# Patient Record
Sex: MALE | Race: WHITE | Employment: OTHER | URBAN - METROPOLITAN AREA
[De-identification: names, ages, dates, MRNs, and addresses within clinical notes are randomized per-mention and may not be internally consistent; named-entity substitution may affect disease eponyms.]

---

## 2024-10-08 ENCOUNTER — HOSPITAL ENCOUNTER (EMERGENCY)
Facility: HOSPITAL | Age: 79
Discharge: HOME | End: 2024-10-08
Attending: EMERGENCY MEDICINE

## 2024-10-08 VITALS
OXYGEN SATURATION: 91 % | HEIGHT: 69 IN | WEIGHT: 180 LBS | DIASTOLIC BLOOD PRESSURE: 92 MMHG | SYSTOLIC BLOOD PRESSURE: 166 MMHG | RESPIRATION RATE: 20 BRPM | BODY MASS INDEX: 26.66 KG/M2 | TEMPERATURE: 99 F | HEART RATE: 80 BPM

## 2024-10-08 DIAGNOSIS — R04.0 EPISTAXIS: Primary | ICD-10-CM

## 2024-10-08 PROCEDURE — 99282 EMERGENCY DEPT VISIT SF MDM: CPT

## 2024-10-08 NOTE — ED INITIAL ASSESSMENT (HPI)
Per patient, he has an intermittent left nose bleed since 1:30 AM. Patient is on Elaquis. Patient denies dizziness. Patient denies any trauma.

## 2024-10-09 NOTE — ED PROVIDER NOTES
Patient Seen in: [facility] Emergency Department      History     Chief Complaint   Patient presents with    Nose Bleed     Stated Complaint: nose bleed    Subjective:   HPI    Patient is a 79-year-old male who arrives with intermittent epistaxis from left nare since 1:30 in the morning.  He states it started spontaneously and resolved on its own.  He states it then occurred earlier this morning.  It has not bled since but he wanted to come in to have it checked out today.  He is on Eliquis.  He denies any other bruising or bleeding.  No dizziness.    Objective:     Past Medical History:    A-fib (HCC)    CVA (cerebral vascular accident) (HCC)    Diabetes (HCC)              History reviewed. No pertinent surgical history.             Social History     Socioeconomic History    Marital status:    Tobacco Use    Smoking status: Every Day    Smokeless tobacco: Never   Substance and Sexual Activity    Alcohol use: Yes     Alcohol/week: 6.0 - 7.0 standard drinks of alcohol     Types: 3 - 4 Standard drinks or equivalent, 3 Cans of beer per week     Social Determinants of Health     Food Insecurity: Low Risk  (3/11/2022)    Received from [facility]    Food Insecurity     Have there been times that your food ran out, and you didn't have money to get more?: No     Are there times that you worry that this might happen?: No   Transportation Needs: Low Risk  (3/11/2022)    Received from [facility]    Transportation Needs     Do you have trouble getting transportation to medical appointments?: No   Housing Stability: Low Risk  (3/11/2022)    Received from [facility]    Housing Stability     Are you concerned about having a safe and reliable place to live?: No                  Physical Exam     ED Triage Vitals [10/08/24 1853]   BP (S) (!) 191/101   Pulse 72   Resp 20   Temp 98.5 °F (36.9 °C)   Temp src    SpO2 95 %   O2 Device None (Room air)        Current Vitals:   Vital Signs  BP: (!) 166/91  Pulse: 72  Resp: 20  Temp: 98.5 °F (36.9 °C)    Oxygen Therapy  SpO2: 95 %  O2 Device: None (Room air)        Physical Exam  GENERAL: No acute distress, awake and alert  HEENT: EOMI, PERRL, dried blood medial left nare, no active bleeding  RESP: no tachypnea or retractions  Extremities: FROM of all extremities  Neuro: CN intact, normal speech, 5/5 motor strength in all extremities, no focal deficits    ED Course   Labs Reviewed - No data to display       MDM          Medical Decision Making  Patient declines any further evaluation as bleeding has now resolved.  Advised on management at home if bleeding continues and when to return.    Amount and/or Complexity of Data Reviewed  External Data Reviewed: labs.     Details: Labs 2022 reviewed        Disposition and Plan     Clinical Impression:  1. Epistaxis         Disposition:  Discharge  10/8/2024  8:10 pm    Follow-up:  Wyatt Ross DO  [address]  [address]  [facility] [id number] [phone]    Follow up      Ben Jackson MD  [address]  [facility] [address]  [phone]    Follow up      We recommend that you schedule follow up care with a primary care provider within the next three months to obtain basic health screening including reassessment of your blood pressure.      Medications Prescribed:  Current Discharge Medication List              Supplementary Documentation:

## 2024-10-09 NOTE — ED QUICK NOTES
Pt found in room 18, Pt states he does not have a ride home to the [employer] and does not have money for a taxi. Pt ambulatory with steady gait. This RN to call case management for an uber.

## 2024-10-09 NOTE — ED QUICK NOTES
Pt provided discharge paperwork and vital signs assessed prior to discharge. Pt verbalized understanding of all discharge paperwork with no further questions at this time.  Vital signs assessed prior to DC (See VS flowsheet for details). Pt wheeled via wheelchair to ED WR and into department provided Lyft.

## 2024-10-09 NOTE — DISCHARGE INSTRUCTIONS
\"You had elevated blood pressure today and you need to follow up with your doctor for a repeat blood pressure check and further discussion of lifestyle modifications that include Weight Reduction - Dietary Sodium Restriction - Increased Physical Activity and Moderation in alcohol (ETOH) Consumption. If possible check your pressure at home and keep a blood pressure log to bring to your physician.\"    Hold pressure if continues to bleed  Return for intractable bleeding

## 2024-10-10 ENCOUNTER — HOSPITAL ENCOUNTER (EMERGENCY)
Facility: HOSPITAL | Age: 79
Discharge: HOME | End: 2024-10-10
Attending: EMERGENCY MEDICINE

## 2024-10-10 VITALS
SYSTOLIC BLOOD PRESSURE: 157 MMHG | OXYGEN SATURATION: 97 % | TEMPERATURE: 99 F | RESPIRATION RATE: 18 BRPM | DIASTOLIC BLOOD PRESSURE: 63 MMHG | HEART RATE: 89 BPM

## 2024-10-10 DIAGNOSIS — J34.9: Primary | ICD-10-CM

## 2024-10-10 PROCEDURE — 99282 EMERGENCY DEPT VISIT SF MDM: CPT

## 2024-10-10 PROCEDURE — 99283 EMERGENCY DEPT VISIT LOW MDM: CPT

## 2024-10-11 NOTE — CM/SW NOTE
2011:  ELIZABETH Marrero RN called requesting this ERCM arrange lyft for patient for hospital discharge. Per ELIZABETH Marrero RN patient requests to be discharged to confirmed address on face sheet. This ERCM confirmed with ELIZABETH Marrero RN patient is able to ambulate safely independently. This ERCM also confirmed patient is ready to be discharged now. This ERCM informed ELIZABETH Marrero RN this ERCM will arrange lyft as soon as able and call him with lyft ETA and  details.    2100:  Lyft arranged - ETA 7MIN.    2112:  This ERCM called RULA Rayo in [facility] Triage and informed him of lyft ETA and  details. Per RULA Rayo patient not in [facility], however RULA Rayo states he will go to [facility] to get patient and ensure patient enters correct lyft upon it's arrival.    2115:  Per RULA Rayo he went into [facility] to get patient and patient \"is argumentative and does not want to leave.\" This ERCM informed RULA Rayo this ERCM will cancel pt's lyft.    2126:  Per ELIZABETH Rivera RN patient agreeable to be discharged now and has been escorted to [facility] to await his cab.     2128:  Lyft arranged - ETA 6MIN.    RULA Rayo informed of lyft ETA and  details and to please ensure patient enters correct lyft upon it's arrival. RULA Rayo v/u.

## 2024-10-11 NOTE — ED PROVIDER NOTES
Patient Seen in: [facility] Emergency Department    History     Chief Complaint   Patient presents with    Epistaxis       HPI    Patient presents to the ED concerned that he may get a nosebleed.  He states that he spit out a small amount of blood several days ago and is concerned that he may get another nosebleed.  He has had nosebleeds in the past.  Denies other complaints.    History reviewed.   Past Medical History:    A-fib (HCC)    CVA (cerebral vascular accident) (HCC)    Diabetes (HCC)       History reviewed. No past surgical history on file.      Medications :  Prescriptions Prior to Admission[1]     No family history on file.    Smoking Status:   Social History     Socioeconomic History    Marital status:    Tobacco Use    Smoking status: Every Day    Smokeless tobacco: Never   Substance and Sexual Activity    Alcohol use: Yes     Alcohol/week: 6.0 - 7.0 standard drinks of alcohol     Types: 3 - 4 Standard drinks or equivalent, 3 Cans of beer per week       Constitutional and vital signs reviewed.      Social History and Family History elements reviewed from today, pertinent positives to the presenting problem noted.    Physical Exam     ED Triage Vitals [10/10/24 1907]   /63   Pulse 89   Resp 18   Temp 99.2 °F (37.3 °C)   Temp src Oral   SpO2 97 %   O2 Device None (Room air)       All measures to prevent infection transmission during my interaction with the patient were taken. Handwashing was performed prior to and after the exam.  Stethoscope and any equipment used during my examination was cleaned with super sani-cloth germicidal wipes following the exam.     Physical Exam  Constitutional:       Appearance: Normal appearance.   HENT:      Head: Normocephalic and atraumatic.      Nose: Nose normal. No congestion or rhinorrhea.      Mouth/Throat:      Mouth: Mucous membranes are moist.      Pharynx: Oropharynx is clear.   Pulmonary:      Effort: Pulmonary effort is normal. No  respiratory distress.   Neurological:      Mental Status: He is alert. Mental status is at baseline.   Psychiatric:         Mood and Affect: Mood normal.         Behavior: Behavior normal.         ED Course      Labs Reviewed - No data to display    As Interpreted by me    Imaging Results Available and Reviewed while in ED: No results found.  ED Medications Administered: Medications - No data to display      MDM     Vitals:    10/10/24 1907   BP: 157/63   Pulse: 89   Resp: 18   Temp: 99.2 °F (37.3 °C)   TempSrc: Oral   SpO2: 97%     *I personally reviewed and interpreted all ED vitals.    Pulse Ox: 97%, Room air, Normal     Differential Diagnosis/ Diagnostic Considerations: Anxiety, prior    Complicating Factors: The patient already has does not have any pertinent problems on file. to contribute to the complexity of this ED evaluation.    Medical Decision Making  The patient presents to the ED due to concern that he may have another nosebleed.  No evidence for active bleeding at this time.  Patient advised on what to do if he should develop a nosebleed and stable for discharge at this time.    Problems Addressed:  Nose trouble: acute illness or injury        Condition upon leaving the department: Stable    Disposition and Plan     Clinical Impression:  1. Nose trouble        Disposition:  Discharge    Follow-up:  Wyatt Ross DO  [address]  [address] [place] [id number] [phone]    Schedule an appointment as soon as possible for a visit in 3 day(s)        Medications Prescribed:  Discharge Medication List as of 10/10/2024  8:01 PM                           [1] (Not in a hospital admission)

## 2024-10-11 NOTE — ED INITIAL ASSESSMENT (HPI)
Pt presents to the ED via EMS for an episode of \"spitting up blood.\" Pt seen in this ER on Tuesday for an episode of epistaxis.

## 2025-01-01 ENCOUNTER — APPOINTMENT (OUTPATIENT)
Dept: CV DIAGNOSTICS | Facility: HOSPITAL | Age: 80
DRG: 377 | End: 2025-01-01
Attending: INTERNAL MEDICINE
Payer: MEDICARE

## 2025-01-01 ENCOUNTER — APPOINTMENT (OUTPATIENT)
Dept: GENERAL RADIOLOGY | Facility: HOSPITAL | Age: 80
DRG: 377 | End: 2025-01-01
Attending: EMERGENCY MEDICINE
Payer: MEDICARE

## 2025-01-01 ENCOUNTER — APPOINTMENT (OUTPATIENT)
Dept: CT IMAGING | Facility: HOSPITAL | Age: 80
DRG: 377 | End: 2025-01-01
Attending: INTERNAL MEDICINE
Payer: MEDICARE

## 2025-01-01 ENCOUNTER — HOSPITAL ENCOUNTER (INPATIENT)
Facility: HOSPITAL | Age: 80
LOS: 14 days | Discharge: NURSING, REHAB OR LONG-TERM CARE | DRG: 377 | End: 2025-01-01
Attending: EMERGENCY MEDICINE | Admitting: INTERNAL MEDICINE
Payer: MEDICARE

## 2025-01-01 ENCOUNTER — APPOINTMENT (OUTPATIENT)
Dept: GENERAL RADIOLOGY | Facility: HOSPITAL | Age: 80
DRG: 377 | End: 2025-01-01
Attending: INTERNAL MEDICINE
Payer: MEDICARE

## 2025-01-01 ENCOUNTER — HOSPITAL ENCOUNTER (INPATIENT)
Facility: HOSPITAL | Age: 80
LOS: 1 days | End: 2025-01-01
Attending: EMERGENCY MEDICINE | Admitting: INTERNAL MEDICINE
Payer: MEDICARE

## 2025-01-01 ENCOUNTER — APPOINTMENT (OUTPATIENT)
Dept: GENERAL RADIOLOGY | Facility: HOSPITAL | Age: 80
End: 2025-01-01
Attending: EMERGENCY MEDICINE
Payer: MEDICARE

## 2025-01-01 ENCOUNTER — APPOINTMENT (OUTPATIENT)
Dept: GENERAL RADIOLOGY | Facility: HOSPITAL | Age: 80
DRG: 377 | End: 2025-01-01
Payer: MEDICARE

## 2025-01-01 ENCOUNTER — APPOINTMENT (OUTPATIENT)
Dept: CT IMAGING | Facility: HOSPITAL | Age: 80
DRG: 377 | End: 2025-01-01
Attending: EMERGENCY MEDICINE
Payer: MEDICARE

## 2025-01-01 VITALS
DIASTOLIC BLOOD PRESSURE: 55 MMHG | WEIGHT: 152.13 LBS | TEMPERATURE: 97 F | SYSTOLIC BLOOD PRESSURE: 76 MMHG | OXYGEN SATURATION: 95 % | BODY MASS INDEX: 22 KG/M2

## 2025-01-01 VITALS
HEIGHT: 69 IN | BODY MASS INDEX: 21.84 KG/M2 | DIASTOLIC BLOOD PRESSURE: 95 MMHG | HEART RATE: 76 BPM | OXYGEN SATURATION: 97 % | SYSTOLIC BLOOD PRESSURE: 117 MMHG | WEIGHT: 147.5 LBS | RESPIRATION RATE: 18 BRPM | TEMPERATURE: 98 F

## 2025-01-01 DIAGNOSIS — I21.3 ST ELEVATION MYOCARDIAL INFARCTION (STEMI), UNSPECIFIED ARTERY (HCC): Primary | ICD-10-CM

## 2025-01-01 DIAGNOSIS — K92.2 ACUTE GASTROINTESTINAL HEMORRHAGE: Primary | ICD-10-CM

## 2025-01-01 DIAGNOSIS — F41.9 ANXIETY: ICD-10-CM

## 2025-01-01 LAB
ALBUMIN SERPL-MCNC: 2.4 G/DL (ref 3.2–4.8)
ALBUMIN SERPL-MCNC: 2.9 G/DL (ref 3.2–4.8)
ALBUMIN SERPL-MCNC: 2.9 G/DL (ref 3.2–4.8)
ALBUMIN SERPL-MCNC: 3.2 G/DL (ref 3.2–4.8)
ALBUMIN SERPL-MCNC: 3.2 G/DL (ref 3.2–4.8)
ALBUMIN SERPL-MCNC: 3.3 G/DL (ref 3.2–4.8)
ALBUMIN SERPL-MCNC: 3.4 G/DL (ref 3.2–4.8)
ALBUMIN SERPL-MCNC: 3.5 G/DL (ref 3.2–4.8)
ALBUMIN SERPL-MCNC: 3.5 G/DL (ref 3.2–4.8)
ALBUMIN SERPL-MCNC: 3.7 G/DL (ref 3.2–4.8)
ALBUMIN/GLOB SERPL: 0.9 {RATIO} (ref 1–2)
ALBUMIN/GLOB SERPL: 0.9 {RATIO} (ref 1–2)
ALBUMIN/GLOB SERPL: 1 {RATIO} (ref 1–2)
ALBUMIN/GLOB SERPL: 1.1 {RATIO} (ref 1–2)
ALP LIVER SERPL-CCNC: 35 U/L (ref 45–117)
ALP LIVER SERPL-CCNC: 41 U/L (ref 45–117)
ALP LIVER SERPL-CCNC: 41 U/L (ref 45–117)
ALP LIVER SERPL-CCNC: 44 U/L (ref 45–117)
ALP LIVER SERPL-CCNC: 46 U/L (ref 45–117)
ALP LIVER SERPL-CCNC: 54 U/L (ref 45–117)
ALP LIVER SERPL-CCNC: 58 U/L (ref 45–117)
ALP LIVER SERPL-CCNC: 64 U/L (ref 45–117)
ALP LIVER SERPL-CCNC: 77 U/L (ref 45–117)
ALT SERPL-CCNC: 14 U/L (ref 10–49)
ALT SERPL-CCNC: 19 U/L (ref 10–49)
ALT SERPL-CCNC: 22 U/L (ref 10–49)
ALT SERPL-CCNC: 23 U/L (ref 10–49)
ALT SERPL-CCNC: 28 U/L (ref 10–49)
ALT SERPL-CCNC: 43 U/L (ref 10–49)
ALT SERPL-CCNC: 46 U/L (ref 10–49)
ALT SERPL-CCNC: 562 U/L (ref 10–49)
ALT SERPL-CCNC: 9 U/L (ref 10–49)
ANION GAP SERPL CALC-SCNC: 10 MMOL/L (ref 0–18)
ANION GAP SERPL CALC-SCNC: 11 MMOL/L (ref 0–18)
ANION GAP SERPL CALC-SCNC: 12 MMOL/L (ref 0–18)
ANION GAP SERPL CALC-SCNC: 15 MMOL/L (ref 0–18)
ANION GAP SERPL CALC-SCNC: 26 MMOL/L (ref 0–18)
ANION GAP SERPL CALC-SCNC: 7 MMOL/L (ref 0–18)
ANION GAP SERPL CALC-SCNC: 8 MMOL/L (ref 0–18)
ANION GAP SERPL CALC-SCNC: 9 MMOL/L (ref 0–18)
ANTIBODY SCREEN: NEGATIVE
APTT PPP: 40.2 SECONDS (ref 23–36)
APTT PPP: 40.9 SECONDS (ref 23–36)
APTT PPP: 44.5 SECONDS (ref 23–36)
APTT PPP: 44.9 SECONDS (ref 23–36)
APTT PPP: 46.5 SECONDS (ref 23–36)
APTT PPP: 48.8 SECONDS (ref 23–36)
APTT PPP: 51.3 SECONDS (ref 23–36)
APTT PPP: 56.6 SECONDS (ref 23–36)
APTT PPP: 56.6 SECONDS (ref 23–36)
APTT PPP: 59.3 SECONDS (ref 23–36)
APTT PPP: 74 SECONDS (ref 23–36)
AST SERPL-CCNC: 1170 U/L (ref ?–34)
AST SERPL-CCNC: 12 U/L (ref ?–34)
AST SERPL-CCNC: 15 U/L (ref ?–34)
AST SERPL-CCNC: 24 U/L (ref ?–34)
AST SERPL-CCNC: 27 U/L (ref ?–34)
AST SERPL-CCNC: 38 U/L (ref ?–34)
AST SERPL-CCNC: 39 U/L (ref ?–34)
AST SERPL-CCNC: 39 U/L (ref ?–34)
AST SERPL-CCNC: 42 U/L (ref ?–34)
ATRIAL RATE: 291 BPM
ATRIAL RATE: 84 BPM
BASE EXCESS BLD CALC-SCNC: -16 MMOL/L (ref ?–2)
BASE EXCESS BLD CALC-SCNC: -5.4 MMOL/L (ref ?–2)
BASE EXCESS BLD CALC-SCNC: 1.9 MMOL/L (ref ?–2)
BASOPHILS # BLD AUTO: 0 X10(3) UL (ref 0–0.2)
BASOPHILS # BLD AUTO: 0.01 X10(3) UL (ref 0–0.2)
BASOPHILS # BLD AUTO: 0.02 X10(3) UL (ref 0–0.2)
BASOPHILS # BLD AUTO: 0.03 X10(3) UL (ref 0–0.2)
BASOPHILS NFR BLD AUTO: 0 %
BASOPHILS NFR BLD AUTO: 0.1 %
BASOPHILS NFR BLD AUTO: 0.2 %
BASOPHILS NFR BLD AUTO: 0.6 %
BILIRUB SERPL-MCNC: 0.5 MG/DL (ref 0.2–1.1)
BILIRUB SERPL-MCNC: 0.5 MG/DL (ref 0.2–1.1)
BILIRUB SERPL-MCNC: 0.6 MG/DL (ref 0.2–1.1)
BILIRUB SERPL-MCNC: 0.7 MG/DL (ref 0.2–1.1)
BILIRUB SERPL-MCNC: 0.8 MG/DL (ref 0.2–1.1)
BILIRUB SERPL-MCNC: 0.8 MG/DL (ref 0.2–1.1)
BILIRUB SERPL-MCNC: 1.1 MG/DL (ref 0.2–1.1)
BILIRUB SERPL-MCNC: 1.2 MG/DL (ref 0.2–1.1)
BILIRUB SERPL-MCNC: 1.3 MG/DL (ref 0.2–1.1)
BILIRUB UR QL: NEGATIVE
BILIRUB UR QL: NEGATIVE
BLOOD TYPE BARCODE: 5100
BUN BLD-MCNC: 18 MG/DL (ref 9–23)
BUN BLD-MCNC: 25 MG/DL (ref 9–23)
BUN BLD-MCNC: 25 MG/DL (ref 9–23)
BUN BLD-MCNC: 26 MG/DL (ref 9–23)
BUN BLD-MCNC: 27 MG/DL (ref 9–23)
BUN BLD-MCNC: 28 MG/DL (ref 9–23)
BUN BLD-MCNC: 29 MG/DL (ref 9–23)
BUN BLD-MCNC: 31 MG/DL (ref 9–23)
BUN BLD-MCNC: 32 MG/DL (ref 9–23)
BUN BLD-MCNC: 32 MG/DL (ref 9–23)
BUN BLD-MCNC: 33 MG/DL (ref 9–23)
BUN BLD-MCNC: 34 MG/DL (ref 9–23)
BUN BLD-MCNC: 40 MG/DL (ref 9–23)
BUN BLD-MCNC: 43 MG/DL (ref 9–23)
BUN BLD-MCNC: 44 MG/DL (ref 9–23)
BUN BLD-MCNC: 45 MG/DL (ref 9–23)
BUN BLD-MCNC: 45 MG/DL (ref 9–23)
BUN BLD-MCNC: 47 MG/DL (ref 9–23)
BUN BLD-MCNC: 48 MG/DL (ref 9–23)
BUN/CREAT SERPL: 13.4 (ref 10–20)
BUN/CREAT SERPL: 13.7 (ref 10–20)
BUN/CREAT SERPL: 17.2 (ref 10–20)
BUN/CREAT SERPL: 17.2 (ref 10–20)
BUN/CREAT SERPL: 17.6 (ref 10–20)
BUN/CREAT SERPL: 17.8 (ref 10–20)
BUN/CREAT SERPL: 18.8 (ref 10–20)
BUN/CREAT SERPL: 19.4 (ref 10–20)
BUN/CREAT SERPL: 19.5 (ref 10–20)
BUN/CREAT SERPL: 20 (ref 10–20)
BUN/CREAT SERPL: 20.1 (ref 10–20)
BUN/CREAT SERPL: 20.1 (ref 10–20)
BUN/CREAT SERPL: 21.1 (ref 10–20)
BUN/CREAT SERPL: 21.4 (ref 10–20)
BUN/CREAT SERPL: 22.6 (ref 10–20)
BUN/CREAT SERPL: 23.9 (ref 10–20)
BUN/CREAT SERPL: 25 (ref 10–20)
BUN/CREAT SERPL: 25.6 (ref 10–20)
BUN/CREAT SERPL: 25.7 (ref 10–20)
BUN/CREAT SERPL: 26.5 (ref 10–20)
BUN/CREAT SERPL: 28.4 (ref 10–20)
CA-I BLD-SCNC: 1.15 MMOL/L (ref 0.95–1.32)
CALCIUM BLD-MCNC: 6.7 MG/DL (ref 8.7–10.4)
CALCIUM BLD-MCNC: 6.7 MG/DL (ref 8.7–10.4)
CALCIUM BLD-MCNC: 7.8 MG/DL (ref 8.7–10.4)
CALCIUM BLD-MCNC: 7.9 MG/DL (ref 8.7–10.4)
CALCIUM BLD-MCNC: 8 MG/DL (ref 8.7–10.4)
CALCIUM BLD-MCNC: 8.1 MG/DL (ref 8.7–10.4)
CALCIUM BLD-MCNC: 8.2 MG/DL (ref 8.7–10.4)
CALCIUM BLD-MCNC: 8.4 MG/DL (ref 8.7–10.4)
CALCIUM BLD-MCNC: 8.7 MG/DL (ref 8.7–10.4)
CALCIUM BLD-MCNC: 8.9 MG/DL (ref 8.7–10.4)
CHLORIDE SERPL-SCNC: 105 MMOL/L (ref 98–112)
CHLORIDE SERPL-SCNC: 108 MMOL/L (ref 98–112)
CHLORIDE SERPL-SCNC: 111 MMOL/L (ref 98–112)
CHLORIDE SERPL-SCNC: 113 MMOL/L (ref 98–112)
CHLORIDE SERPL-SCNC: 114 MMOL/L (ref 98–112)
CHLORIDE SERPL-SCNC: 116 MMOL/L (ref 98–112)
CHLORIDE SERPL-SCNC: 117 MMOL/L (ref 98–112)
CHLORIDE SERPL-SCNC: 117 MMOL/L (ref 98–112)
CHLORIDE SERPL-SCNC: 118 MMOL/L (ref 98–112)
CHLORIDE SERPL-SCNC: 119 MMOL/L (ref 98–112)
CHLORIDE SERPL-SCNC: 120 MMOL/L (ref 98–112)
CHOLEST SERPL-MCNC: 106 MG/DL (ref ?–200)
CO2 SERPL-SCNC: 10 MMOL/L (ref 21–32)
CO2 SERPL-SCNC: 14 MMOL/L (ref 21–32)
CO2 SERPL-SCNC: 15 MMOL/L (ref 21–32)
CO2 SERPL-SCNC: 15 MMOL/L (ref 21–32)
CO2 SERPL-SCNC: 16 MMOL/L (ref 21–32)
CO2 SERPL-SCNC: 19 MMOL/L (ref 21–32)
CO2 SERPL-SCNC: 20 MMOL/L (ref 21–32)
CO2 SERPL-SCNC: 20 MMOL/L (ref 21–32)
CO2 SERPL-SCNC: 21 MMOL/L (ref 21–32)
CO2 SERPL-SCNC: 22 MMOL/L (ref 21–32)
CO2 SERPL-SCNC: 23 MMOL/L (ref 21–32)
CO2 SERPL-SCNC: 25 MMOL/L (ref 21–32)
CO2 SERPL-SCNC: 25 MMOL/L (ref 21–32)
COHGB MFR BLD: 1.5 % (ref 0–3)
COLOR UR: YELLOW
COLOR UR: YELLOW
CREAT BLD-MCNC: 1.23 MG/DL (ref 0.7–1.3)
CREAT BLD-MCNC: 1.26 MG/DL (ref 0.7–1.3)
CREAT BLD-MCNC: 1.3 MG/DL (ref 0.7–1.3)
CREAT BLD-MCNC: 1.34 MG/DL (ref 0.7–1.3)
CREAT BLD-MCNC: 1.42 MG/DL (ref 0.7–1.3)
CREAT BLD-MCNC: 1.45 MG/DL (ref 0.7–1.3)
CREAT BLD-MCNC: 1.45 MG/DL (ref 0.7–1.3)
CREAT BLD-MCNC: 1.48 MG/DL (ref 0.7–1.3)
CREAT BLD-MCNC: 1.52 MG/DL (ref 0.7–1.3)
CREAT BLD-MCNC: 1.54 MG/DL (ref 0.7–1.3)
CREAT BLD-MCNC: 1.57 MG/DL (ref 0.7–1.3)
CREAT BLD-MCNC: 1.59 MG/DL (ref 0.7–1.3)
CREAT BLD-MCNC: 1.59 MG/DL (ref 0.7–1.3)
CREAT BLD-MCNC: 1.6 MG/DL (ref 0.7–1.3)
CREAT BLD-MCNC: 1.66 MG/DL (ref 0.7–1.3)
CREAT BLD-MCNC: 1.69 MG/DL (ref 0.7–1.3)
CREAT BLD-MCNC: 1.72 MG/DL (ref 0.7–1.3)
CREAT BLD-MCNC: 1.76 MG/DL (ref 0.7–1.3)
CREAT BLD-MCNC: 1.83 MG/DL (ref 0.7–1.3)
CREAT BLD-MCNC: 1.99 MG/DL (ref 0.7–1.3)
CREAT BLD-MCNC: 2.05 MG/DL (ref 0.7–1.3)
CREAT BLD-MCNC: 2.41 MG/DL (ref 0.7–1.3)
CREAT UR-SCNC: 87.2 MG/DL
DEPRECATED RDW RBC AUTO: 45.7 FL (ref 35.1–46.3)
DEPRECATED RDW RBC AUTO: 48.8 FL (ref 35.1–46.3)
DEPRECATED RDW RBC AUTO: 49.1 FL (ref 35.1–46.3)
DEPRECATED RDW RBC AUTO: 51.3 FL (ref 35.1–46.3)
DEPRECATED RDW RBC AUTO: 51.8 FL (ref 35.1–46.3)
DEPRECATED RDW RBC AUTO: 51.8 FL (ref 35.1–46.3)
DEPRECATED RDW RBC AUTO: 51.9 FL (ref 35.1–46.3)
DEPRECATED RDW RBC AUTO: 52 FL (ref 35.1–46.3)
DEPRECATED RDW RBC AUTO: 53.6 FL (ref 35.1–46.3)
DEPRECATED RDW RBC AUTO: 53.7 FL (ref 35.1–46.3)
DEPRECATED RDW RBC AUTO: 53.8 FL (ref 35.1–46.3)
DEPRECATED RDW RBC AUTO: 54.2 FL (ref 35.1–46.3)
DEPRECATED RDW RBC AUTO: 58.1 FL (ref 35.1–46.3)
EGFRCR SERPLBLD CKD-EPI 2021: 27 ML/MIN/1.73M2 (ref 60–?)
EGFRCR SERPLBLD CKD-EPI 2021: 33 ML/MIN/1.73M2 (ref 60–?)
EGFRCR SERPLBLD CKD-EPI 2021: 34 ML/MIN/1.73M2 (ref 60–?)
EGFRCR SERPLBLD CKD-EPI 2021: 38 ML/MIN/1.73M2 (ref 60–?)
EGFRCR SERPLBLD CKD-EPI 2021: 39 ML/MIN/1.73M2 (ref 60–?)
EGFRCR SERPLBLD CKD-EPI 2021: 40 ML/MIN/1.73M2 (ref 60–?)
EGFRCR SERPLBLD CKD-EPI 2021: 41 ML/MIN/1.73M2 (ref 60–?)
EGFRCR SERPLBLD CKD-EPI 2021: 42 ML/MIN/1.73M2 (ref 60–?)
EGFRCR SERPLBLD CKD-EPI 2021: 44 ML/MIN/1.73M2 (ref 60–?)
EGFRCR SERPLBLD CKD-EPI 2021: 45 ML/MIN/1.73M2 (ref 60–?)
EGFRCR SERPLBLD CKD-EPI 2021: 46 ML/MIN/1.73M2 (ref 60–?)
EGFRCR SERPLBLD CKD-EPI 2021: 47 ML/MIN/1.73M2 (ref 60–?)
EGFRCR SERPLBLD CKD-EPI 2021: 48 ML/MIN/1.73M2 (ref 60–?)
EGFRCR SERPLBLD CKD-EPI 2021: 50 ML/MIN/1.73M2 (ref 60–?)
EGFRCR SERPLBLD CKD-EPI 2021: 50 ML/MIN/1.73M2 (ref 60–?)
EGFRCR SERPLBLD CKD-EPI 2021: 51 ML/MIN/1.73M2 (ref 60–?)
EGFRCR SERPLBLD CKD-EPI 2021: 55 ML/MIN/1.73M2 (ref 60–?)
EGFRCR SERPLBLD CKD-EPI 2021: 57 ML/MIN/1.73M2 (ref 60–?)
EGFRCR SERPLBLD CKD-EPI 2021: 59 ML/MIN/1.73M2 (ref 60–?)
EGFRCR SERPLBLD CKD-EPI 2021: 60 ML/MIN/1.73M2 (ref 60–?)
EOSINOPHIL # BLD AUTO: 0 X10(3) UL (ref 0–0.7)
EOSINOPHIL # BLD AUTO: 0.06 X10(3) UL (ref 0–0.7)
EOSINOPHIL # BLD AUTO: 0.08 X10(3) UL (ref 0–0.7)
EOSINOPHIL NFR BLD AUTO: 0 %
EOSINOPHIL NFR BLD AUTO: 0.4 %
EOSINOPHIL NFR BLD AUTO: 1.7 %
ERYTHROCYTE [DISTWIDTH] IN BLOOD BY AUTOMATED COUNT: 16.1 % (ref 11–15)
ERYTHROCYTE [DISTWIDTH] IN BLOOD BY AUTOMATED COUNT: 16.2 % (ref 11–15)
ERYTHROCYTE [DISTWIDTH] IN BLOOD BY AUTOMATED COUNT: 16.8 % (ref 11–15)
ERYTHROCYTE [DISTWIDTH] IN BLOOD BY AUTOMATED COUNT: 16.8 % (ref 11–15)
ERYTHROCYTE [DISTWIDTH] IN BLOOD BY AUTOMATED COUNT: 16.9 % (ref 11–15)
ERYTHROCYTE [DISTWIDTH] IN BLOOD BY AUTOMATED COUNT: 17.2 % (ref 11–15)
ERYTHROCYTE [DISTWIDTH] IN BLOOD BY AUTOMATED COUNT: 17.2 % (ref 11–15)
ERYTHROCYTE [DISTWIDTH] IN BLOOD BY AUTOMATED COUNT: 17.4 % (ref 11–15)
ERYTHROCYTE [DISTWIDTH] IN BLOOD BY AUTOMATED COUNT: 17.7 % (ref 11–15)
ERYTHROCYTE [DISTWIDTH] IN BLOOD BY AUTOMATED COUNT: 17.8 % (ref 11–15)
ERYTHROCYTE [DISTWIDTH] IN BLOOD BY AUTOMATED COUNT: 18 % (ref 11–15)
EST. AVERAGE GLUCOSE BLD GHB EST-MCNC: 123 MG/DL (ref 68–126)
FLUAV + FLUBV RNA SPEC NAA+PROBE: NEGATIVE
FLUAV + FLUBV RNA SPEC NAA+PROBE: NEGATIVE
GLOBULIN PLAS-MCNC: 2.6 G/DL (ref 2–3.5)
GLOBULIN PLAS-MCNC: 2.9 G/DL (ref 2–3.5)
GLOBULIN PLAS-MCNC: 3 G/DL (ref 2–3.5)
GLOBULIN PLAS-MCNC: 3.2 G/DL (ref 2–3.5)
GLOBULIN PLAS-MCNC: 3.3 G/DL (ref 2–3.5)
GLOBULIN PLAS-MCNC: 3.6 G/DL (ref 2–3.5)
GLOBULIN PLAS-MCNC: 4 G/DL (ref 2–3.5)
GLUCOSE BLD-MCNC: 109 MG/DL (ref 70–99)
GLUCOSE BLD-MCNC: 109 MG/DL (ref 70–99)
GLUCOSE BLD-MCNC: 116 MG/DL (ref 70–99)
GLUCOSE BLD-MCNC: 124 MG/DL (ref 70–99)
GLUCOSE BLD-MCNC: 125 MG/DL (ref 70–99)
GLUCOSE BLD-MCNC: 128 MG/DL (ref 70–99)
GLUCOSE BLD-MCNC: 130 MG/DL (ref 70–99)
GLUCOSE BLD-MCNC: 132 MG/DL (ref 70–99)
GLUCOSE BLD-MCNC: 133 MG/DL (ref 70–99)
GLUCOSE BLD-MCNC: 145 MG/DL (ref 70–99)
GLUCOSE BLD-MCNC: 164 MG/DL (ref 70–99)
GLUCOSE BLD-MCNC: 168 MG/DL (ref 70–99)
GLUCOSE BLD-MCNC: 168 MG/DL (ref 70–99)
GLUCOSE BLD-MCNC: 173 MG/DL (ref 70–99)
GLUCOSE BLD-MCNC: 190 MG/DL (ref 70–99)
GLUCOSE BLD-MCNC: 199 MG/DL (ref 70–99)
GLUCOSE BLD-MCNC: 226 MG/DL (ref 70–99)
GLUCOSE BLD-MCNC: 229 MG/DL (ref 70–99)
GLUCOSE BLD-MCNC: 90 MG/DL (ref 70–99)
GLUCOSE BLDC GLUCOMTR-MCNC: 100 MG/DL (ref 70–99)
GLUCOSE BLDC GLUCOMTR-MCNC: 101 MG/DL (ref 70–99)
GLUCOSE BLDC GLUCOMTR-MCNC: 109 MG/DL (ref 70–99)
GLUCOSE BLDC GLUCOMTR-MCNC: 112 MG/DL (ref 70–99)
GLUCOSE BLDC GLUCOMTR-MCNC: 114 MG/DL (ref 70–99)
GLUCOSE BLDC GLUCOMTR-MCNC: 115 MG/DL (ref 70–99)
GLUCOSE BLDC GLUCOMTR-MCNC: 117 MG/DL (ref 70–99)
GLUCOSE BLDC GLUCOMTR-MCNC: 118 MG/DL (ref 70–99)
GLUCOSE BLDC GLUCOMTR-MCNC: 118 MG/DL (ref 70–99)
GLUCOSE BLDC GLUCOMTR-MCNC: 123 MG/DL (ref 70–99)
GLUCOSE BLDC GLUCOMTR-MCNC: 126 MG/DL (ref 70–99)
GLUCOSE BLDC GLUCOMTR-MCNC: 127 MG/DL (ref 70–99)
GLUCOSE BLDC GLUCOMTR-MCNC: 129 MG/DL (ref 70–99)
GLUCOSE BLDC GLUCOMTR-MCNC: 136 MG/DL (ref 70–99)
GLUCOSE BLDC GLUCOMTR-MCNC: 139 MG/DL (ref 70–99)
GLUCOSE BLDC GLUCOMTR-MCNC: 143 MG/DL (ref 70–99)
GLUCOSE BLDC GLUCOMTR-MCNC: 144 MG/DL (ref 70–99)
GLUCOSE BLDC GLUCOMTR-MCNC: 145 MG/DL (ref 70–99)
GLUCOSE BLDC GLUCOMTR-MCNC: 145 MG/DL (ref 70–99)
GLUCOSE BLDC GLUCOMTR-MCNC: 150 MG/DL (ref 70–99)
GLUCOSE BLDC GLUCOMTR-MCNC: 157 MG/DL (ref 70–99)
GLUCOSE BLDC GLUCOMTR-MCNC: 158 MG/DL (ref 70–99)
GLUCOSE BLDC GLUCOMTR-MCNC: 159 MG/DL (ref 70–99)
GLUCOSE BLDC GLUCOMTR-MCNC: 162 MG/DL (ref 70–99)
GLUCOSE BLDC GLUCOMTR-MCNC: 165 MG/DL (ref 70–99)
GLUCOSE BLDC GLUCOMTR-MCNC: 165 MG/DL (ref 70–99)
GLUCOSE BLDC GLUCOMTR-MCNC: 167 MG/DL (ref 70–99)
GLUCOSE BLDC GLUCOMTR-MCNC: 169 MG/DL (ref 70–99)
GLUCOSE BLDC GLUCOMTR-MCNC: 169 MG/DL (ref 70–99)
GLUCOSE BLDC GLUCOMTR-MCNC: 170 MG/DL (ref 70–99)
GLUCOSE BLDC GLUCOMTR-MCNC: 171 MG/DL (ref 70–99)
GLUCOSE BLDC GLUCOMTR-MCNC: 171 MG/DL (ref 70–99)
GLUCOSE BLDC GLUCOMTR-MCNC: 173 MG/DL (ref 70–99)
GLUCOSE BLDC GLUCOMTR-MCNC: 173 MG/DL (ref 70–99)
GLUCOSE BLDC GLUCOMTR-MCNC: 174 MG/DL (ref 70–99)
GLUCOSE BLDC GLUCOMTR-MCNC: 179 MG/DL (ref 70–99)
GLUCOSE BLDC GLUCOMTR-MCNC: 185 MG/DL (ref 70–99)
GLUCOSE BLDC GLUCOMTR-MCNC: 195 MG/DL (ref 70–99)
GLUCOSE BLDC GLUCOMTR-MCNC: 197 MG/DL (ref 70–99)
GLUCOSE BLDC GLUCOMTR-MCNC: 202 MG/DL (ref 70–99)
GLUCOSE BLDC GLUCOMTR-MCNC: 204 MG/DL (ref 70–99)
GLUCOSE BLDC GLUCOMTR-MCNC: 207 MG/DL (ref 70–99)
GLUCOSE BLDC GLUCOMTR-MCNC: 208 MG/DL (ref 70–99)
GLUCOSE BLDC GLUCOMTR-MCNC: 212 MG/DL (ref 70–99)
GLUCOSE BLDC GLUCOMTR-MCNC: 214 MG/DL (ref 70–99)
GLUCOSE BLDC GLUCOMTR-MCNC: 224 MG/DL (ref 70–99)
GLUCOSE BLDC GLUCOMTR-MCNC: 225 MG/DL (ref 70–99)
GLUCOSE BLDC GLUCOMTR-MCNC: 227 MG/DL (ref 70–99)
GLUCOSE BLDC GLUCOMTR-MCNC: 235 MG/DL (ref 70–99)
GLUCOSE BLDC GLUCOMTR-MCNC: 236 MG/DL (ref 70–99)
GLUCOSE BLDC GLUCOMTR-MCNC: 241 MG/DL (ref 70–99)
GLUCOSE BLDC GLUCOMTR-MCNC: 244 MG/DL (ref 70–99)
GLUCOSE BLDC GLUCOMTR-MCNC: 262 MG/DL (ref 70–99)
GLUCOSE BLDC GLUCOMTR-MCNC: 273 MG/DL (ref 70–99)
GLUCOSE BLDC GLUCOMTR-MCNC: 89 MG/DL (ref 70–99)
GLUCOSE BLDC GLUCOMTR-MCNC: 94 MG/DL (ref 70–99)
GLUCOSE BLDC GLUCOMTR-MCNC: 94 MG/DL (ref 70–99)
GLUCOSE BLDC GLUCOMTR-MCNC: 96 MG/DL (ref 70–99)
GLUCOSE BLDC GLUCOMTR-MCNC: 97 MG/DL (ref 70–99)
GLUCOSE BLDC GLUCOMTR-MCNC: 98 MG/DL (ref 70–99)
GLUCOSE UR-MCNC: NORMAL MG/DL
GLUCOSE UR-MCNC: NORMAL MG/DL
HBA1C MFR BLD: 5.9 % (ref ?–5.7)
HCO3 BLDA-SCNC: 12.4 MEQ/L (ref 21–27)
HCO3 BLDA-SCNC: 20.7 MEQ/L (ref 21–27)
HCO3 BLDA-SCNC: 26.3 MEQ/L (ref 21–27)
HCT VFR BLD AUTO: 22.4 % (ref 39–53)
HCT VFR BLD AUTO: 23.6 % (ref 39–53)
HCT VFR BLD AUTO: 23.6 % (ref 39–53)
HCT VFR BLD AUTO: 23.7 % (ref 39–53)
HCT VFR BLD AUTO: 23.8 % (ref 39–53)
HCT VFR BLD AUTO: 24.1 % (ref 39–53)
HCT VFR BLD AUTO: 24.5 % (ref 39–53)
HCT VFR BLD AUTO: 25.2 % (ref 39–53)
HCT VFR BLD AUTO: 25.6 % (ref 39–53)
HCT VFR BLD AUTO: 25.9 % (ref 39–53)
HCT VFR BLD AUTO: 26.4 % (ref 39–53)
HCT VFR BLD AUTO: 26.7 % (ref 39–53)
HCT VFR BLD AUTO: 26.8 % (ref 39–53)
HCT VFR BLD AUTO: 27.4 % (ref 39–53)
HCT VFR BLD AUTO: 27.8 % (ref 39–53)
HCT VFR BLD AUTO: 27.8 % (ref 39–53)
HCT VFR BLD AUTO: 27.9 % (ref 39–53)
HCT VFR BLD AUTO: 29.2 % (ref 39–53)
HCT VFR BLD AUTO: 31.7 % (ref 39–53)
HDLC SERPL-MCNC: 27 MG/DL (ref 40–59)
HGB BLD-MCNC: 10 G/DL (ref 13–17.5)
HGB BLD-MCNC: 6.4 G/DL (ref 13–17.5)
HGB BLD-MCNC: 7 G/DL (ref 13–17.5)
HGB BLD-MCNC: 7.2 G/DL (ref 13–17.5)
HGB BLD-MCNC: 7.2 G/DL (ref 13–17.5)
HGB BLD-MCNC: 7.3 G/DL (ref 13–17.5)
HGB BLD-MCNC: 7.4 G/DL (ref 13–17.5)
HGB BLD-MCNC: 7.5 G/DL (ref 13–17.5)
HGB BLD-MCNC: 7.5 G/DL (ref 13–17.5)
HGB BLD-MCNC: 7.6 G/DL (ref 13–17.5)
HGB BLD-MCNC: 7.6 G/DL (ref 13–17.5)
HGB BLD-MCNC: 7.7 G/DL (ref 13–17.5)
HGB BLD-MCNC: 8.2 G/DL (ref 13–17.5)
HGB BLD-MCNC: 8.4 G/DL (ref 13–17.5)
HGB BLD-MCNC: 8.5 G/DL (ref 13–17.5)
HGB BLD-MCNC: 8.6 G/DL (ref 13–17.5)
HGB BLD-MCNC: 8.9 G/DL (ref 13–17.5)
HGB BLD-MCNC: 9 G/DL (ref 13–17.5)
HGB BLD-MCNC: 9.1 G/DL (ref 13–17.5)
HGB BLD-MCNC: 9.4 G/DL (ref 13–17.5)
HGB BLD-MCNC: 9.5 G/DL (ref 13–17.5)
HGB BLD-MCNC: 9.8 G/DL (ref 13–17.5)
IMM GRANULOCYTES # BLD AUTO: 0.02 X10(3) UL (ref 0–1)
IMM GRANULOCYTES # BLD AUTO: 0.03 X10(3) UL (ref 0–1)
IMM GRANULOCYTES # BLD AUTO: 0.03 X10(3) UL (ref 0–1)
IMM GRANULOCYTES # BLD AUTO: 0.1 X10(3) UL (ref 0–1)
IMM GRANULOCYTES # BLD AUTO: 0.1 X10(3) UL (ref 0–1)
IMM GRANULOCYTES NFR BLD: 0.2 %
IMM GRANULOCYTES NFR BLD: 0.4 %
IMM GRANULOCYTES NFR BLD: 0.5 %
IMM GRANULOCYTES NFR BLD: 0.6 %
IMM GRANULOCYTES NFR BLD: 0.6 %
IMM GRANULOCYTES NFR BLD: 0.7 %
INR BLD: 1.47 (ref 0.8–1.2)
INR BLD: 2.17 (ref 0.8–1.2)
KETONES UR-MCNC: NEGATIVE MG/DL
KETONES UR-MCNC: NEGATIVE MG/DL
LACTATE BLD-SCNC: 1.2 MMOL/L (ref 0.5–2)
LACTATE BLD-SCNC: 7 MMOL/L (ref 0.5–2)
LACTATE SERPL-SCNC: 13.5 MMOL/L (ref 0.5–2)
LACTATE SERPL-SCNC: 18.9 MMOL/L (ref 0.5–2)
LDLC SERPL CALC-MCNC: 62 MG/DL (ref ?–100)
LEUKOCYTE ESTERASE UR QL STRIP.AUTO: 25
LEUKOCYTE ESTERASE UR QL STRIP.AUTO: 25
LYMPHOCYTES # BLD AUTO: 0.68 X10(3) UL (ref 1–4)
LYMPHOCYTES # BLD AUTO: 0.73 X10(3) UL (ref 1–4)
LYMPHOCYTES # BLD AUTO: 0.74 X10(3) UL (ref 1–4)
LYMPHOCYTES # BLD AUTO: 0.89 X10(3) UL (ref 1–4)
LYMPHOCYTES # BLD AUTO: 0.9 X10(3) UL (ref 1–4)
LYMPHOCYTES # BLD AUTO: 1.06 X10(3) UL (ref 1–4)
LYMPHOCYTES # BLD AUTO: 1.33 X10(3) UL (ref 1–4)
LYMPHOCYTES # BLD AUTO: 1.99 X10(3) UL (ref 1–4)
LYMPHOCYTES NFR BLD AUTO: 10.8 %
LYMPHOCYTES NFR BLD AUTO: 10.9 %
LYMPHOCYTES NFR BLD AUTO: 15.3 %
LYMPHOCYTES NFR BLD AUTO: 18.2 %
LYMPHOCYTES NFR BLD AUTO: 22.9 %
LYMPHOCYTES NFR BLD AUTO: 41.6 %
LYMPHOCYTES NFR BLD AUTO: 5.5 %
LYMPHOCYTES NFR BLD AUTO: 8.6 %
MAGNESIUM SERPL-MCNC: 1.8 MG/DL (ref 1.6–2.6)
MAGNESIUM SERPL-MCNC: 1.8 MG/DL (ref 1.6–2.6)
MAGNESIUM SERPL-MCNC: 1.9 MG/DL (ref 1.6–2.6)
MAGNESIUM SERPL-MCNC: 2 MG/DL (ref 1.6–2.6)
MAGNESIUM SERPL-MCNC: 2.1 MG/DL (ref 1.6–2.6)
MCH RBC QN AUTO: 24 PG (ref 26–34)
MCH RBC QN AUTO: 24.7 PG (ref 26–34)
MCH RBC QN AUTO: 24.9 PG (ref 26–34)
MCH RBC QN AUTO: 24.9 PG (ref 26–34)
MCH RBC QN AUTO: 25 PG (ref 26–34)
MCH RBC QN AUTO: 25.4 PG (ref 26–34)
MCH RBC QN AUTO: 25.7 PG (ref 26–34)
MCH RBC QN AUTO: 25.8 PG (ref 26–34)
MCH RBC QN AUTO: 26 PG (ref 26–34)
MCH RBC QN AUTO: 26 PG (ref 26–34)
MCH RBC QN AUTO: 26.1 PG (ref 26–34)
MCH RBC QN AUTO: 26.4 PG (ref 26–34)
MCH RBC QN AUTO: 26.5 PG (ref 26–34)
MCHC RBC AUTO-ENTMCNC: 28.1 G/DL (ref 31–37)
MCHC RBC AUTO-ENTMCNC: 29.3 G/DL (ref 31–37)
MCHC RBC AUTO-ENTMCNC: 30.1 G/DL (ref 31–37)
MCHC RBC AUTO-ENTMCNC: 30.3 G/DL (ref 31–37)
MCHC RBC AUTO-ENTMCNC: 30.5 G/DL (ref 31–37)
MCHC RBC AUTO-ENTMCNC: 30.6 G/DL (ref 31–37)
MCHC RBC AUTO-ENTMCNC: 30.7 G/DL (ref 31–37)
MCHC RBC AUTO-ENTMCNC: 30.9 G/DL (ref 31–37)
MCHC RBC AUTO-ENTMCNC: 31.3 G/DL (ref 31–37)
MCHC RBC AUTO-ENTMCNC: 31.6 G/DL (ref 31–37)
MCHC RBC AUTO-ENTMCNC: 32.1 G/DL (ref 31–37)
MCHC RBC AUTO-ENTMCNC: 32.5 G/DL (ref 31–37)
MCHC RBC AUTO-ENTMCNC: 32.6 G/DL (ref 31–37)
MCV RBC AUTO: 77.6 FL (ref 80–100)
MCV RBC AUTO: 79.8 FL (ref 80–100)
MCV RBC AUTO: 81.5 FL (ref 80–100)
MCV RBC AUTO: 81.8 FL (ref 80–100)
MCV RBC AUTO: 82 FL (ref 80–100)
MCV RBC AUTO: 82.2 FL (ref 80–100)
MCV RBC AUTO: 83.1 FL (ref 80–100)
MCV RBC AUTO: 83.5 FL (ref 80–100)
MCV RBC AUTO: 83.6 FL (ref 80–100)
MCV RBC AUTO: 83.7 FL (ref 80–100)
MCV RBC AUTO: 84.2 FL (ref 80–100)
MCV RBC AUTO: 84.9 FL (ref 80–100)
MCV RBC AUTO: 88.8 FL (ref 80–100)
METHGB MFR BLD: 1.2 % SAT (ref 0.4–1.5)
MODIFIED ALLEN TEST: POSITIVE
MODIFIED ALLEN TEST: POSITIVE
MONOCYTES # BLD AUTO: 0.15 X10(3) UL (ref 0.1–1)
MONOCYTES # BLD AUTO: 0.19 X10(3) UL (ref 0.1–1)
MONOCYTES # BLD AUTO: 0.35 X10(3) UL (ref 0.1–1)
MONOCYTES # BLD AUTO: 0.47 X10(3) UL (ref 0.1–1)
MONOCYTES # BLD AUTO: 0.58 X10(3) UL (ref 0.1–1)
MONOCYTES # BLD AUTO: 0.63 X10(3) UL (ref 0.1–1)
MONOCYTES # BLD AUTO: 0.66 X10(3) UL (ref 0.1–1)
MONOCYTES # BLD AUTO: 0.78 X10(3) UL (ref 0.1–1)
MONOCYTES NFR BLD AUTO: 11.6 %
MONOCYTES NFR BLD AUTO: 11.9 %
MONOCYTES NFR BLD AUTO: 3.4 %
MONOCYTES NFR BLD AUTO: 4.1 %
MONOCYTES NFR BLD AUTO: 4.1 %
MONOCYTES NFR BLD AUTO: 4.9 %
MONOCYTES NFR BLD AUTO: 5.7 %
MONOCYTES NFR BLD AUTO: 7.3 %
MRSA DNA SPEC QL NAA+PROBE: NEGATIVE
NEUTROPHILS # BLD AUTO: 11.83 X10 (3) UL (ref 1.5–7.7)
NEUTROPHILS # BLD AUTO: 11.83 X10(3) UL (ref 1.5–7.7)
NEUTROPHILS # BLD AUTO: 13.37 X10 (3) UL (ref 1.5–7.7)
NEUTROPHILS # BLD AUTO: 13.37 X10(3) UL (ref 1.5–7.7)
NEUTROPHILS # BLD AUTO: 2.31 X10 (3) UL (ref 1.5–7.7)
NEUTROPHILS # BLD AUTO: 2.31 X10(3) UL (ref 1.5–7.7)
NEUTROPHILS # BLD AUTO: 3.36 X10 (3) UL (ref 1.5–7.7)
NEUTROPHILS # BLD AUTO: 3.36 X10(3) UL (ref 1.5–7.7)
NEUTROPHILS # BLD AUTO: 3.38 X10 (3) UL (ref 1.5–7.7)
NEUTROPHILS # BLD AUTO: 3.38 X10(3) UL (ref 1.5–7.7)
NEUTROPHILS # BLD AUTO: 3.58 X10 (3) UL (ref 1.5–7.7)
NEUTROPHILS # BLD AUTO: 3.58 X10(3) UL (ref 1.5–7.7)
NEUTROPHILS # BLD AUTO: 5.2 X10 (3) UL (ref 1.5–7.7)
NEUTROPHILS # BLD AUTO: 5.2 X10(3) UL (ref 1.5–7.7)
NEUTROPHILS # BLD AUTO: 6.87 X10 (3) UL (ref 1.5–7.7)
NEUTROPHILS # BLD AUTO: 6.87 X10(3) UL (ref 1.5–7.7)
NEUTROPHILS NFR BLD AUTO: 48.4 %
NEUTROPHILS NFR BLD AUTO: 69.3 %
NEUTROPHILS NFR BLD AUTO: 72.6 %
NEUTROPHILS NFR BLD AUTO: 77.1 %
NEUTROPHILS NFR BLD AUTO: 80.8 %
NEUTROPHILS NFR BLD AUTO: 83 %
NEUTROPHILS NFR BLD AUTO: 86.2 %
NEUTROPHILS NFR BLD AUTO: 88.8 %
NITRITE UR QL STRIP.AUTO: NEGATIVE
NITRITE UR QL STRIP.AUTO: NEGATIVE
NONHDLC SERPL-MCNC: 79 MG/DL (ref ?–130)
O2 CT BLD-SCNC: 10.8 VOL% (ref 15–23)
O2 CT BLD-SCNC: 14.3 VOL% (ref 15–23)
O2 CT BLD-SCNC: 15.2 VOL% (ref 15–23)
O2/TOTAL GAS SETTING VFR VENT: 100 %
O2/TOTAL GAS SETTING VFR VENT: 60 %
OSMOLALITY SERPL CALC.SUM OF ELEC: 298 MOSM/KG (ref 275–295)
OSMOLALITY SERPL CALC.SUM OF ELEC: 301 MOSM/KG (ref 275–295)
OSMOLALITY SERPL CALC.SUM OF ELEC: 304 MOSM/KG (ref 275–295)
OSMOLALITY SERPL CALC.SUM OF ELEC: 305 MOSM/KG (ref 275–295)
OSMOLALITY SERPL CALC.SUM OF ELEC: 309 MOSM/KG (ref 275–295)
OSMOLALITY SERPL CALC.SUM OF ELEC: 309 MOSM/KG (ref 275–295)
OSMOLALITY SERPL CALC.SUM OF ELEC: 310 MOSM/KG (ref 275–295)
OSMOLALITY SERPL CALC.SUM OF ELEC: 310 MOSM/KG (ref 275–295)
OSMOLALITY SERPL CALC.SUM OF ELEC: 313 MOSM/KG (ref 275–295)
OSMOLALITY SERPL CALC.SUM OF ELEC: 314 MOSM/KG (ref 275–295)
OSMOLALITY SERPL CALC.SUM OF ELEC: 314 MOSM/KG (ref 275–295)
OSMOLALITY SERPL CALC.SUM OF ELEC: 315 MOSM/KG (ref 275–295)
OSMOLALITY SERPL CALC.SUM OF ELEC: 319 MOSM/KG (ref 275–295)
OSMOLALITY SERPL CALC.SUM OF ELEC: 322 MOSM/KG (ref 275–295)
OSMOLALITY SERPL CALC.SUM OF ELEC: 322 MOSM/KG (ref 275–295)
OSMOLALITY SERPL CALC.SUM OF ELEC: 324 MOSM/KG (ref 275–295)
OSMOLALITY SERPL CALC.SUM OF ELEC: 324 MOSM/KG (ref 275–295)
OSMOLALITY SERPL CALC.SUM OF ELEC: 325 MOSM/KG (ref 275–295)
OXYGEN LITERS/MINUTE: 3 L/MIN
P AXIS: 83 DEGREES
P AXIS: 84 DEGREES
P-R INTERVAL: 194 MS
PCO2 BLDA: 25 MM HG (ref 35–45)
PCO2 BLDA: 32 MM HG (ref 35–45)
PCO2 BLDA: 35 MM HG (ref 35–45)
PEEP SETTING VENT: 5 CM H2O
PEEP SETTING VENT: 5 CM H2O
PH BLDA: 7.14 [PH] (ref 7.35–7.45)
PH BLDA: 7.45 [PH] (ref 7.35–7.45)
PH BLDA: 7.5 [PH] (ref 7.35–7.45)
PH UR: 5 [PH] (ref 5–8)
PH UR: 5 [PH] (ref 5–8)
PHOSPHATE SERPL-MCNC: 2.8 MG/DL (ref 2.4–5.1)
PHOSPHATE SERPL-MCNC: 2.8 MG/DL (ref 2.4–5.1)
PLATELET # BLD AUTO: 257 10(3)UL (ref 150–450)
PLATELET # BLD AUTO: 270 10(3)UL (ref 150–450)
PLATELET # BLD AUTO: 280 10(3)UL (ref 150–450)
PLATELET # BLD AUTO: 286 10(3)UL (ref 150–450)
PLATELET # BLD AUTO: 287 10(3)UL (ref 150–450)
PLATELET # BLD AUTO: 300 10(3)UL (ref 150–450)
PLATELET # BLD AUTO: 344 10(3)UL (ref 150–450)
PLATELET # BLD AUTO: 393 10(3)UL (ref 150–450)
PLATELET # BLD AUTO: 463 10(3)UL (ref 150–450)
PLATELET # BLD AUTO: 516 10(3)UL (ref 150–450)
PLATELET # BLD AUTO: 516 10(3)UL (ref 150–450)
PLATELET # BLD AUTO: 520 10(3)UL (ref 150–450)
PLATELET # BLD AUTO: 539 10(3)UL (ref 150–450)
PLATELET # BLD AUTO: 650 10(3)UL (ref 150–450)
PO2 BLDA: 267 MM HG (ref 80–100)
PO2 BLDA: 400 MM HG (ref 80–100)
PO2 BLDA: 56 MM HG (ref 80–100)
POTASSIUM BLD-SCNC: 4.7 MMOL/L (ref 3.6–5.1)
POTASSIUM SERPL-SCNC: 3.1 MMOL/L (ref 3.5–5.1)
POTASSIUM SERPL-SCNC: 3.1 MMOL/L (ref 3.5–5.1)
POTASSIUM SERPL-SCNC: 3.2 MMOL/L (ref 3.5–5.1)
POTASSIUM SERPL-SCNC: 3.3 MMOL/L (ref 3.5–5.1)
POTASSIUM SERPL-SCNC: 3.4 MMOL/L (ref 3.5–5.1)
POTASSIUM SERPL-SCNC: 3.4 MMOL/L (ref 3.5–5.1)
POTASSIUM SERPL-SCNC: 3.5 MMOL/L (ref 3.5–5.1)
POTASSIUM SERPL-SCNC: 3.6 MMOL/L (ref 3.5–5.1)
POTASSIUM SERPL-SCNC: 3.7 MMOL/L (ref 3.5–5.1)
POTASSIUM SERPL-SCNC: 4.2 MMOL/L (ref 3.5–5.1)
POTASSIUM SERPL-SCNC: 4.3 MMOL/L (ref 3.5–5.1)
POTASSIUM SERPL-SCNC: 4.6 MMOL/L (ref 3.5–5.1)
POTASSIUM SERPL-SCNC: 4.7 MMOL/L (ref 3.5–5.1)
POTASSIUM SERPL-SCNC: 4.9 MMOL/L (ref 3.5–5.1)
POTASSIUM SERPL-SCNC: 5.1 MMOL/L (ref 3.5–5.1)
POTASSIUM SERPL-SCNC: 5.8 MMOL/L (ref 3.5–5.1)
PROT SERPL-MCNC: 5 G/DL (ref 5.7–8.2)
PROT SERPL-MCNC: 5.8 G/DL (ref 5.7–8.2)
PROT SERPL-MCNC: 6.2 G/DL (ref 5.7–8.2)
PROT SERPL-MCNC: 6.2 G/DL (ref 5.7–8.2)
PROT SERPL-MCNC: 6.4 G/DL (ref 5.7–8.2)
PROT SERPL-MCNC: 6.5 G/DL (ref 5.7–8.2)
PROT SERPL-MCNC: 6.8 G/DL (ref 5.7–8.2)
PROT SERPL-MCNC: 7.3 G/DL (ref 5.7–8.2)
PROT SERPL-MCNC: 7.5 G/DL (ref 5.7–8.2)
PROT UR-MCNC: 30 MG/DL
PROT UR-MCNC: 30 MG/DL
PROTHROMBIN TIME: 18.6 SECONDS (ref 11.6–14.8)
PROTHROMBIN TIME: 25.3 SECONDS (ref 11.6–14.8)
PUNCTURE CHARGE: YES
Q-T INTERVAL: 370 MS
Q-T INTERVAL: 438 MS
QRS DURATION: 94 MS
QRS DURATION: 96 MS
QTC CALCULATION (BEZET): 469 MS
QTC CALCULATION (BEZET): 517 MS
R AXIS: 23 DEGREES
R AXIS: 50 DEGREES
RBC # BLD AUTO: 2.68 X10(6)UL (ref 3.8–5.8)
RBC # BLD AUTO: 2.89 X10(6)UL (ref 3.8–5.8)
RBC # BLD AUTO: 2.91 X10(6)UL (ref 3.8–5.8)
RBC # BLD AUTO: 3.04 X10(6)UL (ref 3.8–5.8)
RBC # BLD AUTO: 3.05 X10(6)UL (ref 3.8–5.8)
RBC # BLD AUTO: 3.08 X10(6)UL (ref 3.8–5.8)
RBC # BLD AUTO: 3.19 X10(6)UL (ref 3.8–5.8)
RBC # BLD AUTO: 3.24 X10(6)UL (ref 3.8–5.8)
RBC # BLD AUTO: 3.26 X10(6)UL (ref 3.8–5.8)
RBC # BLD AUTO: 3.3 X10(6)UL (ref 3.8–5.8)
RBC # BLD AUTO: 3.34 X10(6)UL (ref 3.8–5.8)
RBC # BLD AUTO: 3.57 X10(6)UL (ref 3.8–5.8)
RBC # BLD AUTO: 3.66 X10(6)UL (ref 3.8–5.8)
RBC #/AREA URNS AUTO: >10 /HPF
RBC #/AREA URNS AUTO: >10 /HPF
RESP RATE: 14 BPM
RESP RATE: 14 BPM
RH BLOOD TYPE: POSITIVE
RH BLOOD TYPE: POSITIVE
RSV RNA SPEC NAA+PROBE: NEGATIVE
SAO2 % BLDA: 92 % (ref 94–100)
SAO2 % BLDA: 99 % (ref 94–100)
SAO2 % BLDA: >99 % (ref 94–100)
SARS-COV-2 RNA RESP QL NAA+PROBE: NOT DETECTED
SODIUM BLD-SCNC: 138 MMOL/L (ref 135–145)
SODIUM SERPL-SCNC: 140 MMOL/L (ref 136–145)
SODIUM SERPL-SCNC: 141 MMOL/L (ref 136–145)
SODIUM SERPL-SCNC: 141 MMOL/L (ref 136–145)
SODIUM SERPL-SCNC: 142 MMOL/L (ref 136–145)
SODIUM SERPL-SCNC: 142 MMOL/L (ref 136–145)
SODIUM SERPL-SCNC: 143 MMOL/L (ref 136–145)
SODIUM SERPL-SCNC: 144 MMOL/L (ref 136–145)
SODIUM SERPL-SCNC: 145 MMOL/L (ref 136–145)
SODIUM SERPL-SCNC: 145 MMOL/L (ref 136–145)
SODIUM SERPL-SCNC: 147 MMOL/L (ref 136–145)
SODIUM SERPL-SCNC: 148 MMOL/L (ref 136–145)
SODIUM SERPL-SCNC: 149 MMOL/L (ref 136–145)
SODIUM SERPL-SCNC: 149 MMOL/L (ref 136–145)
SODIUM SERPL-SCNC: 152 MMOL/L (ref 136–145)
SODIUM SERPL-SCNC: 152 MMOL/L (ref 136–145)
SODIUM SERPL-SCNC: 58 MMOL/L
SP GR UR STRIP: 1.02 (ref 1–1.03)
SP GR UR STRIP: 1.02 (ref 1–1.03)
SPECIMEN VOL 24H UR: 500 ML
SPECIMEN VOL 24H UR: 500 ML
T AXIS: 141 DEGREES
T AXIS: 69 DEGREES
T4 FREE SERPL-MCNC: 0.8 NG/DL (ref 0.8–1.7)
TRIGL SERPL-MCNC: 112 MG/DL (ref 30–149)
TRIGL SERPL-MCNC: 83 MG/DL (ref 30–149)
TSI SER-ACNC: 1.12 UIU/ML (ref 0.55–4.78)
UNIT VOLUME: 350 ML
UROBILINOGEN UR STRIP-ACNC: NORMAL
UROBILINOGEN UR STRIP-ACNC: NORMAL
VENTRICULAR RATE: 84 BPM
VENTRICULAR RATE: 97 BPM
VIT B12 SERPL-MCNC: 477 PG/ML (ref 211–911)
VLDLC SERPL CALC-MCNC: 12 MG/DL (ref 0–30)
WBC # BLD AUTO: 11.3 X10(3) UL (ref 4–11)
WBC # BLD AUTO: 11.9 X10(3) UL (ref 4–11)
WBC # BLD AUTO: 13.4 X10(3) UL (ref 4–11)
WBC # BLD AUTO: 13.6 X10(3) UL (ref 4–11)
WBC # BLD AUTO: 13.7 X10(3) UL (ref 4–11)
WBC # BLD AUTO: 13.8 X10(3) UL (ref 4–11)
WBC # BLD AUTO: 15.5 X10(3) UL (ref 4–11)
WBC # BLD AUTO: 4.4 X10(3) UL (ref 4–11)
WBC # BLD AUTO: 4.6 X10(3) UL (ref 4–11)
WBC # BLD AUTO: 4.8 X10(3) UL (ref 4–11)
WBC # BLD AUTO: 4.9 X10(3) UL (ref 4–11)
WBC # BLD AUTO: 6.8 X10(3) UL (ref 4–11)
WBC # BLD AUTO: 8.3 X10(3) UL (ref 4–11)

## 2025-01-01 PROCEDURE — 93010 ELECTROCARDIOGRAM REPORT: CPT | Performed by: INTERNAL MEDICINE

## 2025-01-01 PROCEDURE — 94003 VENT MGMT INPAT SUBQ DAY: CPT

## 2025-01-01 PROCEDURE — 99222 1ST HOSP IP/OBS MODERATE 55: CPT | Performed by: OTHER

## 2025-01-01 PROCEDURE — 87150 DNA/RNA AMPLIFIED PROBE: CPT | Performed by: EMERGENCY MEDICINE

## 2025-01-01 PROCEDURE — 83605 ASSAY OF LACTIC ACID: CPT | Performed by: EMERGENCY MEDICINE

## 2025-01-01 PROCEDURE — 96365 THER/PROPH/DIAG IV INF INIT: CPT

## 2025-01-01 PROCEDURE — 74174 CTA ABD&PLVS W/CONTRAST: CPT | Performed by: EMERGENCY MEDICINE

## 2025-01-01 PROCEDURE — 36410 VNPNXR 3YR/> PHY/QHP DX/THER: CPT | Performed by: NURSE PRACTITIONER

## 2025-01-01 PROCEDURE — 5A12012 PERFORMANCE OF CARDIAC OUTPUT, SINGLE, MANUAL: ICD-10-PCS | Performed by: INTERNAL MEDICINE

## 2025-01-01 PROCEDURE — 71045 X-RAY EXAM CHEST 1 VIEW: CPT | Performed by: INTERNAL MEDICINE

## 2025-01-01 PROCEDURE — 36556 INSERT NON-TUNNEL CV CATH: CPT

## 2025-01-01 PROCEDURE — 30233H1 TRANSFUSION OF NONAUTOLOGOUS WHOLE BLOOD INTO PERIPHERAL VEIN, PERCUTANEOUS APPROACH: ICD-10-PCS | Performed by: INTERNAL MEDICINE

## 2025-01-01 PROCEDURE — 90792 PSYCH DIAG EVAL W/MED SRVCS: CPT | Performed by: OTHER

## 2025-01-01 PROCEDURE — 0DBN8ZZ EXCISION OF SIGMOID COLON, VIA NATURAL OR ARTIFICIAL OPENING ENDOSCOPIC: ICD-10-PCS | Performed by: INTERNAL MEDICINE

## 2025-01-01 PROCEDURE — 71045 X-RAY EXAM CHEST 1 VIEW: CPT

## 2025-01-01 PROCEDURE — 87106 FUNGI IDENTIFICATION YEAST: CPT | Performed by: EMERGENCY MEDICINE

## 2025-01-01 PROCEDURE — 93010 ELECTROCARDIOGRAM REPORT: CPT

## 2025-01-01 PROCEDURE — 85025 COMPLETE CBC W/AUTO DIFF WBC: CPT | Performed by: EMERGENCY MEDICINE

## 2025-01-01 PROCEDURE — 99233 SBSQ HOSP IP/OBS HIGH 50: CPT | Performed by: INTERNAL MEDICINE

## 2025-01-01 PROCEDURE — 84484 ASSAY OF TROPONIN QUANT: CPT | Performed by: EMERGENCY MEDICINE

## 2025-01-01 PROCEDURE — 99232 SBSQ HOSP IP/OBS MODERATE 35: CPT | Performed by: NURSE PRACTITIONER

## 2025-01-01 PROCEDURE — 02HV33Z INSERTION OF INFUSION DEVICE INTO SUPERIOR VENA CAVA, PERCUTANEOUS APPROACH: ICD-10-PCS | Performed by: INTERNAL MEDICINE

## 2025-01-01 PROCEDURE — 71045 X-RAY EXAM CHEST 1 VIEW: CPT | Performed by: EMERGENCY MEDICINE

## 2025-01-01 PROCEDURE — 94002 VENT MGMT INPAT INIT DAY: CPT

## 2025-01-01 PROCEDURE — 0BH17EZ INSERTION OF ENDOTRACHEAL AIRWAY INTO TRACHEA, VIA NATURAL OR ARTIFICIAL OPENING: ICD-10-PCS | Performed by: INTERNAL MEDICINE

## 2025-01-01 PROCEDURE — 02HV33Z INSERTION OF INFUSION DEVICE INTO SUPERIOR VENA CAVA, PERCUTANEOUS APPROACH: ICD-10-PCS | Performed by: EMERGENCY MEDICINE

## 2025-01-01 PROCEDURE — 99233 SBSQ HOSP IP/OBS HIGH 50: CPT | Performed by: OTHER

## 2025-01-01 PROCEDURE — 70450 CT HEAD/BRAIN W/O DYE: CPT | Performed by: INTERNAL MEDICINE

## 2025-01-01 PROCEDURE — 80061 LIPID PANEL: CPT | Performed by: EMERGENCY MEDICINE

## 2025-01-01 PROCEDURE — 80053 COMPREHEN METABOLIC PANEL: CPT | Performed by: EMERGENCY MEDICINE

## 2025-01-01 PROCEDURE — 99291 CRITICAL CARE FIRST HOUR: CPT

## 2025-01-01 PROCEDURE — 0DJ08ZZ INSPECTION OF UPPER INTESTINAL TRACT, VIA NATURAL OR ARTIFICIAL OPENING ENDOSCOPIC: ICD-10-PCS | Performed by: INTERNAL MEDICINE

## 2025-01-01 PROCEDURE — 93005 ELECTROCARDIOGRAM TRACING: CPT

## 2025-01-01 PROCEDURE — 99291 CRITICAL CARE FIRST HOUR: CPT | Performed by: EMERGENCY MEDICINE

## 2025-01-01 PROCEDURE — 96366 THER/PROPH/DIAG IV INF ADDON: CPT

## 2025-01-01 PROCEDURE — 0241U SARS-COV-2/FLU A AND B/RSV BY PCR (GENEXPERT): CPT | Performed by: EMERGENCY MEDICINE

## 2025-01-01 PROCEDURE — 5A09357 ASSISTANCE WITH RESPIRATORY VENTILATION, LESS THAN 24 CONSECUTIVE HOURS, CONTINUOUS POSITIVE AIRWAY PRESSURE: ICD-10-PCS | Performed by: EMERGENCY MEDICINE

## 2025-01-01 PROCEDURE — 85730 THROMBOPLASTIN TIME PARTIAL: CPT | Performed by: EMERGENCY MEDICINE

## 2025-01-01 PROCEDURE — 87205 SMEAR GRAM STAIN: CPT | Performed by: EMERGENCY MEDICINE

## 2025-01-01 PROCEDURE — 99223 1ST HOSP IP/OBS HIGH 75: CPT | Performed by: REGISTERED NURSE

## 2025-01-01 PROCEDURE — 93306 TTE W/DOPPLER COMPLETE: CPT | Performed by: INTERNAL MEDICINE

## 2025-01-01 PROCEDURE — 5A1945Z RESPIRATORY VENTILATION, 24-96 CONSECUTIVE HOURS: ICD-10-PCS | Performed by: INTERNAL MEDICINE

## 2025-01-01 PROCEDURE — 31500 INSERT EMERGENCY AIRWAY: CPT | Performed by: EMERGENCY MEDICINE

## 2025-01-01 PROCEDURE — 30233N1 TRANSFUSION OF NONAUTOLOGOUS RED BLOOD CELLS INTO PERIPHERAL VEIN, PERCUTANEOUS APPROACH: ICD-10-PCS | Performed by: INTERNAL MEDICINE

## 2025-01-01 PROCEDURE — 87040 BLOOD CULTURE FOR BACTERIA: CPT | Performed by: EMERGENCY MEDICINE

## 2025-01-01 PROCEDURE — 96368 THER/DIAG CONCURRENT INF: CPT

## 2025-01-01 PROCEDURE — 99223 1ST HOSP IP/OBS HIGH 75: CPT | Performed by: INTERNAL MEDICINE

## 2025-01-01 DEVICE — REPLAY HEMOSTASIS CLIP, 11MM SPAN
Type: IMPLANTABLE DEVICE | Status: FUNCTIONAL
Brand: REPLAY

## 2025-01-01 RX ORDER — METHYLPREDNISOLONE SODIUM SUCCINATE 125 MG/2ML
120 INJECTION INTRAMUSCULAR; INTRAVENOUS ONCE
Status: COMPLETED | OUTPATIENT
Start: 2025-01-01 | End: 2025-01-01

## 2025-01-01 RX ORDER — SODIUM CHLORIDE, SODIUM LACTATE, POTASSIUM CHLORIDE, CALCIUM CHLORIDE 600; 310; 30; 20 MG/100ML; MG/100ML; MG/100ML; MG/100ML
INJECTION, SOLUTION INTRAVENOUS CONTINUOUS
Status: DISCONTINUED | OUTPATIENT
Start: 2025-01-01 | End: 2025-01-01

## 2025-01-01 RX ORDER — NALOXONE HYDROCHLORIDE 0.4 MG/ML
0.08 INJECTION, SOLUTION INTRAMUSCULAR; INTRAVENOUS; SUBCUTANEOUS ONCE AS NEEDED
Status: DISCONTINUED | OUTPATIENT
Start: 2025-01-01 | End: 2025-01-01 | Stop reason: HOSPADM

## 2025-01-01 RX ORDER — NICOTINE POLACRILEX 4 MG
30 LOZENGE BUCCAL
Status: DISCONTINUED | OUTPATIENT
Start: 2025-01-01 | End: 2025-01-01

## 2025-01-01 RX ORDER — SODIUM CHLORIDE 9 MG/ML
INJECTION, SOLUTION INTRAVENOUS ONCE
Status: DISCONTINUED | OUTPATIENT
Start: 2025-01-01 | End: 2025-01-01 | Stop reason: HOSPADM

## 2025-01-01 RX ORDER — DIPHENHYDRAMINE HYDROCHLORIDE 50 MG/ML
INJECTION, SOLUTION INTRAMUSCULAR; INTRAVENOUS
Status: COMPLETED
Start: 2025-01-01 | End: 2025-01-01

## 2025-01-01 RX ORDER — FOLIC ACID 1 MG/1
1 TABLET ORAL DAILY
Qty: 30 TABLET | Refills: 0 | Status: SHIPPED | OUTPATIENT
Start: 2025-01-01 | End: 2025-01-01

## 2025-01-01 RX ORDER — DIAZEPAM 2 MG/1
2 TABLET ORAL 2 TIMES DAILY
Status: DISCONTINUED | OUTPATIENT
Start: 2025-01-01 | End: 2025-01-01

## 2025-01-01 RX ORDER — FUROSEMIDE 10 MG/ML
40 INJECTION INTRAMUSCULAR; INTRAVENOUS ONCE
Status: COMPLETED | OUTPATIENT
Start: 2025-01-01 | End: 2025-01-01

## 2025-01-01 RX ORDER — DIAZEPAM 2 MG/1
2 TABLET ORAL DAILY
Status: DISCONTINUED | OUTPATIENT
Start: 2025-01-01 | End: 2025-01-01

## 2025-01-01 RX ORDER — METOCLOPRAMIDE HYDROCHLORIDE 5 MG/ML
5 INJECTION INTRAMUSCULAR; INTRAVENOUS EVERY 8 HOURS PRN
Status: DISCONTINUED | OUTPATIENT
Start: 2025-01-01 | End: 2025-01-01

## 2025-01-01 RX ORDER — DEXTROSE MONOHYDRATE 50 MG/ML
INJECTION, SOLUTION INTRAVENOUS CONTINUOUS
Status: DISCONTINUED | OUTPATIENT
Start: 2025-01-01 | End: 2025-01-01

## 2025-01-01 RX ORDER — DIPHENHYDRAMINE HYDROCHLORIDE 50 MG/ML
25 INJECTION, SOLUTION INTRAMUSCULAR; INTRAVENOUS EVERY 8 HOURS
Status: DISCONTINUED | OUTPATIENT
Start: 2025-01-01 | End: 2025-01-01

## 2025-01-01 RX ORDER — ERGOCALCIFEROL 1.25 MG/1
50000 CAPSULE, LIQUID FILLED ORAL WEEKLY
Status: DISCONTINUED | OUTPATIENT
Start: 2025-01-01 | End: 2025-01-01

## 2025-01-01 RX ORDER — DILTIAZEM HYDROCHLORIDE 30 MG/1
60 TABLET, FILM COATED ORAL EVERY 6 HOURS SCHEDULED
Status: DISCONTINUED | OUTPATIENT
Start: 2025-01-01 | End: 2025-01-01

## 2025-01-01 RX ORDER — SODIUM CHLORIDE 9 MG/ML
INJECTION, SOLUTION INTRAVENOUS CONTINUOUS
Status: DISCONTINUED | OUTPATIENT
Start: 2025-01-01 | End: 2025-01-01

## 2025-01-01 RX ORDER — FAMOTIDINE 10 MG/ML
20 INJECTION, SOLUTION INTRAVENOUS 2 TIMES DAILY
Status: DISCONTINUED | OUTPATIENT
Start: 2025-01-01 | End: 2025-01-01

## 2025-01-01 RX ORDER — DILTIAZEM HCL 90 MG
180 TABLET ORAL 2 TIMES DAILY
COMMUNITY

## 2025-01-01 RX ORDER — LISINOPRIL 40 MG/1
40 TABLET ORAL DAILY
COMMUNITY
End: 2025-01-01

## 2025-01-01 RX ORDER — IPRATROPIUM BROMIDE AND ALBUTEROL SULFATE 2.5; .5 MG/3ML; MG/3ML
3 SOLUTION RESPIRATORY (INHALATION) EVERY 6 HOURS PRN
Status: DISCONTINUED | OUTPATIENT
Start: 2025-01-01 | End: 2025-01-01

## 2025-01-01 RX ORDER — DILTIAZEM HYDROCHLORIDE 5 MG/ML
INJECTION INTRAVENOUS
Status: DISPENSED
Start: 2025-01-01 | End: 2025-01-01

## 2025-01-01 RX ORDER — MAGNESIUM OXIDE 400 MG/1
400 TABLET ORAL ONCE
Status: COMPLETED | OUTPATIENT
Start: 2025-01-01 | End: 2025-01-01

## 2025-01-01 RX ORDER — METOPROLOL SUCCINATE 50 MG/1
50 TABLET, EXTENDED RELEASE ORAL DAILY
COMMUNITY

## 2025-01-01 RX ORDER — CLONAZEPAM 0.5 MG/1
0.5 TABLET ORAL NIGHTLY
Qty: 30 TABLET | Refills: 0 | Status: SHIPPED | OUTPATIENT
Start: 2025-01-01 | End: 2025-01-01

## 2025-01-01 RX ORDER — MIRTAZAPINE 7.5 MG/1
7.5 TABLET, FILM COATED ORAL NIGHTLY
Status: DISCONTINUED | OUTPATIENT
Start: 2025-01-01 | End: 2025-01-01

## 2025-01-01 RX ORDER — LORAZEPAM 2 MG/ML
0.5 INJECTION INTRAMUSCULAR EVERY 6 HOURS PRN
Status: DISCONTINUED | OUTPATIENT
Start: 2025-01-01 | End: 2025-01-01

## 2025-01-01 RX ORDER — HEPARIN SODIUM 1000 [USP'U]/ML
60 INJECTION, SOLUTION INTRAVENOUS; SUBCUTANEOUS ONCE
Status: COMPLETED | OUTPATIENT
Start: 2025-01-01 | End: 2025-01-01

## 2025-01-01 RX ORDER — HEPARIN SODIUM AND DEXTROSE 10000; 5 [USP'U]/100ML; G/100ML
INJECTION INTRAVENOUS CONTINUOUS
Status: DISCONTINUED | OUTPATIENT
Start: 2025-01-01 | End: 2025-01-01

## 2025-01-01 RX ORDER — HEPARIN SODIUM AND DEXTROSE 10000; 5 [USP'U]/100ML; G/100ML
12 INJECTION INTRAVENOUS ONCE
Status: COMPLETED | OUTPATIENT
Start: 2025-01-01 | End: 2025-01-01

## 2025-01-01 RX ORDER — LORAZEPAM 2 MG/ML
0.5 INJECTION INTRAMUSCULAR ONCE
Status: COMPLETED | OUTPATIENT
Start: 2025-01-01 | End: 2025-01-01

## 2025-01-01 RX ORDER — MAGNESIUM SULFATE HEPTAHYDRATE 40 MG/ML
2 INJECTION, SOLUTION INTRAVENOUS ONCE
Status: COMPLETED | OUTPATIENT
Start: 2025-01-01 | End: 2025-01-01

## 2025-01-01 RX ORDER — DIAZEPAM 10 MG/1
5 TABLET ORAL NIGHTLY
Status: DISCONTINUED | OUTPATIENT
Start: 2025-01-01 | End: 2025-01-01

## 2025-01-01 RX ORDER — DEXTROSE MONOHYDRATE 25 G/50ML
50 INJECTION, SOLUTION INTRAVENOUS
Status: DISCONTINUED | OUTPATIENT
Start: 2025-01-01 | End: 2025-01-01

## 2025-01-01 RX ORDER — DIAZEPAM 2 MG/1
2 TABLET ORAL DAILY
COMMUNITY
Start: 2025-01-01 | End: 2025-01-01

## 2025-01-01 RX ORDER — CLONAZEPAM 0.5 MG/1
0.5 TABLET ORAL NIGHTLY
Status: DISCONTINUED | OUTPATIENT
Start: 2025-01-01 | End: 2025-01-01

## 2025-01-01 RX ORDER — DIPHENHYDRAMINE HYDROCHLORIDE 50 MG/ML
50 INJECTION, SOLUTION INTRAMUSCULAR; INTRAVENOUS ONCE
Status: COMPLETED | OUTPATIENT
Start: 2025-01-01 | End: 2025-01-01

## 2025-01-01 RX ORDER — DILTIAZEM HYDROCHLORIDE 5 MG/ML
10 INJECTION INTRAVENOUS ONCE
Status: COMPLETED | OUTPATIENT
Start: 2025-01-01 | End: 2025-01-01

## 2025-01-01 RX ORDER — PANTOPRAZOLE SODIUM 40 MG/1
40 TABLET, DELAYED RELEASE ORAL DAILY
Qty: 30 TABLET | Refills: 0 | Status: SHIPPED | OUTPATIENT
Start: 2025-01-01 | End: 2025-01-01

## 2025-01-01 RX ORDER — DEXTROSE MONOHYDRATE AND SODIUM CHLORIDE 5; .45 G/100ML; G/100ML
INJECTION, SOLUTION INTRAVENOUS CONTINUOUS
Status: DISCONTINUED | OUTPATIENT
Start: 2025-01-01 | End: 2025-01-01

## 2025-01-01 RX ORDER — ESCITALOPRAM OXALATE 5 MG/1
5 TABLET ORAL NIGHTLY
Status: DISCONTINUED | OUTPATIENT
Start: 2025-01-01 | End: 2025-01-01

## 2025-01-01 RX ORDER — DIAZEPAM 5 MG/1
5 TABLET ORAL NIGHTLY
COMMUNITY
End: 2025-01-01

## 2025-01-01 RX ORDER — LORAZEPAM 2 MG/ML
0.5 INJECTION INTRAMUSCULAR
Status: DISCONTINUED | OUTPATIENT
Start: 2025-01-01 | End: 2025-01-01

## 2025-01-01 RX ORDER — DILTIAZEM HYDROCHLORIDE 30 MG/1
30 TABLET, FILM COATED ORAL EVERY 6 HOURS SCHEDULED
Status: DISCONTINUED | OUTPATIENT
Start: 2025-01-01 | End: 2025-01-01

## 2025-01-01 RX ORDER — DIPHENHYDRAMINE HYDROCHLORIDE 50 MG/ML
25 INJECTION, SOLUTION INTRAMUSCULAR; INTRAVENOUS DAILY
Status: DISCONTINUED | OUTPATIENT
Start: 2025-01-01 | End: 2025-01-01

## 2025-01-01 RX ORDER — LISINOPRIL 30 MG/1
30 TABLET ORAL DAILY
COMMUNITY
End: 2025-01-01

## 2025-01-01 RX ORDER — LORAZEPAM 1 MG/1
1 TABLET ORAL
Status: DISCONTINUED | OUTPATIENT
Start: 2025-01-01 | End: 2025-01-01

## 2025-01-01 RX ORDER — LORAZEPAM 0.5 MG/1
0.5 TABLET ORAL 2 TIMES DAILY PRN
Status: DISCONTINUED | OUTPATIENT
Start: 2025-01-01 | End: 2025-01-01

## 2025-01-01 RX ORDER — LORAZEPAM 2 MG/ML
0.5 INJECTION INTRAMUSCULAR 3 TIMES DAILY
Status: DISCONTINUED | OUTPATIENT
Start: 2025-01-01 | End: 2025-01-01

## 2025-01-01 RX ORDER — MELATONIN
100 DAILY
Qty: 30 TABLET | Refills: 0 | Status: SHIPPED | OUTPATIENT
Start: 2025-01-01 | End: 2025-01-01

## 2025-01-01 RX ORDER — METHYLPREDNISOLONE SODIUM SUCCINATE 40 MG/ML
40 INJECTION INTRAMUSCULAR; INTRAVENOUS EVERY 8 HOURS
Status: DISCONTINUED | OUTPATIENT
Start: 2025-01-01 | End: 2025-01-01

## 2025-01-01 RX ORDER — ESCITALOPRAM OXALATE 5 MG/1
5 TABLET ORAL NIGHTLY
Qty: 30 TABLET | Refills: 0 | Status: SHIPPED | OUTPATIENT
Start: 2025-01-01 | End: 2025-01-01

## 2025-01-01 RX ORDER — ACETAMINOPHEN 325 MG/1
650 TABLET ORAL EVERY 4 HOURS PRN
Status: DISCONTINUED | OUTPATIENT
Start: 2025-01-01 | End: 2025-01-01

## 2025-01-01 RX ORDER — MIRTAZAPINE 15 MG/1
15 TABLET, ORALLY DISINTEGRATING ORAL NIGHTLY
Status: DISCONTINUED | OUTPATIENT
Start: 2025-01-01 | End: 2025-01-01

## 2025-01-01 RX ORDER — MELATONIN
100 DAILY
Status: DISCONTINUED | OUTPATIENT
Start: 2025-01-01 | End: 2025-01-01

## 2025-01-01 RX ORDER — ACETAMINOPHEN 10 MG/ML
1000 INJECTION, SOLUTION INTRAVENOUS EVERY 8 HOURS PRN
Status: COMPLETED | OUTPATIENT
Start: 2025-01-01 | End: 2025-01-01

## 2025-01-01 RX ORDER — METHYLPREDNISOLONE SODIUM SUCCINATE 125 MG/2ML
INJECTION INTRAMUSCULAR; INTRAVENOUS
Status: COMPLETED
Start: 2025-01-01 | End: 2025-01-01

## 2025-01-01 RX ORDER — LORAZEPAM 1 MG/1
2 TABLET ORAL
Status: DISCONTINUED | OUTPATIENT
Start: 2025-01-01 | End: 2025-01-01

## 2025-01-01 RX ORDER — ONDANSETRON 2 MG/ML
4 INJECTION INTRAMUSCULAR; INTRAVENOUS EVERY 6 HOURS PRN
Status: DISCONTINUED | OUTPATIENT
Start: 2025-01-01 | End: 2025-01-01

## 2025-01-01 RX ORDER — INDOMETHACIN 25 MG/1
50 CAPSULE ORAL ONCE
Status: COMPLETED | OUTPATIENT
Start: 2025-01-01 | End: 2025-01-01

## 2025-01-01 RX ORDER — ERGOCALCIFEROL 1.25 MG/1
50000 CAPSULE ORAL WEEKLY
COMMUNITY

## 2025-01-01 RX ORDER — FUROSEMIDE 10 MG/ML
20 INJECTION INTRAMUSCULAR; INTRAVENOUS ONCE
Status: COMPLETED | OUTPATIENT
Start: 2025-01-01 | End: 2025-01-01

## 2025-01-01 RX ORDER — CALCIUM GLUCONATE 10 MG/ML
1 INJECTION, SOLUTION INTRAVENOUS ONCE
Status: COMPLETED | OUTPATIENT
Start: 2025-01-01 | End: 2025-01-01

## 2025-01-01 RX ORDER — METHYLPREDNISOLONE SODIUM SUCCINATE 40 MG/ML
40 INJECTION INTRAMUSCULAR; INTRAVENOUS DAILY
Status: DISCONTINUED | OUTPATIENT
Start: 2025-01-01 | End: 2025-01-01

## 2025-01-01 RX ORDER — OLANZAPINE 2.5 MG/1
2.5 TABLET, FILM COATED ORAL NIGHTLY
Status: DISCONTINUED | OUTPATIENT
Start: 2025-01-01 | End: 2025-01-01

## 2025-01-01 RX ORDER — POTASSIUM CHLORIDE 1.5 G/1.58G
40 POWDER, FOR SOLUTION ORAL ONCE
Status: COMPLETED | OUTPATIENT
Start: 2025-01-01 | End: 2025-01-01

## 2025-01-01 RX ORDER — METOPROLOL TARTRATE 25 MG/1
25 TABLET, FILM COATED ORAL
Status: DISCONTINUED | OUTPATIENT
Start: 2025-01-01 | End: 2025-01-01

## 2025-01-01 RX ORDER — THIAMINE HYDROCHLORIDE 100 MG/ML
100 INJECTION, SOLUTION INTRAMUSCULAR; INTRAVENOUS DAILY
Status: DISCONTINUED | OUTPATIENT
Start: 2025-01-01 | End: 2025-01-01

## 2025-01-01 RX ORDER — OLANZAPINE 2.5 MG/1
2.5 TABLET, FILM COATED ORAL NIGHTLY
Qty: 30 TABLET | Refills: 0 | Status: SHIPPED | OUTPATIENT
Start: 2025-01-01 | End: 2025-01-01

## 2025-01-01 RX ORDER — NICOTINE POLACRILEX 4 MG
15 LOZENGE BUCCAL
Status: DISCONTINUED | OUTPATIENT
Start: 2025-01-01 | End: 2025-01-01

## 2025-05-22 ENCOUNTER — ANESTHESIA EVENT (OUTPATIENT)
Dept: ENDOSCOPY | Facility: HOSPITAL | Age: 80
End: 2025-05-22
Payer: MEDICARE

## 2025-05-22 ENCOUNTER — ANESTHESIA (OUTPATIENT)
Dept: ENDOSCOPY | Facility: HOSPITAL | Age: 80
End: 2025-05-22
Payer: MEDICARE

## 2025-05-22 PROBLEM — K92.2 ACUTE GASTROINTESTINAL HEMORRHAGE: Status: ACTIVE | Noted: 2025-05-22

## 2025-05-22 PROBLEM — K92.2 ACUTE GASTROINTESTINAL HEMORRHAGE: Status: ACTIVE | Noted: 2025-01-01

## 2025-05-22 PROCEDURE — 36430 TRANSFUSION BLD/BLD COMPNT: CPT | Performed by: STUDENT IN AN ORGANIZED HEALTH CARE EDUCATION/TRAINING PROGRAM

## 2025-05-22 RX ORDER — PHENYLEPHRINE HCL 10 MG/ML
VIAL (ML) INJECTION AS NEEDED
Status: DISCONTINUED | OUTPATIENT
Start: 2025-05-22 | End: 2025-05-22 | Stop reason: SURG

## 2025-05-22 RX ORDER — LIDOCAINE HYDROCHLORIDE 10 MG/ML
INJECTION, SOLUTION EPIDURAL; INFILTRATION; INTRACAUDAL; PERINEURAL AS NEEDED
Status: DISCONTINUED | OUTPATIENT
Start: 2025-05-22 | End: 2025-05-22 | Stop reason: SURG

## 2025-05-22 RX ORDER — SODIUM CHLORIDE 9 MG/ML
INJECTION, SOLUTION INTRAVENOUS CONTINUOUS PRN
Status: DISCONTINUED | OUTPATIENT
Start: 2025-05-22 | End: 2025-05-22 | Stop reason: SURG

## 2025-05-22 RX ADMIN — PHENYLEPHRINE HCL 100 MCG: 10 MG/ML VIAL (ML) INJECTION at 20:27:00

## 2025-05-22 RX ADMIN — PHENYLEPHRINE HCL 100 MCG: 10 MG/ML VIAL (ML) INJECTION at 19:39:00

## 2025-05-22 RX ADMIN — LIDOCAINE HYDROCHLORIDE 20 MG: 10 INJECTION, SOLUTION EPIDURAL; INFILTRATION; INTRACAUDAL; PERINEURAL at 19:34:00

## 2025-05-22 RX ADMIN — PHENYLEPHRINE HCL 100 MCG: 10 MG/ML VIAL (ML) INJECTION at 19:37:00

## 2025-05-22 RX ADMIN — PHENYLEPHRINE HCL 100 MCG: 10 MG/ML VIAL (ML) INJECTION at 20:22:00

## 2025-05-22 RX ADMIN — SODIUM CHLORIDE: 9 INJECTION, SOLUTION INTRAVENOUS at 19:28:00

## 2025-05-22 NOTE — ED PROVIDER NOTES
Patient Seen in: [facility] Emergency Department       The following individual(s) verbally consented to be recorded using ambient AI listening technology and understand that they can each withdraw their consent to this listening technology at any point by asking the clinician to turn off or pause the recording:    Patient name: Aldo Valdez  Additional names:        History  Chief Complaint   Patient presents with    Bleeding     Stated Complaint: GI bleed    Subjective:   HPI     Aldo Valdez is a 79 year old male who presents with rectal bleeding and diarrhea.    He has been experiencing diarrhea for the past three to four days, accompanied by blood in his stool today. He describes the blood as a 'little bit of blood.'    No abdominal pain or fever. He reports frequent diarrhea, stating 'I got diarrhea' and 'I had the runs.'    He takes prescription aspirin and Eliquis, last dose this morning.    He feels embarrassed about the situation, noting an incident of incontinence while laying down and describes feeling 'very hyper.'    He confirms the presence of blood in his stool and frequent diarrhea.        Objective:     Past Medical History:    A-fib (HCC)    CVA (cerebral vascular accident) (HCC)    Diabetes (HCC)              History reviewed. No pertinent surgical history.             Social History     Socioeconomic History    Marital status:    Tobacco Use    Smoking status: Former     Types: Cigarettes     Passive exposure: Never    Smokeless tobacco: Never   Vaping Use    Vaping status: Never Used   Substance and Sexual Activity    Alcohol use: Yes     Alcohol/week: 6.0 - 7.0 standard drinks of alcohol     Types: 3 - 4 Standard drinks or equivalent, 3 Cans of beer per week     Social Drivers of Health     Food Insecurity: Low Risk  (3/11/2022)    Received from [facility]    Food Insecurity     Have there been times that your food ran out, and you didn't have money to  get more?: No     Are there times that you worry that this might happen?: No   Transportation Needs: Low Risk  (3/11/2022)    Received from [facility]    Transportation Needs     Do you have trouble getting transportation to medical appointments?: No   Housing Stability: Low Risk  (3/11/2022)    Received from [facility]    Housing Stability     Are you concerned about having a safe and reliable place to live?: No                                Physical Exam    ED Triage Vitals [05/22/25 1426]   /61   Pulse 80   Resp 20   Temp 98 °F (36.7 °C)   Temp src Temporal   SpO2 97 %   O2 Device None (Room air)       Current Vitals:   Vital Signs  BP: 118/57  Pulse: 79  Resp: 19  Temp: 97 °F (36.1 °C)  Temp src: Temporal  MAP (mmHg): 76    Oxygen Therapy  SpO2: 97 %  O2 Device: None (Room air)        Pertinent physical exam:      ABDOMEN: Abdomen non-tender.  RECTAL: Active rectal bleeding with copious dark colored blood.       Physical Exam  Vitals and nursing note reviewed.   Constitutional:       Appearance: Normal appearance. He is well-developed. He is not ill-appearing or diaphoretic.   HENT:      Head: Normocephalic and atraumatic.      Right Ear: External ear normal.      Left Ear: External ear normal.      Nose: Nose normal. No nasal deformity.      Mouth/Throat:      Lips: Pink.   Eyes:      General: Lids are normal.      Extraocular Movements: Extraocular movements intact.   Cardiovascular:      Rate and Rhythm: Tachycardia present.      Pulses:           Radial pulses are 2+ on the right side and 2+ on the left side.   Pulmonary:      Effort: Pulmonary effort is normal. No respiratory distress.   Abdominal:      General: Bowel sounds are normal.      Palpations: Abdomen is soft.      Tenderness: There is no abdominal tenderness. There is no guarding or rebound.   Musculoskeletal:         General: No deformity. Normal range of motion.   Skin:     General: Skin is  dry.      Coloration: Skin is not cyanotic or pale.   Neurological:      General: No focal deficit present.      Mental Status: He is alert and oriented to person, place, and time.      Gait: Gait is intact.   Psychiatric:         Attention and Perception: Attention normal.         Mood and Affect: Mood normal.         Speech: Speech normal.                 ED Course  Labs Reviewed   CBC WITH DIFFERENTIAL WITH PLATELET - Abnormal; Notable for the following components:       Result Value    RBC 3.04 (*)     HGB 7.3 (*)     HCT 23.6 (*)     MCV 77.6 (*)     MCH 24.0 (*)     MCHC 30.9 (*)     RDW 16.1 (*)     All other components within normal limits   COMP METABOLIC PANEL (14) - Abnormal; Notable for the following components:    Glucose 124 (*)     CO2 14.0 (*)     BUN 40 (*)     Creatinine 2.05 (*)     Calculated Osmolality 301 (*)     eGFR-Cr 33 (*)     Globulin  3.6 (*)     All other components within normal limits   HEMOGLOBIN - Abnormal; Notable for the following components:    HGB 6.4 (*)     All other components within normal limits   PROTHROMBIN TIME (PT) - Abnormal; Notable for the following components:    PT 25.3 (*)     INR 2.17 (*)     All other components within normal limits   PTT, ACTIVATED - Abnormal; Notable for the following components:    PTT 48.8 (*)     All other components within normal limits   HEMOGLOBIN + HEMATOCRIT - Abnormal; Notable for the following components:    HGB 7.4 (*)     HCT 23.6 (*)     All other components within normal limits   TYPE AND SCREEN    Narrative:     The following orders were created for panel order Type and screen.  Procedure                               Abnormality         Status                     ---------                               -----------         ------                     ABORH (Blood Type)[[id number]]                               Final result               Antibody Screen[[id number]]                                  Final result                 Please  view results for these tests on the individual orders.   ABORH (BLOOD TYPE)   ANTIBODY SCREEN   ABORH CONFIRMATION   PREPARE RBC   PREPARE RBC   PREPARE RBC   RAINBOW DRAW LAVENDER   RAINBOW DRAW LIGHT GREEN   RAINBOW DRAW BLUE     EKG    Rate, intervals and axes as noted on EKG Report.  Rate: 84  Rhythm: Atrial Fibrillation  Reading: no stemi, interpreted by myself.            ED Course as of 05/22/25 1906  ------------------------------------------------------------  Time: 05/22 1904  Value: CTA ABD/PEL (CPT=74174)  Comment: Per my independent interpretation, patient's CT Abdomen and Pelvis demonstrates sigmoid diverticular bleed.                         MDM     Central Line Placement Procedure Note  Indications: Vascular access    Consent: Informed Consent: emergent procedure    Prior to procedure documentation was reviewed, informed consent was obtained if possible with risks, benefits and alternative discussed, appropriate equipment was present, a time out was performed to identify the correct patient, procedure and site.    Anesthesia:   Anesthesia:  Local infiltration  Local anesthetic:  lidocaine 1% without epinephrine  Anesthetic total (mL):  3    Procedure Details:  Preparation: Skin prepped with 2% chlorhexidine   Skin prep agent completely dried prior to procedure.  Sterile Barriers:  All five maximal sterile barriers used - gloves, gown, cap, mask and large sterile sheet  Hand hygiene:  Hand hygiene performed prior to central venous catheter insertion.  Location:  Right femoral  Patient Position:  Flat  Catheter Type:  Single lumen 8.5 Fr    Pre-Procedure:  Landmarks identified  Ultrasound Guidance:  yes  Number of Attempts:  1  Successful placement:  yes    Post Procedure  Post-Procedure:  Line sutured and Dressing applied  Assessment:  Blood return through all ports, Free fluid flow, Complications:  None  Patient tolerance:  Patient tolerated the procedure well with no immediate  complications.        Admission disposition: 5/22/2025  4:56 PM           Medical Decision Making  Differential diagnosis includes but is not limited to upper gastrointestinal bleed, lower intestinal bleed, infectious diarrhea, etc    Rectal bleeding:  He presents with active rectal bleeding and significant blood loss, exacerbated by Eliquis use. Hemoglobin is critically low at 7.3 and dropping to 6.4 g/dL, necessitating immediate intervention and rapid transfusion of 2 units of pRBCs.  A Cordis central line was placed for rapid blood transfusion. Admit to ICU for close monitoring and management.  CTA obtained and reveals an acute diverticular bleed. GI specialist for further evaluation and plans to take to OR for colonoscopy for clipping of diverticular bleed and would like an additional unit of pRBCs over 4 hours and a tap water enema.      Use of anticoagulant therapy:  Eliquis is contributing to the rectal bleeding, posing a risk of continued bleeding against the need for anticoagulation. Discuss anticoagulation management with his caregiver and medical team. Administer Kcentra to reverse anticoagulation.     Rhythm Strip: Rate 125 sinus rhythm as interpreted by myself. The cardiac monitor was ordered secondary to the patient's gastrointestinal hemorrhage.     Complicating factors: The patient  has a past medical history of A-fib (HCC), CVA (cerebral vascular accident) (HCC), and Diabetes (HCC). and  has no past surgical history on file. that contribute to the medical complexity of this ED evaluation.     Medical Record Review: I personally reviewed available prior medical records for any recent pertinent discharge summaries, testing, and procedures, and reviewed those reports.      Problems Addressed:  Acute gastrointestinal hemorrhage: acute illness or injury with systemic symptoms that poses a threat to life or bodily functions    Amount and/or Complexity of Data Reviewed  Independent Historian: EMS      Details: EMS reports GI bleed. Discussed with guardian who states patient receive Eliquis this morning.   External Data Reviewed: labs.     Details: Hgb on 4/6/22 was 13  Labs: ordered. Decision-making details documented in ED Course.  Radiology: ordered and independent interpretation performed. Decision-making details documented in ED Course.  ECG/medicine tests: ordered and independent interpretation performed. Decision-making details documented in ED Course.  Discussion of management or test interpretation with external provider(s): Discussed with ED Pharmacist Amilcar who recs [facility].  Dr. Liz accepts admission to ICU and Dr. Byrne accepts GI consult and will take to OR.    Risk  Decision regarding hospitalization.  Minor surgery with identified risk factors.  Risk Details: Central line placement while on Eliquis    Critical Care  Total time providing critical care: minutes (49 minutes including time spent examining and re-evaluating the patient, ordering and reviewing laboratory tests, documenting, reviewing previous records, obtaining information from the family, and speaking with consultants, admitting doctors, nurses and medics and excludes any time spent on procedures.  )        Disposition and Plan     Clinical Impression:  1. Acute gastrointestinal hemorrhage         Disposition:  Admit  5/22/2025  4:56 pm    Follow-up:  No follow-up provider specified.  We recommend that you schedule follow up care with a primary care provider within the next three months to obtain basic health screening including reassessment of your blood pressure.      Medications Prescribed:  Current Discharge Medication List                Supplementary Documentation:         Hospital Problems       Present on Admission  Date Reviewed: 11/27/2020          ICD-10-CM Noted POA    * (Principal) Acute gastrointestinal hemorrhage K92.2 5/22/2025 Unknown

## 2025-05-22 NOTE — ANESTHESIA PREPROCEDURE EVALUATION
Anesthesia PreOp Note    HPI:     Aldo Valdez is a 79 year old male who presents for preoperative consultation requested by: Cesar Byrne MD    Date of Surgery: 5/22/2025    Procedure(s):  ESOPHAGOGASTRODUODENOSCOPY (EGD)  Indication: acute gi bleed    Relevant Problems   No relevant active problems       NPO:                         History Review:  Patient Active Problem List    Diagnosis Date Noted    Acute gastrointestinal hemorrhage 05/22/2025    Anxiety 10/26/2016    Cerebral infarction involving posterior cerebral artery, right (HCC) 10/11/2016    Pure hypercholesterolemia 05/02/2012    Essential hypertension 05/19/2010    Back pain 05/19/2010       Past Medical History[1]    Past Surgical History[2]    Prescriptions Prior to Admission[3]  Current Medications and Prescriptions Ordered in Epic[4]    Allergies[5]    Family History[6]  Social Hx on file[7]    Available pre-op labs reviewed.  Lab Results   Component Value Date    WBC 4.6 05/22/2025    RBC 3.04 (L) 05/22/2025    HGB 6.4 (LL) 05/22/2025    HCT 23.6 (L) 05/22/2025    MCV 77.6 (L) 05/22/2025    MCH 24.0 (L) 05/22/2025    MCHC 30.9 (L) 05/22/2025    RDW 16.1 (H) 05/22/2025    .0 05/22/2025     Lab Results   Component Value Date     05/22/2025    K 5.1 05/22/2025     05/22/2025    CO2 14.0 (L) 05/22/2025    BUN 40 (H) 05/22/2025    CREATSERUM 2.05 (H) 05/22/2025     (H) 05/22/2025    CA 8.9 05/22/2025     Lab Results   Component Value Date    INR 2.17 (H) 05/22/2025       Vital Signs:  Body mass index is 26.58 kg/m².   height is 1.753 m (5' 9\") and weight is 81.6 kg (180 lb). His temporal temperature is 97 °F (36.1 °C). His blood pressure is 118/57 and his pulse is 79. His respiration is 19 and oxygen saturation is 97%.   Vitals:    05/22/25 1745 05/22/25 1800 05/22/25 1815 05/22/25 1830   BP: 130/61 121/61 116/69 118/57   Pulse: 81 77 88 79   Resp: 21 26 21 19   Temp:       TempSrc:       SpO2: 97% 97% 97% 97%    Weight:       Height:            Anesthesia Evaluation     Patient summary reviewed and Nursing notes reviewed    No history of anesthetic complications   Airway   Mallampati: unable to assess  Dental      Pulmonary    Cardiovascular   (+) hypertension, dysrhythmias (afib on eliquis)    Neuro/Psych    (+)  CVA, anxiety/panic attacks,        GI/Hepatic/Renal      Endo/Other    (+) diabetes mellitus  Abdominal                  Anesthesia Plan:   ASA:  3  Emergent    Plan:   MAC  Plan Comments: S/p 2u pRBC in ED, 2 more units on hold. Kcentra given 530pm.  Informed Consent Plan and Risks Discussed With:  Patient and healthcare power of  (POA Juanito)      I have informed Aldo Valdez and/or legal guardian or family member of the nature of the anesthetic plan, benefits, risks including possible dental damage if relevant, major complications, and any alternative forms of anesthetic management.   All of the patient's questions were answered to the best of my ability. The patient desires the anesthetic management as planned.  Anthony Jimenez MD  5/22/2025 6:58 PM  Present on Admission:  **None**           [1]   Past Medical History:   A-fib (HCC)    CVA (cerebral vascular accident) (HCC)    Diabetes (HCC)   [2] History reviewed. No pertinent surgical history.  [3] (Not in a hospital admission)  [4]   Current Facility-Administered Medications Ordered in Epic   Medication Dose Route Frequency Provider Last Rate Last Admin    calcium gluconate 1g in 100mL iso-NaCl IVPB premix  1 g Intravenous Once Tc Bingham MD         Current Outpatient Medications Ordered in Epic   Medication Sig Dispense Refill    diazePAM 2 MG Oral Tab Take 1 tablet (2 mg total) by mouth daily.      diazePAM 5 MG Oral Tab Take 1 tablet (5 mg total) by mouth at bedtime.      dilTIAZem 90 MG Oral Tab Take 2 tablets (180 mg total) by mouth 2 (two) times daily. HOLD if blood pressure <100 and heart rate <60      apixaban 5 MG Oral Tab  Take 1 tablet (5 mg total) by mouth 2 (two) times daily.      lisinopril 30 MG Oral Tab Take 1 tablet (30 mg total) by mouth before bedtime.      lisinopril 40 MG Oral Tab Take 1 tablet (40 mg total) by mouth in the morning.      metFORMIN 500 MG Oral Tab Take 1 tablet (500 mg total) by mouth 2 (two) times daily with meals.      metoprolol succinate ER 50 MG Oral Tablet 24 Hr Take 1 tablet (50 mg total) by mouth in the morning.      ergocalciferol 1.25 MG (73041 UT) Oral Cap Take 1 capsule (50,000 Units total) by mouth once a week. Taking dose on Fridays     [5] No Known Allergies  [6] No family history on file.  [7]   Social History  Socioeconomic History    Marital status:    Tobacco Use    Smoking status: Former     Types: Cigarettes     Passive exposure: Never    Smokeless tobacco: Never   Vaping Use    Vaping status: Never Used   Substance and Sexual Activity    Alcohol use: Yes     Alcohol/week: 6.0 - 7.0 standard drinks of alcohol     Types: 3 - 4 Standard drinks or equivalent, 3 Cans of beer per week

## 2025-05-22 NOTE — H&P
[facility] Hospitalist H&P       CC: rectal bleeding     PCP: Wyatt Ross DO    History of Present Illness:   This is a 79-year-old male with history of dementia, stroke, atrial fibrillation on Eliquis, hypertension, type 2 diabetes, who presents to the hospital for evaluation of rectal bleeding.  The patient states he was having diarrhea the last few days.  He states today however the stool was watery with blood.  He states he was not having rectal bleeding yesterday.  Patient arrived here via EMS.  Patient noted to have a hemoglobin of 6.4.  He is started on rapid blood transfusion and reversal of his anticoagulation.     Review of Systems  Comprehensive ROS reviewed and negative except for what's stated above.     PMH  Past Medical History[1]     PSH  Past Surgical History[2]     ALL:  Allergies[3]     Home Medications:  Need to clarify medication list patient was note aware     Soc Hx  Social History     Tobacco Use    Smoking status: Former     Types: Cigarettes     Passive exposure: Never    Smokeless tobacco: Never   Substance Use Topics    Alcohol use: Yes     Alcohol/week: 6.0 - 7.0 standard drinks of alcohol     Types: 3 - 4 Standard drinks or equivalent, 3 Cans of beer per week        Fam Hx  Family History[4]      OBJECTIVE:  /66   Pulse 114   Temp 98 °F (36.7 °C) (Temporal)   Resp 25   Ht 5' 9\" (1.753 m)   Wt 180 lb (81.6 kg)   SpO2 96%   BMI 26.58 kg/m²   General: awake, pale appearing  Heart: Tachycardic  Lungs: CTABL  Abdomen: soft  Extremities: cool to touch     Diagnostic Data:    CBC/Chem  Recent Labs   Lab 05/22/25  1424   WBC 4.6   HGB 7.3*   MCV 77.6*   .0       Recent Labs   Lab 05/22/25  1424      K 5.1      CO2 14.0*   BUN 40*   CREATSERUM 2.05*   *   CA 8.9       Recent Labs   Lab 05/22/25  1424   ALT 14   AST 15   ALB 3.7     ASSESSMENT / PLAN:   This is a 79-year-old male with history of dementia, stroke, atrial fibrillation on Eliquis,  hypertension, type 2 diabetes, who presents to the hospital for evaluation of rectal bleeding.     Rectal bleeding   Hypotension, likely some component of hemorrhagic shock  Acute blood loss anemia  High suspicion for diverticular hemorrhage vs other etiology of lower GI bleed  -2 units of PRBC stat  -getting Kcentra infusion  -NPO  -CTA abdomen and pelvis STAT  -GI consulted   -admit to PCU for close monitoring   -central line being placed in ER  -serial Hemoglobin monitoring  -PPI IV   -may need IR involvement pending GI consult and CTA    Atrial fibrillation  -hold eliquis  -need to review medications to see if on other rate control agents     Dementia  -resides at the [facility]    Fluids: continue IV saline  Diet: NPO  DVT prophylaxis: hold chemical prophy    [facility] Hospitalist              [1]   Past Medical History:   A-fib (HCC)    CVA (cerebral vascular accident) (HCC)    Diabetes (HCC)   [2] History reviewed. No pertinent surgical history.  [3] No Known Allergies  [4] No family history on file.

## 2025-05-22 NOTE — ED QUICK NOTES
Geovany from from [facility] aware that patient is actively having severe rectal bleeding and that patient will get admitted to ICU and will have a colonoscopy and will get medication to reverse Eliquis.  Per Geovany pt did took his Eliquis this AM. Geovany will fax over his medication list.

## 2025-05-22 NOTE — ED INITIAL ASSESSMENT (HPI)
Patient arrives via EMS for rectal bleeding. Patient called 911 when he saw bloody stool. EMS report seeing blood and clots on the floor of his home. Patient is a/ox3.

## 2025-05-22 NOTE — ED QUICK NOTES
MD aware that pt is having worsening rectal bleeding. Dr. Bingham at bedside. Verbal order to give blood transfusion with the rapid blood infusion

## 2025-05-22 NOTE — CM/SW NOTE
Received message from [facility] at [place] [phone] stating that patient is with the [facility] Public Guardian's office and in order for him to have any procedures or testing done, please notify Juanito,  with [facility] Public Guardian's office, who is the Guardian for patient at his cell number [phone].    If you have any further questions, please call [facility] at [place] at [phone].

## 2025-05-23 NOTE — PROGRESS NOTES
[facility] Hospitalist Progress Note     CC: Hospital Follow up    PCP: Wyatt Ross DO       Assessment/Plan:   This is a 79-year-old male with history of dementia, stroke, atrial fibrillation on Eliquis, hypertension, type 2 diabetes, who presents to the hospital for evaluation of rectal bleeding.      Rectal bleeding   Hypotension, likely had component of hemorrhagic shock  Acute blood loss anemia  High suspicion for diverticular hemorrhage  -2 units of PRBC given stat in ER  -Kcentra infusion in ER  -CTA abdomen and pelvis with active extravasation in sigmoid   -GI consulted   -underwent colonoscopy urgently on admission to attempt to clip/stop bleed  -serial Hemoglobin monitoring  -PPI IV   -if further bleeding may need IR involvement      Atrial fibrillation  -hold eliquis  -takes diltiazem 180mg BID, and toprol 50mg daily   -for today will start lopressor 25mg BID      Dementia  -resides at the [facility] [place]     Fluids: stop IV fluids, starting clear liquids  Diet: clear liquids  DVT prophylaxis: hold chemical prophy     Noe Liz DO  [facility] Hospitalist        Subjective:     Frustrated about eating, and wants a drink or food immediately   Vitals ok    OBJECTIVE:    Blood pressure 143/78, pulse 90, temperature 97 °F (36.1 °C), temperature source Temporal, resp. rate 15, height 5' 9\" (1.753 m), weight 157 lb 6.5 oz (71.4 kg), SpO2 100%.    Temp:  [96.8 °F (36 °C)-98.2 °F (36.8 °C)] 97 °F (36.1 °C)  Pulse:  [] 90  Resp:  [12-28] 15  BP: ()/(49-78) 143/78  SpO2:  [96 %-100 %] 100 %      Intake/Output:    Intake/Output Summary (Last 24 hours) at 5/23/2025 0950  Last data filed at 5/23/2025 0600  Gross per 24 hour   Intake 1880 ml   Output 350 ml   Net 1530 ml       Last 3 Weights   05/22/25 2238 157 lb 6.5 oz (71.4 kg)   05/22/25 1426 180 lb (81.6 kg)   10/08/24 1853 180 lb (81.6 kg)   12/18/19 0857 188 lb (85.3 kg)       /78 (BP Location: Left arm)   Pulse  90   Temp 97 °F (36.1 °C) (Temporal)   Resp 15   Ht 5' 9\" (1.753 m)   Wt 157 lb 6.5 oz (71.4 kg)   SpO2 100%   BMI 23.25 kg/m²   General: Alert, no acute distress  Lungs: clear to ausculation bilaterally  Heart: Regular rate and rhythm  Abdomen: soft, non tender  Extremities: No edema      Data Review:       Labs:     Recent Labs   Lab 05/22/25  1424 05/22/25  1643 05/22/25  1849 05/22/25  2343 05/23/25 0527   RBC 3.04*  --   --   --  3.26*   HGB 7.3*   < > 7.4* 8.4* 8.6*   HCT 23.6*  --  23.6*  --  26.8*   MCV 77.6*  --   --   --  82.2   MCH 24.0*  --   --   --  26.4   MCHC 30.9*  --   --   --  32.1   RDW 16.1*  --   --   --  16.2*   NEPRELIM 3.36  --   --   --  2.31   WBC 4.6  --   --   --  4.8   .0  --   --   --  257.0    < > = values in this interval not displayed.         Recent Labs   Lab 05/22/25 1424 05/23/25 0527   * 90   BUN 40* 43*   CREATSERUM 2.05* 1.72*   EGFRCR 33* 40*   CA 8.9 8.0*    142   K 5.1 4.3    119*   CO2 14.0* 16.0*       Recent Labs   Lab 05/22/25 1424 05/23/25 0527   ALT 14 9*   AST 15 12   ALB 3.7 2.9*         Imaging:  No results found.      Meds:     Scheduled Medications[1]  Medication Infusions[2]  PRN Medications[3]                 [1]    pantoprazole  40 mg Intravenous Q12H   [2]    sodium chloride 100 mL/hr at 05/22/25 2275    lactated ringers     [3]   ondansetron    metoclopramide    glucose **OR** glucose **OR** glucose-vitamin C **OR** dextrose **OR** glucose **OR** glucose **OR** glucose-vitamin C

## 2025-05-23 NOTE — CONSULTS
[facility]  part of [facility]    Report of Consultation    Aldo Valdez Patient Status:  Emergency    1945 MRN [id number]   Location [facility] ENDOSCOPY LAB SUITES Attending No att. providers found   Hosp Day # 0 PCP Wyatt Ross DO     Date of Admission:  2025  Date of Consult:  2025  Reason for Consultation:   Acute Gi bleeding    History of Present Illness:     This is a 78 y/o male with pmh sig for DM II, HTN, Afib on anticoagulation, CVA, h/o dementia who was admitted to [facility] secondary to Gi bleeding.   Patient is a poor historian.  He states that he has been having some loose stools the past 3-4 days.  Bleeding started today.  Mostly maroon colored stools.  He denies any abdominal pain.  He has had multiple episodes of rectal bleeding since admission.  He was hypotensive while in the ER.  Hemoglobin was noted to be at 7.3.   Last hgb prior to that was in  and was 13.  He was sent for CT angiogram.  Active GI bleeding in the sigmoid colon.        Past Medical History  Past Medical History[1]    Past Surgical History  Past Surgical History[2]    Family History  Family History[3]    Social History  Pediatric History   Patient Parents    Not on file     Other Topics Concern    Not on file   Social History Narrative    Not on file           Current Medications:  Current Hospital Medications[4]  Prescriptions Prior to Admission[5]    Allergies  Allergies[6]    Review of Systems:   GENERAL HEALTH: feels well otherwise, denies fever or weight loss  SKIN: denies any unusual skin lesions or rashes  EYES: no visual complaints or deficits  HEENT: denies mouth sores  RESPIRATORY: no shortness of breath   CARDIOVASCULAR:no chest pain   GI: Refer to HPI,     Physical Exam:   Blood pressure 118/57, pulse 79, temperature 97 °F (36.1 °C), temperature source Temporal, resp. rate 19, height 5' 9\" (1.753 m), weight 180 lb (81.6 kg), SpO2 97%.  GENERAL: well  developed, in no apparent distress  SKIN: no rashes,no suspicious lesions  HEENT: atraumatic, normocephalic, oropharynx clear  NECK: supple,no adenopathy, no masses  LUNGS: clear to auscultation  CARDIO:irregular  GI: normal active BS, no tenderness on palpation, no masses or ascites, normal liver span/no organomegaly  EXTREMITIES: no calf tenderness  MUSCULOSKELETAL: no calf tenderness  PSYCH: normal affect        Results:     Laboratory Data:  Recent Labs   Lab 05/22/25  1424 05/22/25  1643 05/22/25  1849   WBC 4.6  --   --    HGB 7.3* 6.4* 7.4*   HCT 23.6*  --  23.6*   .0  --   --    CREATSERUM 2.05*  --   --    BUN 40*  --   --      --   --    K 5.1  --   --      --   --    CO2 14.0*  --   --    *  --   --    CA 8.9  --   --    ALB 3.7  --   --    ALKPHO 54  --   --    TP 7.3  --   --    AST 15  --   --    ALT 14  --   --    PTT 48.8*  --   --    INR 2.17*  --   --    PTP 25.3*  --   --        No results for input(s): \"YOHANA\", \"LIP\", \"GGT\", \"PSA\", \"DDIMER\", \"ESRML\", \"ESRPF\", \"CRP\", \"BNP\", \"TROP\", \"CK\", \"CKMB\", \"JAKOB\", \"RPR\", \"B12\", \"ETOH\", \"POCGLU\" in the last 168 hours.    Invalid input(s): \"RF\"     Imaging:  No results found.     Impression:    Acute Gi bleeding   Acute blood loss anemia   Rectal bleeding  -suggestive of lower GI bleeding and likely from sigmoid colon /rectosigmoid region as suggested on STAT CT angiogram.    Suspect diverticular bleeding.   -no colitis noted on CT scan.   -given 2 units of prbc's and Kecentra  -clinically slowly improved but still with evidence of bleeding.  -brisk ugi bleed less likely, but not ruled out          Recommendations:   Monitor hgb levels and transfuse to keep above 8.    Urgent Flex sig/colonoscopy today and possible EGD   May need IR evaluation if not able to be stopped by endoscopic evaluation.        Time spent in direct patient contact and decision making as well as counseling/coordination of care:  70 minutes  Thank you for allowing  me to participate in the care of your patient.    Cesar Byrne MD      5/22/2025         [1]   Past Medical History:   A-fib (HCC)    CVA (cerebral vascular accident) (HCC)    Diabetes (HCC)   [2] History reviewed. No pertinent surgical history.  [3] No family history on file.  [4]   Current Facility-Administered Medications   Medication Dose Route Frequency    calcium gluconate 1g in 100mL iso-NaCl IVPB premix  1 g Intravenous Once   [5]   Medications Prior to Admission   Medication Sig    diazePAM 2 MG Oral Tab Take 1 tablet (2 mg total) by mouth daily.    diazePAM 5 MG Oral Tab Take 1 tablet (5 mg total) by mouth at bedtime.    dilTIAZem 90 MG Oral Tab Take 2 tablets (180 mg total) by mouth 2 (two) times daily. HOLD if blood pressure <100 and heart rate <60    apixaban 5 MG Oral Tab Take 1 tablet (5 mg total) by mouth 2 (two) times daily.    lisinopril 30 MG Oral Tab Take 1 tablet (30 mg total) by mouth before bedtime.    lisinopril 40 MG Oral Tab Take 1 tablet (40 mg total) by mouth in the morning.    metFORMIN 500 MG Oral Tab Take 1 tablet (500 mg total) by mouth 2 (two) times daily with meals.    metoprolol succinate ER 50 MG Oral Tablet 24 Hr Take 1 tablet (50 mg total) by mouth in the morning.    ergocalciferol 1.25 MG (38063 UT) Oral Cap Take 1 capsule (50,000 Units total) by mouth once a week. Taking dose on Fridays   [6] No Known Allergies

## 2025-05-23 NOTE — ANESTHESIA POSTPROCEDURE EVALUATION
Patient: Aldo Valdez    Procedure Summary       Date: 05/22/25 Room / Location: [facility] ENDOSCOPY 01 / [facility] ENDOSCOPY    Anesthesia Start: 1927 Anesthesia Stop: 2104    Procedures:       COLONOSCOPY      ESOPHAGOGASTRODUODENOSCOPY (EGD) Diagnosis: (diverticulosis; colon polyp; internal/external hemorrhoids; hiatal hernia)    Surgeons: Cesar Byrne MD Anesthesiologist: Anthony Jimenez MD    Anesthesia Type: MAC ASA Status: 3 - Emergent            Anesthesia Type: MAC    Vitals Value Taken Time   /54 05/22/25 21:00   Temp  05/22/25 21:04   Pulse 80 05/22/25 21:04   Resp 18 05/22/25 21:04   SpO2 97 % 05/22/25 21:04   Vitals shown include unfiled device data.    [facility] AN Post Evaluation:   Patient Evaluated in PACU  Patient Participation: waiting for patient participation  Level of Consciousness: sleepy but conscious  Pain Score: 0  Pain Management: adequate  Airway Patency:patent  Dental exam unchanged from preop  Yes    Nausea/Vomiting: none  Cardiovascular Status: acceptable and hemodynamically stable  Respiratory Status: acceptable  Postoperative Hydration acceptable      Anthony Jimenez MD  5/22/2025 9:04 PM

## 2025-05-23 NOTE — PLAN OF CARE
No active bleeding noted, BL soft wrist restraints started after pt intermittently getting confused, repeatedly attempting to exit bed and discontinue equipment despite verbal redirection. BP, HR stable overnight.     Problem: Diabetes/Glucose Control  Goal: Glucose maintained within prescribed range  Description: INTERVENTIONS:  - Monitor Blood Glucose as ordered  - Assess for signs and symptoms of hyperglycemia and hypoglycemia  - Administer ordered medications to maintain glucose within target range  - Assess barriers to adequate nutritional intake and initiate nutrition consult as needed  - Instruct patient on self management of diabetes  Outcome: Progressing     Problem: CARDIOVASCULAR - ADULT  Goal: Maintains optimal cardiac output and hemodynamic stability  Description: INTERVENTIONS:  - Monitor vital signs, rhythm, and trends  - Monitor for bleeding, hypotension and signs of decreased cardiac output  - Evaluate effectiveness of vasoactive medications to optimize hemodynamic stability  - Monitor arterial and/or venous puncture sites for bleeding and/or hematoma  - Assess quality of pulses, skin color and temperature  - Assess for signs of decreased coronary artery perfusion - ex. Angina  - Evaluate fluid balance, assess for edema, trend weights  Outcome: Progressing  Goal: Absence of cardiac arrhythmias or at baseline  Description: INTERVENTIONS:  - Continuous cardiac monitoring, monitor vital signs, obtain 12 lead EKG if indicated  - Evaluate effectiveness of antiarrhythmic and heart rate control medications as ordered  - Initiate emergency measures for life threatening arrhythmias  - Monitor electrolytes and administer replacement therapy as ordered  Outcome: Progressing     Problem: GASTROINTESTINAL - ADULT  Goal: Minimal or absence of nausea and vomiting  Description: INTERVENTIONS:  - Maintain adequate hydration with IV or PO as ordered and tolerated  - Nasogastric tube to low intermittent suction as  ordered  - Evaluate effectiveness of ordered antiemetic medications  - Provide nonpharmacologic comfort measures as appropriate  - Advance diet as tolerated, if ordered  - Obtain nutritional consult as needed  - Evaluate fluid balance  Outcome: Progressing  Goal: Maintains or returns to baseline bowel function  Description: INTERVENTIONS:  - Assess bowel function  - Maintain adequate hydration with IV or PO as ordered and tolerated  - Evaluate effectiveness of GI medications  - Encourage mobilization and activity  - Obtain nutritional consult as needed  - Establish a toileting routine/schedule  - Consider collaborating with pharmacy to review patient's medication profile  Outcome: Progressing     Problem: HEMATOLOGIC - ADULT  Goal: Maintains hematologic stability  Description: INTERVENTIONS  - Assess for signs and symptoms of bleeding or hemorrhage  - Monitor labs and vital signs for trends  - Administer supportive blood products/factors, fluids and medications as ordered and appropriate  - Administer supportive blood products/factors as ordered and appropriate  Outcome: Progressing  Goal: Free from bleeding injury  Description: (Example usage: patient with low platelets)  INTERVENTIONS:  - Avoid intramuscular injections, enemas and rectal medication administration  - Ensure safe mobilization of patient  - Hold pressure on venipuncture sites to achieve adequate hemostasis  - Assess for signs and symptoms of internal bleeding  - Monitor lab trends  - Patient is to report abnormal signs of bleeding to staff  - Avoid use of toothpicks and dental floss  - Use electric shaver for shaving  - Use soft bristle tooth brush  - Limit straining and forceful nose blowing  Outcome: Progressing

## 2025-05-23 NOTE — PHYSICAL THERAPY NOTE
PHYSICAL THERAPY EVALUATION - INPATIENT     Room Number: 222/222-A  Evaluation Date: 5/23/2025  Type of Evaluation: Initial   Physician Order: PT Eval and Treat    Presenting Problem: acute gastrointestinal hemorrhage, rectal bleeding, hypotension, s/p EGD and colonoscopy with hot cautery snare and clips  Co-Morbidities : dementia, stroke, atrial fibrillation on Eliquis, hypertension, type 2 diabetes  Reason for Therapy: Mobility Dysfunction and Discharge Planning    PHYSICAL THERAPY ASSESSMENT   Patient is a 79 year old male admitted 5/22/2025 for acute gastrointestinal hemorrhage, rectal bleeding, hypotension, s/p EGD and colonoscopy with hot cautery snare and clips. Prior to admission, patient's reported baseline is ambulatory without assistive device. patient reports that he \"walks everywhere\" when asked the typical distance he has to walk on any given day. Patient is currently functioning near baseline with bed mobility, transfers, and maintaining seated position.  Patient is requiring contact guard assist as a result of the following impairments: decreased functional strength, decreased endurance/aerobic capacity, decreased muscular endurance, cognitive deficits (slightly agitated, unable to redirect at times), and medical status.  Physical Therapy will continue to follow for duration of hospitalization.    Patient will benefit from continued skilled PT Services at discharge to promote prior level of function and safety with additional support and return home with home health PT.    PLAN DURING HOSPITALIZATION  Nursing Mobility Recommendation : 1 Assist  PT Device Recommendation: Gait belt  PT Treatment Plan: Bed mobility, Body mechanics, Endurance, Energy conservation, Patient education, Gait training, Transfer training, Balance training  Rehab Potential : Fair  Frequency (Obs): 3-5x/week     PHYSICAL THERAPY MEDICAL/SOCIAL HISTORY     Problem List  Principal Problem:    Acute gastrointestinal  hemorrhage    HOME SITUATION  Type of Home:  ([facility])  Home Layout: One level, Elevator  Patient Regularly Uses: Glasses     SUBJECTIVE  \"Where is the person with my food?\"    PHYSICAL THERAPY EXAMINATION   OBJECTIVE  Precautions: Bed/chair alarm, Restraints  Fall Risk: High fall risk    PAIN ASSESSMENT  Ratin    COGNITION  Overall Cognitive Status:  Impaired  Attention Span:  difficulty attending to directions  Following Commands:  follows commands, however, very self-directed  Safety Judgement:  decreased awareness of need for assistance    RANGE OF MOTION AND STRENGTH ASSESSMENT  Upper extremity ROM and strength are within functional limits   Lower extremity ROM is within functional limits   Lower extremity strength is within functional limits     BALANCE  Static Sitting: Fair  Dynamic Sitting: Fair -  Static Standing: Fair -  Dynamic Standing: Not tested    ACTIVITY TOLERANCE  Pulse: 105 (with activity)  Heart Rate Source: Monitor    O2 WALK  Oxygen Therapy  SPO2% on Room Air at Rest: 96    AM-PAC '6-Clicks' INPATIENT SHORT FORM - BASIC MOBILITY  How much difficulty does the patient currently have...  Patient Difficulty: Turning over in bed (including adjusting bedclothes, sheets and blankets)?: A Little   Patient Difficulty: Sitting down on and standing up from a chair with arms (e.g., wheelchair, bedside commode, etc.): A Little   Patient Difficulty: Moving from lying on back to sitting on the side of the bed?: A Little   How much help from another person does the patient currently need...   Help from Another: Moving to and from a bed to a chair (including a wheelchair)?: A Little   Help from Another: Need to walk in hospital room?: A Little   Help from Another: Climbing 3-5 steps with a railing?: A Little     AM-PAC Score:  Raw Score: 18   Approx Degree of Impairment: 46.58%   Standardized Score (AM-PAC Scale): 43.63   CMS Modifier (G-Code): CK    FUNCTIONAL ABILITY STATUS  Functional Mobility/Gait  Assessment  Gait Assistance: Not tested  Rolling: contact guard assist  Supine to Sit: contact guard assist  Sit to Supine: contact guard assist  Sit to Stand: contact guard assist    Exercise/Education Provided:  Bed mobility  Body mechanics  Functional activity tolerated  Posture  Transfer training    Skilled Therapy Provided: Patient in bed with bilateral wrist restraints in place upon arrival. RN approved activity. Educated patient on POC and benefits of mobilization. Agreeable to participate. Patient reporting no pain. Patient perseverating on when he will receive his food tray throughout session, unable to redirect. Patient uncooperative and argumentative throughout session, frequently insulting therapists. Patient declining ambulation trial this session, returning self back to bed. Anticipate that patient is likely near baseline levels and would be able to ambulate with assistance once more cooperative.     The patient's Approx Degree of Impairment: 46.58% has been calculated based on documentation in the [facility] '6 clicks' Inpatient Basic Mobility Short Form.  Research supports that patients with this level of impairment may benefit from HHPT.  Final disposition will be made by interdisciplinary medical team.    Patient End of Session: In bed, Needs met, Call light within reach, RN aware of session/findings, All patient questions and concerns addressed, Hospital anti-slip socks, Alarm set, Restraints    CURRENT GOALS  Goals to be met by: 5/30/25  Patient Goal Patient's self-stated goal is: none   Goal #1 Patient is able to demonstrate supine - sit EOB @ level: supervision     Goal #1   Current Status    Goal #2 Patient is able to demonstrate transfers Sit to/from Stand at assistance level: supervision with none     Goal #2  Current Status    Goal #3 Patient is able to ambulate 200 feet with assist device: none at assistance level: supervision   Goal #3   Current Status    Goal #4 Patient to demonstrate  independence with home activity/exercise instructions provided to patient in preparation for discharge.   Goal #4   Current Status      Patient Evaluation Complexity Level:  History Moderate - 1 or 2 personal factors and/or co-morbidities   Examination of body systems Low -  addressing 1-2 elements   Clinical Presentation Low- Stable   Clinical Decision Making  Low Complexity     Therapeutic Activity:  12 minutes   Patient

## 2025-05-23 NOTE — OCCUPATIONAL THERAPY NOTE
OCCUPATIONAL THERAPY EVALUATION - INPATIENT     Room Number: 222/222-A  Evaluation Date: 2025  Type of Evaluation: Initial  Presenting Problem: acute GI hemorrhage    Physician Order: IP Consult to Occupational Therapy  Reason for Therapy: ADL/IADL Dysfunction and Discharge Planning    OCCUPATIONAL THERAPY ASSESSMENT   Patient is a 79 year old male admitted 2025 for rectal bleed.  Prior to admission, patient's baseline is unclear- pt reports he is I with all mobility and tasks without AD.  Patient is currently functioning near baseline with BADLs and functional mobility.  Patient is requiring contact guard assist and minimal assist as a result of the following impairments: decreased endurance, cognitive deficits ( ), and medical status. Occupational Therapy will continue to follow for duration of hospitalization.    Patient will benefit from continued skilled OT Services  and anticipate a return home with additional initial support.     PLAN DURING HOSPITALIZATION     OT Treatment Plan: Balance activities, ADL training, Functional transfer training, Endurance training, Cognitive reorientation     OCCUPATIONAL THERAPY MEDICAL/SOCIAL HISTORY   Problem List  Principal Problem:    Acute gastrointestinal hemorrhage    HOME SITUATION  Type of Home: -- ([facility])  Home Layout: One level; Elevator  Patient Regularly Uses: Glasses    Stairs in Home: 0  Use of Assistive Device(s): per pt report- none    Prior Level of [place]: Pt resides at the [facility]. Per pt report he is I without AD, and \"walks everywhere\".     SUBJECTIVE  \"    OCCUPATIONAL THERAPY EXAMINATION      OBJECTIVE  Fall Risk: High fall risk      PAIN ASSESSMENT  Ratin      COGNITION  Pt remains awake  Demo the ability to follow commands though limited cooperation  Pt demo limited insight into situation    Communication: able to express his needs    Behavioral/Emotional/Social: disagreeable, unpleasant, insulting therapists with limited  cooperation during session    RANGE OF MOTION   Upper extremity ROM is within functional limits     STRENGTH ASSESSMENT  Upper extremity strength is within functional limits     ACTIVITIES OF DAILY LIVING ASSESSMENT  AM-PAC ‘6-Clicks’ Inpatient Daily Activity Short Form  How much help from another person does the patient currently need…  -   Putting on and taking off regular lower body clothing?: A Lot  -   Bathing (including washing, rinsing, drying)?: A Lot  -   Toileting, which includes using toilet, bedpan or urinal? : A Lot  -   Putting on and taking off regular upper body clothing?: A Lot  -   Taking care of personal grooming such as brushing teeth?: A Little  -   Eating meals?: A Little    AM-PAC Score:  Score: 14  Approx Degree of Impairment: 59.67%  Standardized Score (AM-PAC Scale): 33.39  CMS Modifier (G-Code): CK    FUNCTIONAL TRANSFER ASSESSMENT  Sit to Stand: Edge of Bed  Edge of Bed: Contact Guard Assist    BED MOBILITY  Supine to Sit : Contact Guard Assist  Sit to Supine (OT): Contact Guard Assist    FUNCTIONAL ADL ASSESSMENT  Feeding:I  Grooming: set up from bed level for basic  OFH  UE self care: set up from sitting  LE self care: Anticipate set up with CGA for stand portions    Skilled Therapy Provided: Pt received in bed with SABINE soft wrist restraints in place. Pt remains unpleasant with limited cooperation during session. Pt perseverates on the location of his food tray, difficult to redirect.   Pt observed to move all extremities well manages bed mobility and sit to stand from EOB with CGA.   Pt returned to supine in bed with SABINE wrist restraints in place at completion of session. Nurse aware.     EDUCATION PROVIDED  Patient Education : Role of Occupational Therapy  Patient's Response to Education: Does Not Demonstrate Skills Needed for Learning; Demonstrates Disinterest    The patient's Approx Degree of Impairment: 59.67% has been calculated based on documentation in the [facility] '6 clicks'  Inpatient Daily Activity Short Form.  Research supports that patients with this level of impairment may benefit from NANCY however anticipate pt may be near his baseline and anticipate improved performance once pt is able to improved cooperation.   Final disposition will be made by interdisciplinary medical team.     Patient End of Session: In bed, Needs met, Call light within reach, RN aware of session/findings, Alarm set, Restraints    OT Goals  Patients self stated goal is: did not state     Patient will complete functional transfer with supervision  Comment:     Patient will complete toileting with supervision   Comment:     Patient will tolerate standing for 5-7 minutes in prep for adls with supervision   Comment:    Patient will complete item retrieval with supervision  Comment:          Goals  on: 6/3/25  Frequency: 3-5x/week    Patient Evaluation Complexity Level:   Occupational Profile/Medical History LOW - Brief history including review of medical or therapy records    Specific performance deficits impacting engagement in ADL/IADL LOW  1 - 3 performance deficits    Client Assessment/Performance Deficits LOW - No comorbidities nor modifications of tasks    Clinical Decision Making LOW - Analysis of occupational profile, problem-focused assessments, limited treatment options    Overall Complexity LOW     Self-Care Home Management: 10 minutes

## 2025-05-23 NOTE — OPERATIVE REPORT
EGD/Colonoscopy Operative Report    Aldo Valdez Patient Status:  Emergency    1945 MRN [id number]   Location [facility] ENDOSCOPY LAB SUITES Attending No att. providers found   Hosp Day #   0 PCP Wyatt Ross DO     Pre-Operative Diagnosis: acute gi bleed     Post-Operative Diagnosis:   Scattered pandiverticulosis  Old blood noted throughout the entire colon.  Multiple washings performed.  No active bleeding was identified throughout the entire procedure despite prolonged procedure and multiple washings.  Large pedunculated polyp 3 cm in the rectosigmoid junction at about 20cms from the anal verge.  This was erythematous.  It was not actively bleeding at the time of the scope however I cannot rule out this being the source of recent bleeding in the setting of anticoagulation especially with the CT angiogram showing positive bleed in this same region.  Because no other active bleeding was noted throughout the entire procedure the decision was made to remove this large polyp with hot cautery snare at the base of the polyp.  The post polypectomy site was closed with 3 clips.  There was evidence of old blood in the terminal ileum as well  Grade II internal and external hemorrhoids noted    EGD;  Normal esophagus and Z-line.  2 cm sliding hiatal hernia.  No esophageal varices.  Normal gastric examination.  No blood or bleeding source identified.  Retroflexed view was unremarkable.  Normal duodenum to the third portion    Procedure Performed:   EGD   COLONOSCOPY with hot cautery snare and clips.   Informed Consent: Informed consent for both the procedure and sedation were obtained from the patient. The potentially life-threatening complications of sedation, bleeding,  perforation, transfusion or repeat endoscopy were reviewed along with the possible need for  hospitalization, surgical management, transfusion or repeat endoscopy should one of these complications arise. The patient understands and is agreeable to proceed.  Sedation Type: MAC-Patient received sedation with monitored anesthesia provided by an anesthesiologist    Moderate Sedation Time: None.  Deep sedation provided by anesthesia.    Cecum Withdrawal Time:  30 min    Date of previous colonoscopy: unknown    Procedure Description: The patient was placed in the left lateral decubitus position. A bite block was placed in the patient’s mouth. The endoscope was inserted through the mouth and advanced under direct visualization to the 3rd portion of the duodenum and was then withdrawn to examine the duodenal bulb and gastric antrum.  The endoscope was then retroflexed to examine the angulus, GE junction, cardia, body and fundus,  then withdrawn to examine the esophagus. The endoscope was then removed from the patient. The patient tolerated the procedure well with no immediate complications. The patient was then repositioned for colonoscopy.  After careful digital rectal examination, the Adult colonoscope was inserted into the rectum and advanced to the level of the cecum under direct visualization. The cecum was identified by landmarks, including the appendiceal orifice and ileoceccal valve. Careful examination of the entire colon was performed during withdrawal of the endoscope. The scope was withdrawn to the rectum and retroflexion was performed.  The patient tolerated the procedure well with no immediate complications. The patient was transferred to the recovery area in stable condition.   Quality of Preparation: Adequate  Aronchick Bowel Prep Scale:  Fair - 3    Recommendations:   Monitor H&H and transfuse as necessary  Hold anticoagulation  If evidence of further bleeding, then suspicion would be diverticular bleed and would consider IR evaluation with angiogram.    NPO for now  Discharge:  The patient was  given an after visit summary detailing the procedure, findings, recommendations and follow up plans.  Cesar Byrne MD  5/22/2025  8:31 PM

## 2025-05-23 NOTE — PROGRESS NOTES
[facility]  Gastroenterology Progress Note    Aldo Valdez Patient Status:  Inpatient    1945 MRN [id number]   Location [facility] 2W/SW Attending Noe Liz DO   Hosp Day # 1 PCP Wyatt Ross DO     Subjective:  Aldo Valdez is a(n) 79 year old male.    Current complaints:   No further signs of bleeding.  No abdominal pain.  Hemodynamically stable.    Objective:  Blood pressure 144/73, pulse 80, temperature 97.1 °F (36.2 °C), temperature source Temporal, resp. rate 17, height 5' 9\" (1.753 m), weight 157 lb 6.5 oz (71.4 kg), SpO2 100%.  Respiratory: no labored breathing  CV: RRR  Abdomen: nondistended, soft, nontender  Extremities: no calf tenderness  Neurologic: O X 2, confused     Labs:   Recent Labs   Lab 25  1424 25  1643 25  1849 25  2343 25  0527 25  1159   WBC 4.6  --   --   --  4.8  --    HGB 7.3*   < > 7.4* 8.4* 8.6* 9.0*   HCT 23.6*  --  23.6*  --  26.8*  --    .0  --   --   --  257.0  --    CREATSERUM 2.05*  --   --   --  1.72*  --    BUN 40*  --   --   --  43*  --      --   --   --  142  --    K 5.1  --   --   --  4.3  --      --   --   --  119*  --    CO2 14.0*  --   --   --  16.0*  --    *  --   --   --  90  --    CA 8.9  --   --   --  8.0*  --    ALB 3.7  --   --   --  2.9*  --    ALKPHO 54  --   --   --  41*  --    BILT 0.7  --   --   --  0.5  --    TP 7.3  --   --   --  5.8  --    AST 15  --   --   --  12  --    ALT 14  --   --   --  9*  --    PTT 48.8*  --   --   --   --   --    INR 2.17*  --   --   --  1.47*  --    PTP 25.3*  --   --   --  18.6*  --     < > = values in this interval not displayed.       No results for input(s): \"YOHANA\", \"LIP\", \"GGT\", \"PSA\", \"DDIMER\", \"ESRML\", \"ESRPF\", \"CRP\", \"BNP\", \"TROP\", \"CK\", \"CKMB\", \"JAKOB\", \"RPR\", \"B12\", \"ETOH\", \"POCGLU\" in the last 168 hours.    Invalid input(s): \"RF\"     Imaging:  No results found.     Problem list:  Problem List[1]    Assessment/Plan:    Acute GI  bleeding  Acute blood loss anemia.   Rectal bleeding  - Status post EGD colonoscopy yesterday.  EGD negative.   Colonoscopy with diverticulosis and large pedunculated polyp in the rectosigmoid region that was fully removed with hot cautery snare.  The recent bleeding was felt to be more likely diverticular without culprit lesion identified.  Cannot completely rule out the large polyp contributing in the setting of anticoagulation however this was felt to be less likely.  - No further bleeding has been noted since endoscopy.      Recs:  Monitor H&H and transfuse as necessary  Follow-up pathology results  Repeat colonoscopy within the next year given the colon polyps.    Clear liquid diet today  Advance diet tomorrow to soft if no signs of bleeding.    Would hold anticoagulation for 5 days if safe to do so    Cesar Byrne MD    5/23/2025  4:24 PM         [1]   Patient Active Problem List  Diagnosis    Essential hypertension    Back pain    Pure hypercholesterolemia    Cerebral infarction involving posterior cerebral artery, right (HCC)    Anxiety    Acute gastrointestinal hemorrhage

## 2025-05-24 NOTE — PROGRESS NOTES
[facility]  Gastroenterology Progress Note    Aldo Valdez Patient Status:  Inpatient    1945 MRN [id number]   Location [facility] 2W/SW Attending Noe Liz DO   Hosp Day # 2 PCP Wyatt Ross DO     Subjective:  Aldo Valdez is a(n) 79 year old male.    Current complaints:   Patient had a CODE BLUE earlier this morning.     No signs of bleeding.        Objective:  Blood pressure (!) 145/96, pulse 94, temperature 98.2 °F (36.8 °C), temperature source Temporal, resp. rate 23, height 5' 9\" (1.753 m), weight 157 lb 6.5 oz (71.4 kg), SpO2 100%.  Respiratory: Decreased breath sounds  CV: Irregular  Abdomen: nondistended, soft, nontender  Extremities: no calf tenderness  Intubated and sedated    Labs:   Recent Labs   Lab 25  1424 25  1643 25  1849 25  2343 25  0527 25  1159 25  1852 25  0020 25  0236   WBC 4.6  --   --   --  4.8  --   --   --   --    HGB 7.3*   < > 7.4*   < > 8.6*   < > 8.4* 9.8* 8.4*   HCT 23.6*  --  23.6*  --  26.8*  --   --   --   --    .0  --   --   --  257.0  --   --   --   --    CREATSERUM 2.05*  --   --   --  1.72*  --   --   --  1.59*  1.59*   BUN 40*  --   --   --  43*  --   --   --  32*  32*     --   --   --  142  --   --   --  143  143   K 5.1  --   --   --  4.3  --   --   --  3.5  3.5     --   --   --  119*  --   --   --  117*  117*   CO2 14.0*  --   --   --  16.0*  --   --   --  15.0*  15.0*   *  --   --   --  90  --   --   --  226*  226*   CA 8.9  --   --   --  8.0*  --   --   --  6.7*  6.7*   ALB 3.7  --   --   --  2.9*  --   --   --  2.4*   ALKPHO 54  --   --   --  41*  --   --   --  35*   BILT 0.7  --   --   --  0.5  --   --   --  0.5   TP 7.3  --   --   --  5.8  --   --   --  5.0*   AST 15  --   --   --  12  --   --   --  39*   ALT 14  --   --   --  9*  --   --   --  23   PTT 48.8*  --   --   --   --   --   --   --   --    INR 2.17*  --   --   --  1.47*  --   --   --   --     PTP 25.3*  --   --   --  18.6*  --   --   --   --     < > = values in this interval not displayed.       No results for input(s): \"YOHANA\", \"LIP\", \"GGT\", \"PSA\", \"DDIMER\", \"ESRML\", \"ESRPF\", \"CRP\", \"BNP\", \"TROP\", \"CK\", \"CKMB\", \"JAKOB\", \"RPR\", \"B12\", \"ETOH\", \"POCGLU\" in the last 168 hours.    Invalid input(s): \"RF\"     Imaging:  XR CHEST AP PORTABLE  (CPT=71045)  Result Date: 5/24/2025  CONCLUSION:  1. Endotracheal and enteric tubes are in customary positioning. 2. Development of mild retrocardiac opacity, which could relate to left basilar consolidation or atelectasis. 3. Stable cardiomegaly.   A preliminary report was issued by the [facility] teleradiology service. There are no major discrepancies.  [facility]  Dictated by (CST): Avtar Yo MD on 5/24/2025 at 8:10 AM     Finalized by (CST): Avtar Yo MD on 5/24/2025 at 8:14 AM             Problem list:  Problem List[1]    Assessment/Plan:    Acute GI bleeding  Acute blood loss anemia.   Rectal bleeding  - Status post EGD colonoscopy Thursday evening.  EGD negative.   -Colonoscopy with diverticulosis and large pedunculated polyp in the rectosigmoid region that was fully removed with hot cautery snare.  The recent bleeding was felt to be more likely diverticular without culprit lesion identified.  Cannot completely rule out the large polyp contributing in the setting of anticoagulation however this was felt to be less likely.  - No further bleeding has been noted since endoscopy.    4.   Cardiac arrest overnight  - Etiology unclear.  No signs of further bleeding.        Recs:  Monitor H&H and transfuse as necessary  Continue supportive care  3.   No further GI intervention at this time  4.   Follow-up pulmonary and critical care recommendations    Cesar Byrne MD           [1]   Patient Active Problem List  Diagnosis    Essential hypertension    Back pain    Pure hypercholesterolemia    Cerebral infarction involving posterior cerebral artery,  right (HCC)    Anxiety    Acute gastrointestinal hemorrhage

## 2025-05-24 NOTE — RESPIRATORY THERAPY NOTE
Patient intubated during Code Blue with 7.5 ETT secured at 27 cm at the lip. Current vent settings-     05/24/25 0507   Vent Information   Vent Mode VC/AC   Settings   FiO2 (%) 60 %   Resp Rate (Set) 14   Vt (Set, mL) 500 mL   Waveform Decelerating ramp   PEEP/CPAP (cm H2O) 5 cm H20     Suction provided as needed. Post-intubation ABG below. RT will continue to monitor.      Latest Reference Range & Units 05/24/25 01:23   ABG PH 7.35 - 7.45  7.14 (LL)   ABG PCO2 35 - 45 mm Hg 35   ABG PO2 80 - 100 mm Hg 400 (H)   ABG HCO3 21.0 - 27.0 mEq/L 12.4 (L)   ABG O2 SATURATION 94.0 - 100.0 % 99.0   Blood Gas Base Excess -2.0 - 2.0 mmol/L -16.0 (L)   Lactic Acid (Blood Gas) 0.5 - 2.0 mmol/L 7.0 (HH)

## 2025-05-24 NOTE — PROGRESS NOTES
Pt's POA, Juanito, called with no answer. In Voicemail this nurse mentioned the situation and that he's currently stable.   0314: call back from Juanito and updated. Consent given by Juanito to notify Pt's brother, Gil.   Gil called, no answer, voicemail left. Awaiting call back.

## 2025-05-24 NOTE — PROGRESS NOTES
On call Brandturnist 05/24/25  Code blue called patient reportedly had increased secretions and tongue began swelling then became bradycardic and coded. ACLS/CPR initiated.  See code sheet.  Airway very edematous with angioedema glidescope used to intubate.  Patient regained pulse narrow complext rapid afib noted.   -treat for allergic reaction benadryl and solumedrol ordered  -stat cxr  -stat labs    -if remains tachycardic consider starting cardizem for rapid afib      Rapid sequence intubation:   Indication for the procedure was cardiac arrest.  The patient was preoxygenated with 100% oxygen by face mask .  The patient was orally endotracheally intubated under direct visualization with a 7.5 ETT.  Very edematous airway.   There was good misting on the tube; breath sounds were auscultated equally bilaterally; good color change with [place] CO2 detector.  Chest X-ray shows ETT in good position.  The procedure was performed by myself.    I spent a total of 35 minutes of critical care time in obtaining history, performing a physical exam, bedside monitoring of interventions, collecting and interpreting tests and discussion with consultants but not including time spent performing procedures.

## 2025-05-24 NOTE — CONSULTS
[facility]  part of [facility]    Consult Note    Date:  5/24/2025  Date of Admission:  5/22/2025    Chief Complaint:   Aldo Valdez is a(n) 79 year old male with cardiopulmonary arrest.    HPI:   The patient has histories of atrial fibrillation on Eliquis, hypertension, diabetes, and organic brain syndrome.  He has a guardian as well as a brother.  He was evaluated in the emergency room and was found to have a hemoglobin of 6.4 associated with rectal bleeding.  He received packed red blood cells and correction of anticoagulation.  He was taken to the endoscopy unit and underwent EGD and lower extremity scope and was found to have diverticular disease as well as a colon polyp.  Yesterday evening in the middle of the night he had an asystolic arrest associated with marked swelling of his throat.  The patient received emergent intubation.  He became bradycardia arrhythmic and asystolic and had 8 minutes of CPR.  He is wakeful on ventilator at this juncture although not following commands on ventilator.  His eyes are open and he is tracking.  He also received Benadryl and steroids.  He is currently on lisinopril.    History   Past Medical History[1]  Past Surgical History[2]  Family History[3]  Social History:  Short Social Hx on File[4]  Allergies/Medications:   Allergies: Allergies[5]  Prescriptions Prior to Admission[6]    Review of Systems:   Cannot obtain at this moment    Physical Exam:   Vital Signs:  Blood pressure (!) 149/93, pulse 97, temperature 98.2 °F (36.8 °C), temperature source Temporal, resp. rate 22, height 5' 9\" (1.753 m), weight 157 lb 6.5 oz (71.4 kg), SpO2 100%.     Alert male on ventilator with eyes open but not clearly following command  HEENT examination is unremarkable with pupils equal round and reactive to light and accommodation.   Neck without adenopathy, thyromegaly, JVD nor bruit.   Lungs clear to auscultation and percussion.  Cardiac regular rate and rhythm no  murmur.   Abdomen nontender, without hepatosplenomegaly and no mass appreciable.   Extremities without clubbing cyanosis nor edema.   Neurologic eyes open but not following commands although he does have some spontaneous movement of the extremities.  Skin without gross abnormality    Results:     Lab Results   Component Value Date    HGB 8.4 05/24/2025    CREATSERUM 1.59 05/24/2025    CREATSERUM 1.59 05/24/2025    BUN 32 05/24/2025    BUN 32 05/24/2025     05/24/2025     05/24/2025    K 3.5 05/24/2025    K 3.5 05/24/2025     05/24/2025     05/24/2025    CO2 15.0 05/24/2025    CO2 15.0 05/24/2025     05/24/2025     05/24/2025    CA 6.7 05/24/2025    CA 6.7 05/24/2025    ALB 2.4 05/24/2025    ALKPHO 35 05/24/2025    BILT 0.5 05/24/2025    TP 5.0 05/24/2025    AST 39 05/24/2025    ALT 23 05/24/2025   Chest x-ray-mild basilar atelectasis with tubes in good position    EKG-atrial flutter with variable AV block.  Compared to EKG 2 days ago, the a flutter has replaced the A-fib.  There are nonspecific EKG changes otherwise    Assessment/Plan:   1.  Cardiopulmonary arrest-the exact etiology is uncertain; however, I have concerns that the patient developed angioedema resulting in respiratory impairment contributing to subsequent bradycardia arrhythmia and asystolic arrest.  The patient was successfully resuscitated and intubated and now is stabilizing on ventilator.  The patient had been on lisinopril.    Recommendations:  1.  Solu-Medrol  2.  Benadryl  3.  Pepcid  4.  Full ventilator support today  5.  Will follow clinically.  6.  Will hold lisinopril    2.  GI bleeding-no evidence that it is active at present.  Possibly diverticular.  Colon polyp removed.    Recommendations:  1.  Pepcid  2.  Serial hemoglobin  3.  As per GI  4.  Await pathology    3.  DVT prophylaxis-SCDs    4.  CODE STATUS-full    5.  Atrial fibrillation\flutter-was on Eliquis now held.  Rate acceptable.    6.  OBS-we  will continue with neurological examinations in the short-term    I am delighted to assist with this patient's care.    Ishmael Fonseca MD  Medical Director, Critical Care, [facility]  Medical Director, [facility]  Pager: [phone]               [1]   Past Medical History:   A-fib (HCC)    CVA (cerebral vascular accident) (HCC)    Diabetes (HCC)   [2]   Past Surgical History:  Procedure Laterality Date    Colonoscopy N/A 5/22/2025    Procedure: COLONOSCOPY;  Surgeon: Cesar Byrne MD;  Location: [facility] ENDOSCOPY   [3] No family history on file.  [4]   Social History  Socioeconomic History    Marital status:    Tobacco Use    Smoking status: Former     Types: Cigarettes     Passive exposure: Never    Smokeless tobacco: Never   Vaping Use    Vaping status: Never Used   Substance and Sexual Activity    Alcohol use: Yes     Alcohol/week: 6.0 - 7.0 standard drinks of alcohol     Types: 3 - 4 Standard drinks or equivalent, 3 Cans of beer per week     Social Drivers of Health     Food Insecurity: Low Risk  (3/11/2022)    Received from [facility]    Food Insecurity     Have there been times that your food ran out, and you didn't have money to get more?: No     Are there times that you worry that this might happen?: No   Transportation Needs: Low Risk  (3/11/2022)    Received from [facility]    Transportation Needs     Do you have trouble getting transportation to medical appointments?: No   Housing Stability: Low Risk  (3/11/2022)    Received from [facility]    Housing Stability     Are you concerned about having a safe and reliable place to live?: No   [5] No Known Allergies  [6]   Medications Prior to Admission   Medication Sig    apixaban 5 MG Oral Tab Take 1 tablet (5 mg total) by mouth 2 (two) times daily.    diazePAM 2 MG Oral Tab Take 1 tablet (2 mg total) by mouth daily.    diazePAM 5 MG Oral Tab Take 1 tablet (5 mg total) by  mouth at bedtime.    dilTIAZem 90 MG Oral Tab Take 2 tablets (180 mg total) by mouth 2 (two) times daily. HOLD if blood pressure <100 and heart rate <60    lisinopril 30 MG Oral Tab Take 1 tablet (30 mg total) by mouth before bedtime.    lisinopril 40 MG Oral Tab Take 1 tablet (40 mg total) by mouth in the morning.    metFORMIN 500 MG Oral Tab Take 1 tablet (500 mg total) by mouth 2 (two) times daily with meals.    metoprolol succinate ER 50 MG Oral Tablet 24 Hr Take 1 tablet (50 mg total) by mouth in the morning.    ergocalciferol 1.25 MG (08075 UT) Oral Cap Take 1 capsule (50,000 Units total) by mouth once a week. Taking dose on Fridays

## 2025-05-24 NOTE — RESPIRATORY THERAPY NOTE
Patient's Problems:   Acute gastrointestinal Hemorrhage.      Respiratory Assessment :   Intubated patient is on ventilator/ full support.   Breath Sounds: were clear/ diminish  bilateral.   Secretions: Small  white and thin secretions.   Patient was suctioned as needed.       Ventilator Settings: AC/VC  RR 14 ,  , PEEP + 5, FIO2 40%      Airway: ETT 7.5  at 27 Lip    Day of intubation: 5/24/25       Plan of Care:  Received patient on ventilator. Patient was intubated very early this morning after Code Blue called. Patient was became bradycardic and coded. ACLS/CPR initiated then patient regained pulse. Not SBT today, patient does not met weaning criteria at this time.  Patient FIO2 was decreased from 60% to 40%. FIO2 was decreased to 40%  after ABG results were checked, PO2 was 267 mmHg on 60%.  Patient needs to continue on ventilator. Patient's respiratory status is stable. Patient is tolerating ventilator setting  well. Patient was suction as need it, RT to continue to monitor this patient.

## 2025-05-24 NOTE — PLAN OF CARE
Pt on clear liquid diet now. IVF discontinued.   Problem: Diabetes/Glucose Control  Goal: Glucose maintained within prescribed range  Description: INTERVENTIONS:  - Monitor Blood Glucose as ordered  - Assess for signs and symptoms of hyperglycemia and hypoglycemia  - Administer ordered medications to maintain glucose within target range  - Assess barriers to adequate nutritional intake and initiate nutrition consult as needed  - Instruct patient on self management of diabetes  Outcome: Progressing     Problem: Safety Risk - Non-Violent Restraints  Goal: Patient will remain free from self-harm  Description: INTERVENTIONS:  - Apply the least restrictive restraint to prevent harm  - Notify patient and family of reasons restraints applied  - Assess for any contributing factors to confusion (electrolyte disturbances, delirium, medications)  - Discontinue any unnecessary medical devices as soon as possible  - Assess the patient's physical comfort, circulation, skin condition, hydration, nutrition and elimination needs   - Reorient and redirection as needed  - Assess for the need to continue restraints  Outcome: Progressing     Problem: CARDIOVASCULAR - ADULT  Goal: Maintains optimal cardiac output and hemodynamic stability  Description: INTERVENTIONS:  - Monitor vital signs, rhythm, and trends  - Monitor for bleeding, hypotension and signs of decreased cardiac output  - Evaluate effectiveness of vasoactive medications to optimize hemodynamic stability  - Monitor arterial and/or venous puncture sites for bleeding and/or hematoma  - Assess quality of pulses, skin color and temperature  - Assess for signs of decreased coronary artery perfusion - ex. Angina  - Evaluate fluid balance, assess for edema, trend weights  Outcome: Progressing  Goal: Absence of cardiac arrhythmias or at baseline  Description: INTERVENTIONS:  - Continuous cardiac monitoring, monitor vital signs, obtain 12 lead EKG if indicated  - Evaluate  effectiveness of antiarrhythmic and heart rate control medications as ordered  - Initiate emergency measures for life threatening arrhythmias  - Monitor electrolytes and administer replacement therapy as ordered  Outcome: Progressing     Problem: GASTROINTESTINAL - ADULT  Goal: Minimal or absence of nausea and vomiting  Description: INTERVENTIONS:  - Maintain adequate hydration with IV or PO as ordered and tolerated  - Nasogastric tube to low intermittent suction as ordered  - Evaluate effectiveness of ordered antiemetic medications  - Provide nonpharmacologic comfort measures as appropriate  - Advance diet as tolerated, if ordered  - Obtain nutritional consult as needed  - Evaluate fluid balance  Outcome: Progressing  Goal: Maintains or returns to baseline bowel function  Description: INTERVENTIONS:  - Assess bowel function  - Maintain adequate hydration with IV or PO as ordered and tolerated  - Evaluate effectiveness of GI medications  - Encourage mobilization and activity  - Obtain nutritional consult as needed  - Establish a toileting routine/schedule  - Consider collaborating with pharmacy to review patient's medication profile  Outcome: Progressing     Problem: HEMATOLOGIC - ADULT  Goal: Maintains hematologic stability  Description: INTERVENTIONS  - Assess for signs and symptoms of bleeding or hemorrhage  - Monitor labs and vital signs for trends  - Administer supportive blood products/factors, fluids and medications as ordered and appropriate  - Administer supportive blood products/factors as ordered and appropriate  Outcome: Progressing  Goal: Free from bleeding injury  Description: (Example usage: patient with low platelets)  INTERVENTIONS:  - Avoid intramuscular injections, enemas and rectal medication administration  - Ensure safe mobilization of patient  - Hold pressure on venipuncture sites to achieve adequate hemostasis  - Assess for signs and symptoms of internal bleeding  - Monitor lab trends  -  Patient is to report abnormal signs of bleeding to staff  - Avoid use of toothpicks and dental floss  - Use electric shaver for shaving  - Use soft bristle tooth brush  - Limit straining and forceful nose blowing  Outcome: Progressing     Problem: Patient Centered Care  Goal: Patient preferences are identified and integrated in the patient's plan of care  Description: Interventions:  - What would you like us to know as we care for you?   - Provide timely, complete, and accurate information to patient/family  - Incorporate patient and family knowledge, values, beliefs, and cultural backgrounds into the planning and delivery of care  - Encourage patient/family to participate in care and decision-making at the level they choose  - Honor patient and family perspectives and choices  Outcome: Progressing     Problem: Patient/Family Goals  Goal: Patient/Family Long Term Goal  Description: Patient's Long Term Goal:     Interventions:  -   - See additional Care Plan goals for specific interventions  Outcome: Progressing  Goal: Patient/Family Short Term Goal  Description: Patient's Short Term Goal:     Interventions:   -   - See additional Care Plan goals for specific interventions  Outcome: Progressing

## 2025-05-24 NOTE — PLAN OF CARE
SAT done: patient able to follow commands. No SBT today. Safety measures maintained. Restraints assessed Q2.      Problem: Patient Centered Care  Goal: Patient preferences are identified and integrated in the patient's plan of care  Description: Interventions:  - What would you like us to know as we care for you? From [facility].    - Provide timely, complete, and accurate information to patient/family  - Incorporate patient and family knowledge, values, beliefs, and cultural backgrounds into the planning and delivery of care  - Encourage patient/family to participate in care and decision-making at the level they choose  - Honor patient and family perspectives and choices  Outcome: Progressing     Problem: HEMATOLOGIC - ADULT  Goal: Maintains hematologic stability  Description: INTERVENTIONS  - Assess for signs and symptoms of bleeding or hemorrhage  - Monitor labs and vital signs for trends  - Administer supportive blood products/factors, fluids and medications as ordered and appropriate  - Administer supportive blood products/factors as ordered and appropriate  Outcome: Progressing  Goal: Free from bleeding injury  Description: (Example usage: patient with low platelets)  INTERVENTIONS:  - Avoid intramuscular injections, enemas and rectal medication administration  - Ensure safe mobilization of patient  - Hold pressure on venipuncture sites to achieve adequate hemostasis  - Assess for signs and symptoms of internal bleeding  - Monitor lab trends  - Patient is to report abnormal signs of bleeding to staff  - Avoid use of toothpicks and dental floss  - Use electric shaver for shaving  - Use soft bristle tooth brush  - Limit straining and forceful nose blowing  Outcome: Progressing     Problem: GASTROINTESTINAL - ADULT  Goal: Minimal or absence of nausea and vomiting  Description: INTERVENTIONS:  - Maintain adequate hydration with IV or PO as ordered and tolerated  - Nasogastric tube to low intermittent  suction as ordered  - Evaluate effectiveness of ordered antiemetic medications  - Provide nonpharmacologic comfort measures as appropriate  - Advance diet as tolerated, if ordered  - Obtain nutritional consult as needed  - Evaluate fluid balance  Outcome: Progressing  Goal: Maintains or returns to baseline bowel function  Description: INTERVENTIONS:  - Assess bowel function  - Maintain adequate hydration with IV or PO as ordered and tolerated  - Evaluate effectiveness of GI medications  - Encourage mobilization and activity  - Obtain nutritional consult as needed  - Establish a toileting routine/schedule  - Consider collaborating with pharmacy to review patient's medication profile  Outcome: Progressing     Problem: CARDIOVASCULAR - ADULT  Goal: Maintains optimal cardiac output and hemodynamic stability  Description: INTERVENTIONS:  - Monitor vital signs, rhythm, and trends  - Monitor for bleeding, hypotension and signs of decreased cardiac output  - Evaluate effectiveness of vasoactive medications to optimize hemodynamic stability  - Monitor arterial and/or venous puncture sites for bleeding and/or hematoma  - Assess quality of pulses, skin color and temperature  - Assess for signs of decreased coronary artery perfusion - ex. Angina  - Evaluate fluid balance, assess for edema, trend weights  Outcome: Progressing  Goal: Absence of cardiac arrhythmias or at baseline  Description: INTERVENTIONS:  - Continuous cardiac monitoring, monitor vital signs, obtain 12 lead EKG if indicated  - Evaluate effectiveness of antiarrhythmic and heart rate control medications as ordered  - Initiate emergency measures for life threatening arrhythmias  - Monitor electrolytes and administer replacement therapy as ordered  Outcome: Progressing     Problem: Diabetes/Glucose Control  Goal: Glucose maintained within prescribed range  Description: INTERVENTIONS:  - Monitor Blood Glucose as ordered  - Assess for signs and symptoms of  hyperglycemia and hypoglycemia  - Administer ordered medications to maintain glucose within target range  - Assess barriers to adequate nutritional intake and initiate nutrition consult as needed  - Instruct patient on self management of diabetes  Outcome: Progressing

## 2025-05-24 NOTE — PROGRESS NOTES
[facility] Hospitalist Progress Note     CC: Hospital Follow up    PCP: Wyatt Ross DO       Assessment/Plan:   This is a 79-year-old male with history of dementia, stroke, atrial fibrillation on Eliquis, hypertension, type 2 diabetes, who presents to the hospital for evaluation of rectal bleeding.  S/p EGD/C-scope 5/23/25.      5/24/25 CODE BLUE was called for increased secretions with tongue swelling and bradycardia which led to asystolic arrest.  CPR was initiated and patient was intubated.     Angioedema  - Tongue swelling with increased secretions  - Requiring intubation 5/24/2025  - IV steroids and Benadryl started  - Full vent support  - Critical care on consult  - ABG reviewed  - Hold lisinopril going forward    Rectal bleeding   Hypotension, likely had component of hemorrhagic shock on admission   Acute blood loss anemia  High suspicion for diverticular hemorrhage  - 2 units of PRBC given stat in ER  - Kcentra infusion in ER  - CTA abdomen and pelvis with active extravasation in sigmoid   - GI consulted   - underwent colonoscopy urgently on admission to attempt to clip/stop bleed  - S/p EGD/C-scope 5/23/25 Colonoscopy with diverticulosis and large pedunculated polyp in the rectosigmoid region that was fully removed with hot cautery snare, bleeding was felt to be more likely diverticular without culprit lesion identified   - serial Hemoglobin monitoring  - PPI IV   - if further bleeding may need IR involvement      Atrial fibrillation  - hold eliquis  - takes diltiazem 180mg BID, and toprol 50mg daily   - dilt drip   - cards consulted   - Echo      Dementia  - resides at the [facility] [place]     Fluids: hold for now  Diet: NPO  DVT prophylaxis: hold chemical prophy     Edward Pa MD   [facility] Hospitalist        Subjective:     Intubated and sedated    OBJECTIVE:    Blood pressure (!) 154/95, pulse 92, temperature 98.2 °F (36.8 °C), temperature source Temporal, resp. rate  23, height 5' 9\" (1.753 m), weight 157 lb 6.5 oz (71.4 kg), SpO2 100%.    Temp:  [97.1 °F (36.2 °C)-98.2 °F (36.8 °C)] 98.2 °F (36.8 °C)  Pulse:  [] 92  Resp:  [13-25] 23  BP: ()/() 154/95  SpO2:  [91 %-100 %] 100 %  FiO2 (%):  [60 %-100 %] 60 %      Intake/Output:    Intake/Output Summary (Last 24 hours) at 5/24/2025 1044  Last data filed at 5/24/2025 0600  Gross per 24 hour   Intake 1209.75 ml   Output 201 ml   Net 1008.75 ml       Last 3 Weights   05/22/25 2238 157 lb 6.5 oz (71.4 kg)   05/22/25 1426 180 lb (81.6 kg)   10/08/24 1853 180 lb (81.6 kg)   12/18/19 0857 188 lb (85.3 kg)       BP (!) 154/95 (BP Location: Right arm)   Pulse 92   Temp 98.2 °F (36.8 °C) (Temporal)   Resp 23   Ht 5' 9\" (1.753 m)   Wt 157 lb 6.5 oz (71.4 kg)   SpO2 100%   BMI 23.25 kg/m²   General: Intubated and sedated  Lungs: Mechanical breath sounds  Heart: Irregularly irregular  Abdomen: soft, non tender  Extremities: No edema    Data Review:       Labs:     Recent Labs   Lab 05/22/25  1424 05/22/25  1643 05/22/25  1849 05/22/25  2343 05/23/25  0527 05/23/25  1159 05/24/25  0020 05/24/25  0236 05/24/25  0950   RBC 3.04*  --   --   --  3.26*  --   --   --  3.66*   HGB 7.3*   < > 7.4*   < > 8.6*   < > 9.8* 8.4* 9.5*   HCT 23.6*  --  23.6*  --  26.8*  --   --   --  29.2*   MCV 77.6*  --   --   --  82.2  --   --   --  79.8*   MCH 24.0*  --   --   --  26.4  --   --   --  26.0   MCHC 30.9*  --   --   --  32.1  --   --   --  32.5   RDW 16.1*  --   --   --  16.2*  --   --   --  16.8*   NEPRELIM 3.36  --   --   --  2.31  --   --   --  3.58   WBC 4.6  --   --   --  4.8  --   --   --  4.4   .0  --   --   --  257.0  --   --   --  287.0    < > = values in this interval not displayed.         Recent Labs   Lab 05/22/25  1424 05/23/25  0527 05/24/25  0236   * 90 226*  226*   BUN 40* 43* 32*  32*   CREATSERUM 2.05* 1.72* 1.59*  1.59*   EGFRCR 33* 40* 44*  44*   CA 8.9 8.0* 6.7*  6.7*    142 143  143    K 5.1 4.3 3.5  3.5    119* 117*  117*   CO2 14.0* 16.0* 15.0*  15.0*       Recent Labs   Lab 05/22/25  1424 05/23/25  0527 05/24/25  0236   ALT 14 9* 23   AST 15 12 39*   ALB 3.7 2.9* 2.4*         Imaging:  XR CHEST AP PORTABLE  (CPT=71045)  Result Date: 5/24/2025  CONCLUSION:  1. Endotracheal and enteric tubes are in customary positioning. 2. Development of mild retrocardiac opacity, which could relate to left basilar consolidation or atelectasis. 3. Stable cardiomegaly.   A preliminary report was issued by the [facility] teleradiology service. There are no major discrepancies.  [facility]  Dictated by (CST): Avtar Yo MD on 5/24/2025 at 8:10 AM     Finalized by (CST): Avtar Yo MD on 5/24/2025 at 8:14 AM              Meds:     Scheduled Medications[1]  Medication Infusions[2]  PRN Medications[3]                 [1]    atropine        dilTIAZem  30 mg Oral 4 times per day    methylPREDNISolone  40 mg Intravenous Q8H    diphenhydrAMINE  25 mg Intravenous Q8H    pantoprazole  40 mg Intravenous Daily    metoprolol tartrate  25 mg Oral 2x Daily(Beta Blocker)   [2]    propofol 10 mcg/kg/min (05/24/25 1015)    dilTIAZem 10 mg/hr (05/24/25 0655)    norepinephrine     [3]   atropine    dilTIAZem    acetaminophen    ondansetron    metoclopramide    glucose **OR** glucose **OR** glucose-vitamin C **OR** dextrose **OR** glucose **OR** glucose **OR** glucose-vitamin C

## 2025-05-24 NOTE — PLAN OF CARE
Pt. Responding to painful stimuli with gag and cough. HR maintaining below 120. Cardizem gtt going. SBP 120s to 110s. POA and Brother notified, will notify oncoming nurse. MD made aware of lab results. Bed in lowest position and bed in lowest position.   Problem: Diabetes/Glucose Control  Goal: Glucose maintained within prescribed range  Description: INTERVENTIONS:  - Monitor Blood Glucose as ordered  - Assess for signs and symptoms of hyperglycemia and hypoglycemia  - Administer ordered medications to maintain glucose within target range  - Assess barriers to adequate nutritional intake and initiate nutrition consult as needed  - Instruct patient on self management of diabetes  Outcome: Progressing     Problem: Safety Risk - Non-Violent Restraints  Goal: Patient will remain free from self-harm  Description: INTERVENTIONS:  - Apply the least restrictive restraint to prevent harm  - Notify patient and family of reasons restraints applied  - Assess for any contributing factors to confusion (electrolyte disturbances, delirium, medications)  - Discontinue any unnecessary medical devices as soon as possible  - Assess the patient's physical comfort, circulation, skin condition, hydration, nutrition and elimination needs   - Reorient and redirection as needed  - Assess for the need to continue restraints  Outcome: Progressing     Problem: CARDIOVASCULAR - ADULT  Goal: Maintains optimal cardiac output and hemodynamic stability  Description: INTERVENTIONS:  - Monitor vital signs, rhythm, and trends  - Monitor for bleeding, hypotension and signs of decreased cardiac output  - Evaluate effectiveness of vasoactive medications to optimize hemodynamic stability  - Monitor arterial and/or venous puncture sites for bleeding and/or hematoma  - Assess quality of pulses, skin color and temperature  - Assess for signs of decreased coronary artery perfusion - ex. Angina  - Evaluate fluid balance, assess for edema, trend  weights  Outcome: Progressing  Goal: Absence of cardiac arrhythmias or at baseline  Description: INTERVENTIONS:  - Continuous cardiac monitoring, monitor vital signs, obtain 12 lead EKG if indicated  - Evaluate effectiveness of antiarrhythmic and heart rate control medications as ordered  - Initiate emergency measures for life threatening arrhythmias  - Monitor electrolytes and administer replacement therapy as ordered  Outcome: Progressing     Problem: GASTROINTESTINAL - ADULT  Goal: Minimal or absence of nausea and vomiting  Description: INTERVENTIONS:  - Maintain adequate hydration with IV or PO as ordered and tolerated  - Nasogastric tube to low intermittent suction as ordered  - Evaluate effectiveness of ordered antiemetic medications  - Provide nonpharmacologic comfort measures as appropriate  - Advance diet as tolerated, if ordered  - Obtain nutritional consult as needed  - Evaluate fluid balance  Outcome: Progressing  Goal: Maintains or returns to baseline bowel function  Description: INTERVENTIONS:  - Assess bowel function  - Maintain adequate hydration with IV or PO as ordered and tolerated  - Evaluate effectiveness of GI medications  - Encourage mobilization and activity  - Obtain nutritional consult as needed  - Establish a toileting routine/schedule  - Consider collaborating with pharmacy to review patient's medication profile  Outcome: Progressing     Problem: HEMATOLOGIC - ADULT  Goal: Maintains hematologic stability  Description: INTERVENTIONS  - Assess for signs and symptoms of bleeding or hemorrhage  - Monitor labs and vital signs for trends  - Administer supportive blood products/factors, fluids and medications as ordered and appropriate  - Administer supportive blood products/factors as ordered and appropriate  Outcome: Progressing  Goal: Free from bleeding injury  Description: (Example usage: patient with low platelets)  INTERVENTIONS:  - Avoid intramuscular injections, enemas and rectal  medication administration  - Ensure safe mobilization of patient  - Hold pressure on venipuncture sites to achieve adequate hemostasis  - Assess for signs and symptoms of internal bleeding  - Monitor lab trends  - Patient is to report abnormal signs of bleeding to staff  - Avoid use of toothpicks and dental floss  - Use electric shaver for shaving  - Use soft bristle tooth brush  - Limit straining and forceful nose blowing  Outcome: Progressing     Problem: Patient Centered Care  Goal: Patient preferences are identified and integrated in the patient's plan of care  Description: Interventions:  - Provide timely, complete, and accurate information to patient/family  - Incorporate patient and family knowledge, values, beliefs, and cultural backgrounds into the planning and delivery of care  - Encourage patient/family to participate in care and decision-making at the level they choose  - Honor patient and family perspectives and choices  Outcome: Progressing     Problem: Patient/Family Goals  Goal: Patient/Family Long Term Goal      Interventions:    - See additional Care Plan goals for specific interventions  Outcome: Progressing  Goal: Patient/Family Short Term Goal      Interventions:   - See additional Care Plan goals for specific interventions  Outcome: Progressing

## 2025-05-24 NOTE — SIGNIFICANT EVENT
Code Blue    *See Code Blue Documentation Record*    Reason the code blue was called: Asystole  Assessment of patient leading up to code: Increased secretions, bradycardic, hypoxic  Interventions/Testing: Suction, CPR, medications  Patient Outcome/Disposition: intubated  Family Notified: no  Name of family notified:

## 2025-05-24 NOTE — CONSULTS
[facility] Cardiology  Consultation Note      Aldo Valdez Patient Status:  Inpatient    1945 MRN [id number]   Location [facility] 2W/SW Attending Edward Pa MD   Hosp Day # 2 PCP Wyatt Ross,      Impression:  1. witnessed bradycardic CV arrest (25), +throat swelling--> steroids/benadryl, holding lisinopril    2. pAF on apixaban--> GIB, Hg 6.4 s/p prbc transfusion--> AC held, EGD/C-scope 25: diverticulosis (likely cause), polyp s/p cautery/snare.    Recommendations:  - CV arrest: cause not entirely clear. angioedema/anaphlaxis, respiratory distress leading to bradycardic arrest is likely scenario.  Outpatient lisinopril (didn't get here), will discontinue indefinitely. ECG without ischemic changes.  Currently AF rate mostly controlled on oral diltiazem, monitor telemetry.    - TTE ordered.  - AC: holding anticoagulation for now; resume apixaban 5mg po bid prior to dc.  - vent management: per pulmonary/CC.  - steroids/benadryl    CC time 30 minutes.    History of Present Illness:  Aldo Valdez is a 79 year old male who presented to [facility] on 2025.    Seen in cardiology consultation for bradycardiac arrest. Hx of pAF on apixaban, admitted with transfusion dependent hematochezia; EGD/C-scope 25 demonstrated colonic diverticulosis (likely culprit) and polyps s/p snare cautery, Hg stable. For unclear reasons, he went into witnessed bradycardic arrest in setting of angioedema; resuscitated 8 minutes to resume ROSC--> AF RVR. Intubated, steroids/benadryl administered.  Stable since event, normotensive off pressors.    Medications:  Current Hospital Medications[1]    Past Medical History[2]    Past Surgical History[3]    Family History  family history is not on file.    Social History   reports that he has quit smoking. His smoking use included cigarettes. He has never been exposed to tobacco smoke. He has never used smokeless tobacco. He reports current  alcohol use of about 6.0 - 7.0 standard drinks of alcohol per week.     Allergies  Allergies[4]      Review of Systems:  Constitutional: negative for fevers  Eyes: negative for visual disturbance  Ears, nose, mouth, throat, and face: negative for epistaxis  Respiratory: negative for dyspnea on exertion  Cardiovascular: negative for chest pain  Gastrointestinal: negative for melena  Genitourinary:negative for hematuria  Hematologic/lymphatic: negative for bleeding  Musculoskeletal:negative for myalgias  Neurological: negative for dizziness and headaches  Endocrine: negative for temperature intolerance      Physical Exam:  Blood pressure (!) 149/93, pulse 97, temperature 98.2 °F (36.8 °C), temperature source Temporal, resp. rate 22, height 5' 9\" (1.753 m), weight 157 lb 6.5 oz (71.4 kg), SpO2 100%.  Temp (24hrs), Av.8 °F (36.6 °C), Min:97.1 °F (36.2 °C), Max:98.2 °F (36.8 °C)    Wt Readings from Last 3 Encounters:   25 157 lb 6.5 oz (71.4 kg)   10/08/24 180 lb (81.6 kg)   19 188 lb (85.3 kg)       General: intubated/sedated.  HEENT: Extraocular movements are intact; sclerae are anicteric; scalp is atrauamatic; no thyromegaly  Neck: Supple; no JVD; no carotid bruits  Cardiac: irregularly irregular; no murmurs/rubs/gallops are appreciated; PMI is non-displaced; there is no evidence of a sternal heave  Lungs: mechanical breath sounds; no accessory muscle use is noted  Abdomen: Soft, non-tender; bowel sounds are normoactive; no hepatosplenomegaly  Extremities: No clubbing or cyanosis; moves all 4 extremities normally  Psychiatric: Normal mood and affect; answers questions appropriately  Dermatologic: No rashes; normal skin turgor    Diagnostic testing:    EKG:AF    Labs:   Lab Results   Component Value Date    INR 1.47 (H) 2025    INR 2.17 (H) 2025        Lab Results   Component Value Date    WBC 4.4 2025    HGB 9.5 2025    HCT 29.2 2025    .0 2025    CREATSERUM  1.59 05/24/2025    CREATSERUM 1.59 05/24/2025    BUN 32 05/24/2025    BUN 32 05/24/2025     05/24/2025     05/24/2025    K 3.5 05/24/2025    K 3.5 05/24/2025     05/24/2025     05/24/2025    CO2 15.0 05/24/2025    CO2 15.0 05/24/2025     05/24/2025     05/24/2025    CA 6.7 05/24/2025    CA 6.7 05/24/2025    ALB 2.4 05/24/2025    ALKPHO 35 05/24/2025    BILT 0.5 05/24/2025    TP 5.0 05/24/2025    AST 39 05/24/2025    ALT 23 05/24/2025    PGLU 262 05/24/2025         Thank you for allowing our practice to participate in the care of your patient. Please do not hesitate to contact me if you have any questions.    Clem Díaz MD  5/24/2025  10:13 AM         [1]   Current Facility-Administered Medications   Medication Dose Route Frequency    atropine 0.1 MG/ML injection        propofol (Diprivan) 10 mg/mL infusion premix  5-50 mcg/kg/min (Dosing Weight) Intravenous Continuous    dilTIAZem 10 mg BOLUS FROM BAG infusion  10 mg Intravenous Q1H PRN    dilTIAZem (cardIZEM) 100 mg in sodium chloride 0.9% 100 mL IVPB-ADDV  2.5-20 mg/hr Intravenous Continuous    dilTIAZem (cardIZEM) tab 30 mg  30 mg Oral 4 times per day    norepinephrine (Levophed) 4 mg/250mL infusion premix  0.5-50 mcg/min Intravenous Continuous    methylPREDNISolone sodium succinate (Solu-MEDROL) injection 40 mg  40 mg Intravenous Q8H    diphenhydrAMINE (Benadryl) 50 mg/mL  injection 25 mg  25 mg Intravenous Q8H    pantoprazole (Protonix) 40 mg in sodium chloride 0.9% PF 10 mL IV push  40 mg Intravenous Daily    metoprolol tartrate (Lopressor) tab 25 mg  25 mg Oral 2x Daily(Beta Blocker)    acetaminophen (Tylenol) tab 650 mg  650 mg Oral Q4H PRN    ondansetron (Zofran) 4 MG/2ML injection 4 mg  4 mg Intravenous Q6H PRN    metoclopramide (Reglan) 5 mg/mL injection 5 mg  5 mg Intravenous Q8H PRN    glucose (Dex4) 15 GM/59ML oral liquid 15 g  15 g Oral Q15 Min PRN    Or    glucose (Glutose) 40% oral gel 15 g  15 g Oral Q15 Min  PRN    Or    glucose-vitamin C (Dex-4) chewable tab 4 tablet  4 tablet Oral Q15 Min PRN    Or    dextrose 50% injection 50 mL  50 mL Intravenous Q15 Min PRN    Or    glucose (Dex4) 15 GM/59ML oral liquid 30 g  30 g Oral Q15 Min PRN    Or    glucose (Glutose) 40% oral gel 30 g  30 g Oral Q15 Min PRN    Or    glucose-vitamin C (Dex-4) chewable tab 8 tablet  8 tablet Oral Q15 Min PRN   [2]   Past Medical History:   A-fib (HCC)    CVA (cerebral vascular accident) (HCC)    Diabetes (HCC)   [3]   Past Surgical History:  Procedure Laterality Date    Colonoscopy N/A 5/22/2025    Procedure: COLONOSCOPY;  Surgeon: Cesar Byrne MD;  Location: [facility] ENDOSCOPY   [4] No Known Allergies

## 2025-05-25 PROBLEM — F01.50 MIXED ALZHEIMER AND VASCULAR DEMENTIA (HCC): Status: ACTIVE | Noted: 2025-05-25

## 2025-05-25 PROBLEM — G30.9 MIXED ALZHEIMER AND VASCULAR DEMENTIA (HCC): Status: ACTIVE | Noted: 2025-01-01

## 2025-05-25 PROBLEM — R25.1 TREMOR: Status: ACTIVE | Noted: 2025-01-01

## 2025-05-25 PROBLEM — F02.80 MIXED ALZHEIMER AND VASCULAR DEMENTIA (HCC): Status: ACTIVE | Noted: 2025-05-25

## 2025-05-25 PROBLEM — F01.50 MIXED ALZHEIMER AND VASCULAR DEMENTIA (HCC): Status: ACTIVE | Noted: 2025-01-01

## 2025-05-25 PROBLEM — R25.1 TREMOR: Status: ACTIVE | Noted: 2025-05-25

## 2025-05-25 PROBLEM — G30.9 MIXED ALZHEIMER AND VASCULAR DEMENTIA (HCC): Status: ACTIVE | Noted: 2025-05-25

## 2025-05-25 PROBLEM — F02.80 MIXED ALZHEIMER AND VASCULAR DEMENTIA (HCC): Status: ACTIVE | Noted: 2025-01-01

## 2025-05-25 NOTE — RESPIRATORY THERAPY NOTE
RESPIRATORY THERAPY MECHANICAL VENTILATION PROGRESS NOTE    Ventilator Weaning:  Patient meets criteria for weaning? yes Weaning was attempted yes using pressure support 5 cmH2O + PEEP 5 cmH2O. The patient tolerated well for 30 minutes. Extubated to 6 liter nasal cannula.       Current Ventilator Data:       05/25/25 0915   Spontaneous Breathing Trial   Spontaneous Breathing Trial Complete Y   Is the FiO2 <= 0.5? (titrated for sats 92-94%) Y   Is the PEEP <= 5? Y   Is the RSBI <=104 Y   Is the patient off pressor and narcotic / sedation drips? Y   Is the patient free of ventricular arrhythmias in the past 24 hours? Y   Is the patient's cough adequate? Y   Is the patient alert (neuro), able to follow commands? Y   Daily Screen Meets All Criteria Yes   Spontaneous Parameters   Airway Cuff Leak Test Volume Observed 300   Spontaneous RR Rate 14   Spontaneous Minute Volume 9   Average Spontaneous Tidal Volume 500   $ Spontaneous Vital Capacity 690   Total RSBI 28   Weaning Trials   Patient self-extubated? No   Compassionate wean? No   Spontaneous Breathing Trial Time Initiated 0915   Spontaneous Breathing Trial Duration 30   Spontaneous Breathing Trial Method PSV   Spontaneous Breathing Trial Settings 5/5   Pre Trial HR 79   Pre Trial RR 14   Pre Trial SpO2 97 %   Pre Trial /83   Pre Trial Vt 500   Post Trial HR 79   Post Trial RR 18   Post Trial SpO2 97 %   Post Trial /83   Post Trial Vt 450

## 2025-05-25 NOTE — PROGRESS NOTES
[facility]  Gastroenterology Progress Note    Aldo Valdez Patient Status:  Inpatient    1945 MRN [id number]   Location [facility] 2W/SW Attending Noe Liz DO   Hosp Day # 3 PCP Wyatt Ross DO     Subjective:  Aldo Valdez is a(n) 79 year old male.    Current complaints:   Clinically slowly improving.  Attempting weaning trials to come off of ventilator        Objective:  Blood pressure 153/76, pulse 92, temperature 98.1 °F (36.7 °C), temperature source Temporal, resp. rate 23, height 5' 9\" (1.753 m), weight 151 lb 14.4 oz (68.9 kg), SpO2 100%.  Respiratory: Decreased breath sounds  CV: Irregular  Abdomen: nondistended, soft, nontender  Extremities: no calf tenderness  Intubated awake, responds to commands    Labs:   Recent Labs   Lab 25  1424 25  1643 25  0527 25  1159 25  0236 25  0950 25  1157 25  1754 25  0033 25  0544   WBC 4.6  --  4.8  --   --  4.4  --   --   --  8.3   HGB 7.3*   < > 8.6*   < > 8.4* 9.5*   < > 9.1* 8.5* 8.6*   HCT 23.6*   < > 26.8*  --   --  29.2*  --   --   --  26.4*   .0  --  257.0  --   --  287.0  --   --   --  280.0   CREATSERUM 2.05*  --  1.72*  --  1.59*  1.59*  --   --   --   --  1.99*   BUN 40*  --  43*  --  32*  32*  --   --   --   --  45*     --  142  --  143  143  --   --   --   --  143   K 5.1  --  4.3  --  3.5  3.5  --   --   --   --  4.9     --  119*  --  117*  117*  --   --   --   --  114*   CO2 14.0*  --  16.0*  --  15.0*  15.0*  --   --   --   --  20.0*   *  --  90  --  226*  226*  --   --   --   --  229*   CA 8.9  --  8.0*  --  6.7*  6.7*  --   --   --   --  8.2*   ALB 3.7  --  2.9*  --  2.4*  --   --   --   --  3.2   ALKPHO 54  --  41*  --  35*  --   --   --   --  46   BILT 0.7  --  0.5  --  0.5  --   --   --   --  0.6   TP 7.3  --  5.8  --  5.0*  --   --   --   --  6.2   AST 15  --  12  --  39*  --   --   --   --  38*   ALT 14  --  9*  --  23  --    --   --   --  22   PTT 48.8*  --   --   --   --   --   --   --   --   --    INR 2.17*  --  1.47*  --   --   --   --   --   --   --    PTP 25.3*  --  18.6*  --   --   --   --   --   --   --     < > = values in this interval not displayed.       No results for input(s): \"YOHANA\", \"LIP\", \"GGT\", \"PSA\", \"DDIMER\", \"ESRML\", \"ESRPF\", \"CRP\", \"BNP\", \"TROP\", \"CK\", \"CKMB\", \"JAKOB\", \"RPR\", \"B12\", \"ETOH\", \"POCGLU\" in the last 168 hours.    Invalid input(s): \"RF\"     Imaging:  XR CHEST AP PORTABLE  (CPT=71045)  Result Date: 5/25/2025  PROCEDURE: XR CHEST AP PORTABLE  (CPT=71045) TIME: 5:24 a.m..   COMPARISON: [facility], XR CHEST AP PORTABLE (CPT=71045), 5/24/2025, 1:19 AM.  INDICATIONS: Follow up shortness of breath.  TECHNIQUE:   Single view.   FINDINGS/IMPRESSION:    1. There is an endotracheal tube with tip approximately 4 cm above the gaston.  There is enteric feeding tube with tip overlying the projected position of the gastric fundus.  2. The heart mediastinal structures are enlarged.  Pulmonary vascularity is slightly increased.  The findings are suggestive of slight fluid overload.  3. Lung volumes are diminished.  There is no dense consolidation or sizable effusion.    Dictated by (CST): Aj Carias MD on 5/25/2025 at 9:23 AM     Finalized by (CST): Aj Carias MD on 5/25/2025 at 9:24 AM          XR CHEST AP PORTABLE  (CPT=71045)  Result Date: 5/24/2025  CONCLUSION:  1. Endotracheal and enteric tubes are in customary positioning. 2. Development of mild retrocardiac opacity, which could relate to left basilar consolidation or atelectasis. 3. Stable cardiomegaly.   A preliminary report was issued by the [facility]iology teleradiology service. There are no major discrepancies.  [facility]  Dictated by (CST): Avtar Yo MD on 5/24/2025 at 8:10 AM     Finalized by (CST): Avtar Yo MD on 5/24/2025 at 8:14 AM             Problem list:  Problem List[1]    Assessment/Plan:    Acute GI  bleeding  Acute blood loss anemia.   Rectal bleeding  - Status post EGD colonoscopy Thursday evening.  EGD negative.   -Colonoscopy with diverticulosis and large pedunculated polyp in the rectosigmoid region that was fully removed with hot cautery snare.  The recent bleeding was felt to be more likely diverticular without culprit lesion identified.  Cannot completely rule out the large polyp contributing in the setting of anticoagulation however this was felt to be less likely.  - No further bleeding has been noted since endoscopy.  -hgb stable    4.   Cardiac arrest overnight  - Etiology unclear.  No signs of further bleeding.        Recs:  Monitor H&H and transfuse as necessary  Continue supportive care  3.   No further GI intervention at this time  4.   Follow-up pulmonary and critical care recommendations  5.   Stop serial hgb.  Daily cbc is fine    Cesar Byrne MD           [1]   Patient Active Problem List  Diagnosis    Essential hypertension    Back pain    Pure hypercholesterolemia    Cerebral infarction involving posterior cerebral artery, right (HCC)    Anxiety    Acute gastrointestinal hemorrhage

## 2025-05-25 NOTE — SLP NOTE
ADULT SWALLOWING EVALUATION    ASSESSMENT    ASSESSMENT/OVERALL IMPRESSION:    Orders received, chart reviewed, clinical bedside swallow evaluation initiated. Patient admitted from NH/rehab with GI bleed, CV arrest occurred 5/24/25 ?d/t angioedema with unclear source; CXR and CTH 5/24/25 reviewed as below. No hx SLP service per this EMR review. Patient extubated this AM however RN requests visit as patient intubated <24 hours. Given brief intubation period and no patient hx of dysphagia, appears reasonable to proceed with evaluation.     Patient positioned upright in bed, afebrile, O2 via NC, in NAD, grossly oriented x2, verbal/motor impulsive, speech intelligibility reduced to 60% in conversation with some persistent lingual swelling and xerostomia however also with baseline wet vocal quality. Patient accepted tsp presentations of thin liquid, mildly thick liquid, puree applesauce. Patient accepted bite of softened cracker and pinched straw sips thin liquid water. Oral phase efficiency reduced for solid with moderate diffuse oral stasis which required puree/liquid washes for clearance. Pharyngeal phase timing/control appears impaired with persistent wet vocal quality before, during, after PO trials and inconsistently able to use cued throat clearing. Patient with poor insight into deficits and suboptimal response to instructions/cues to improve safe PO intake.     Patient presents with evidence oropharyngeal dysphagia at bedside in context of acute hx intubation, chronic altered mentation and ?persistent motor speech deficits r/t angioedema. At this time, a safe/functional PO diet cannot be recommended. Acute SLP service will continue to follow closely while in house. Plan and recommendations reviewed with patient and RN at bedside. Written recommendations posted on whiteboard. FCM score: 1/7.       RECOMMENDATIONS   Diet Recommendations - Solids: NPO  Diet Recommendations - Liquids: NPO  Medication Administration  Recommendations: Non-oral  Treatment Plan/Recommendations: SLP to reassess    HISTORY   MEDICAL HISTORY  Reason for Referral: RN dysphagia screen (Extubated)    Problem List  Principal Problem:    Acute gastrointestinal hemorrhage    Past Medical History  Past Medical History[1]     Diet Prior to Admission: Unknown  Precautions: Aspiration    Patient/Family Goals: Drink water    SWALLOWING HISTORY  Current Diet Consistency: NPO  Dysphagia History: As above    Imaging Results:   CXR 5/25/25:  FINDINGS/IMPRESSION:   1. There is an endotracheal tube with tip approximately 4 cm above the gaston.  There is enteric feeding tube with tip overlying the projected position of the gastric fundus.   2. The heart mediastinal structures are enlarged.  Pulmonary vascularity is slightly increased.  The findings are suggestive of slight fluid overload.   3. Lung volumes are diminished.  There is no dense consolidation or sizable effusion.     OBJECTIVE     Dentition: Natural, Functional  Symmetry: Within Functional Limits  Strength: Overall reduced     Range of Motion: Overall reduced  Rate of Motion: Reduced    Voice Quality: Hoarse (Wet)  Respiratory Status: Supplemental O2, Nasal cannula, Recently extubated  Consistencies Trialed: Thin liquids, Nectar thick liquids, Puree, Soft solid  Method of Presentation: Staff/Clinician assistance, Spoon, Straw  Patient Positioned: Upright, Midline    Oral Phase of Swallow: Impaired  Bolus Retrieval: Intact  Bilabial Seal: Intact  Bolus Formation: Impaired  Bolus Propulsion: Impaired  Mastication: Impaired  Retention: Impaired    Pharyngeal Phase of Swallow: Appears Impaired  Laryngeal Elevation Timing: Appears impaired  Laryngeal Elevation Strength: Appears impaired  Laryngeal Elevation Coordination: Appears impaired  (Please note: Silent aspiration cannot be evaluated clinically. Videofluoroscopic Swallow Study is required to rule-out silent aspiration.)    Esophageal Phase of Swallow: No  complaints consistent with possible esophageal involvement      GOALS  Goal #1 Patient will participate in ongoing clinical bedside swallow evaluation as appropriate.    In Progress     FOLLOW UP  Treatment Plan/Recommendations: SLP to reassess  Duration: 1 week  Follow Up Needed (Documentation Required): Yes  SLP Follow-up Date: 05/26/25    Thank you for your referral.   If you have any questions, please contact BIGG Sidhu M.S. CCC-SLP  Speech-Language Pathologist  [phone]         [1]   Past Medical History:   A-fib (HCC)    CVA (cerebral vascular accident) (HCC)    Diabetes (HCC)

## 2025-05-25 NOTE — CONSULTS
[facility]  [address] [place] [address], [address]  [facility] [id number]  [phone]          INPATIENT NEUROLOGY   INITIAL CONSULT NOTE       [facility]  part of [facility]    Report of Consultation    Aldo Valdez Patient Status:  Inpatient     1945 MRN [id number]    Location [facility] 2W/SW Attending Edward Pa MD    Hosp Day # 3 PCP Wyatt Ross DO      Date of Admission:  2025  Date of Consult:  2025    HPI:     Aldo Valdez is a 79 year old man with past medical history of dementia, stroke, atrial fibrillation on Apixaban, type 2 diabetes who presented with rectal bleeding and anemia, with reversal of anticoagulation, who suffered a bradycardiac arrest 2025.  Neurology is consulted for tremors.    He cannot provide details of history due to dementia.  He is paranoid and irritable, speaking rudely and insultingly to nursing staff, accusing them of stealing his ID badge (which is visible and accessible to patient on his chest), not liking the nursing's staff's voice, etc.      CURRENT MEDICATIONS  Current Medications[1]    OUTPATIENT MEDICATIONS  Medications Ordered Prior to Encounter[2]     MEDICAL HISTORY  Past Medical History[3]    ?SURGICAL HISTORY  Past Surgical History[4]    SOCIAL HISTORY  Social Hx on file[5]    FAMILY HISTORY  Family History[6]    ALLERGIES  Allergies[7]    ?REVIEW OF SYSTEMS:   Cannot provide     ?PHYSICAL EXAM:   /80 (BP Location: Right arm)   Pulse 100   Temp 97.9 °F (36.6 °C) (Temporal)   Resp 22   Ht 69\"   Wt 151 lb 14.4 oz (68.9 kg)   SpO2 100%   BMI 22.43 kg/m²   General appearance: Well appearing, and in no acute distress  Skin: skin color, texture normal.  No rashes or lesions.    Head: Normocephalic, atraumatic.    Neurological exam:  The patient is awake and alert, speech is significantly dysarthric to the point of unintelligible speech, he can follow some commands (such as to lift arms  antigravity), he is irritable, he perseverates about being a  and wanting his ID badge, he tracks and attends, his upper extremities move against resistance with weakness of left > right upper extremities; and lower extremities below gravity.  He demonstrates diffuse high frequency and low amplitude postural and action tremors of his upper > lower extremities.        LABS/DATA:    Lab Results   Component Value Date    WBC 8.3 05/25/2025    HGB 8.6 05/25/2025    HCT 26.4 05/25/2025    .0 05/25/2025    CREATSERUM 1.99 05/25/2025    BUN 45 05/25/2025     05/25/2025    K 4.9 05/25/2025     05/25/2025    CO2 20.0 05/25/2025     05/25/2025    CA 8.2 05/25/2025    ALB 3.2 05/25/2025    ALKPHO 46 05/25/2025    BILT 0.6 05/25/2025    TP 6.2 05/25/2025    AST 38 05/25/2025    ALT 22 05/25/2025       HGBA1C:    Lab Results   Component Value Date    A1C 5.9 (H) 05/25/2025    A1C 7.8 (A) 12/18/2019     05/25/2025       Lab Results   Component Value Date    TRIG 112 05/25/2025           IMAGING:  CT Brain   CONCLUSION:   1. No acute intracranial finding.   2. 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   3. Moderate changes of chronic small vessel disease in cerebral white matter.   4. Chronic left cerebellar lacunar infarct related to small vessel disease.   5. Large vessel intracranial atherosclerosis.   I PERSONALLY REVIEWED THESE IMAGES.     ASSESSMENT:  The patient is a 79 year old man with past medical history of dementia, stroke, atrial fibrillation on Apixaban, type 2 diabetes who presented with rectal bleeding and anemia, with reversal of anticoagulation, who suffered a bradycardiac arrest 5/24/2025.  Neurology is consulted for tremors.    His CT brain shows an old right hemispheric infarct, moderate white matter changes and chronic left cerebellar infarct   His exam is significant for reduced fund of knowledge, perseveration, paranoia,  irritability, severe dysarthria, left sided weakness, all localizing to prior stroke bed and dementia.  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  They do not appear epileptic.  I do not think he needs treatment for these at this time as any treatments (such as Primidone) portend a high risk of further cardiac events.    No further neurological evaluation needed at this time.      This note was prepared using Dragon Medical voice recognition dictation software and as a result, errors may occur. When identified, these errors have been corrected. While every attempt is made to correct errors during dictation, discrepancies may still exist    EMANUEL Tobin DO   Staff Neurologist   5/25/2025  12:30 PM                     [1]   No current outpatient medications on file.   [2]   No current facility-administered medications on file prior to encounter.     Current Outpatient Medications on File Prior to Encounter   Medication Sig Dispense Refill    apixaban 5 MG Oral Tab Take 1 tablet (5 mg total) by mouth 2 (two) times daily.      diazePAM 2 MG Oral Tab Take 1 tablet (2 mg total) by mouth daily.      diazePAM 5 MG Oral Tab Take 1 tablet (5 mg total) by mouth at bedtime.      dilTIAZem 90 MG Oral Tab Take 2 tablets (180 mg total) by mouth 2 (two) times daily. HOLD if blood pressure <100 and heart rate <60      lisinopril 30 MG Oral Tab Take 1 tablet (30 mg total) by mouth before bedtime.      lisinopril 40 MG Oral Tab Take 1 tablet (40 mg total) by mouth in the morning.      metFORMIN 500 MG Oral Tab Take 1 tablet (500 mg total) by mouth 2 (two) times daily with meals.      metoprolol succinate ER 50 MG Oral Tablet 24 Hr Take 1 tablet (50 mg total) by mouth in the morning.      ergocalciferol 1.25 MG (53455 UT) Oral Cap Take 1 capsule (50,000 Units total) by mouth  once a week. Taking dose on Fridays     [3]   Past Medical History:   A-fib (HCC)    CVA (cerebral vascular accident) (HCC)    Diabetes (HCC)   [4]   Past Surgical History:  Procedure Laterality Date    Colonoscopy N/A 5/22/2025    Procedure: COLONOSCOPY;  Surgeon: Cesar Byrne MD;  Location: [facility] ENDOSCOPY   [5]   Social History  Socioeconomic History    Marital status:    Tobacco Use    Smoking status: Former     Types: Cigarettes     Passive exposure: Never    Smokeless tobacco: Never   Vaping Use    Vaping status: Never Used   Substance and Sexual Activity    Alcohol use: Yes     Alcohol/week: 6.0 - 7.0 standard drinks of alcohol     Types: 3 - 4 Standard drinks or equivalent, 3 Cans of beer per week   [6] No family history on file.  [7] No Known Allergies

## 2025-05-25 NOTE — PROGRESS NOTES
[facility] Pharmacy Dosing Service:  Dosage Form Evaluation  Aldo Valdez is a 79 year old patient who has an NPO diet, but can receive crushed/liquid medications.    Pharmacy reviewed the patient’s medication list and found that no medications need to be switched to a different route and/or dosage form at this time.  Thank you for allowing us to participate in the care of this patient.    [place] PharmD  5/25/2025  6:13 AM

## 2025-05-25 NOTE — PLAN OF CARE
Patient extubated at 0950. Worsening tremors noted in hands/legs. Neuro consulted, CT of the head done. SLP failed, patient NPO. Call light within reach, safety measures maintained.      Problem: Patient Centered Care  Goal: Patient preferences are identified and integrated in the patient's plan of care  Description: Interventions:  - What would you like us to know as we care for you? I used to be a .   - Provide timely, complete, and accurate information to patient/family  - Incorporate patient and family knowledge, values, beliefs, and cultural backgrounds into the planning and delivery of care  - Encourage patient/family to participate in care and decision-making at the level they choose  - Honor patient and family perspectives and choices  Outcome: Progressing     Problem: HEMATOLOGIC - ADULT  Goal: Maintains hematologic stability  Description: INTERVENTIONS  - Assess for signs and symptoms of bleeding or hemorrhage  - Monitor labs and vital signs for trends  - Administer supportive blood products/factors, fluids and medications as ordered and appropriate  - Administer supportive blood products/factors as ordered and appropriate  Outcome: Progressing  Goal: Free from bleeding injury  Description: (Example usage: patient with low platelets)  INTERVENTIONS:  - Avoid intramuscular injections, enemas and rectal medication administration  - Ensure safe mobilization of patient  - Hold pressure on venipuncture sites to achieve adequate hemostasis  - Assess for signs and symptoms of internal bleeding  - Monitor lab trends  - Patient is to report abnormal signs of bleeding to staff  - Avoid use of toothpicks and dental floss  - Use electric shaver for shaving  - Use soft bristle tooth brush  - Limit straining and forceful nose blowing  Outcome: Progressing     Problem: GASTROINTESTINAL - ADULT  Goal: Minimal or absence of nausea and vomiting  Description: INTERVENTIONS:  - Maintain adequate hydration with  IV or PO as ordered and tolerated  - Nasogastric tube to low intermittent suction as ordered  - Evaluate effectiveness of ordered antiemetic medications  - Provide nonpharmacologic comfort measures as appropriate  - Advance diet as tolerated, if ordered  - Obtain nutritional consult as needed  - Evaluate fluid balance  Outcome: Progressing  Goal: Maintains or returns to baseline bowel function  Description: INTERVENTIONS:  - Assess bowel function  - Maintain adequate hydration with IV or PO as ordered and tolerated  - Evaluate effectiveness of GI medications  - Encourage mobilization and activity  - Obtain nutritional consult as needed  - Establish a toileting routine/schedule  - Consider collaborating with pharmacy to review patient's medication profile  Outcome: Progressing     Problem: CARDIOVASCULAR - ADULT  Goal: Maintains optimal cardiac output and hemodynamic stability  Description: INTERVENTIONS:  - Monitor vital signs, rhythm, and trends  - Monitor for bleeding, hypotension and signs of decreased cardiac output  - Evaluate effectiveness of vasoactive medications to optimize hemodynamic stability  - Monitor arterial and/or venous puncture sites for bleeding and/or hematoma  - Assess quality of pulses, skin color and temperature  - Assess for signs of decreased coronary artery perfusion - ex. Angina  - Evaluate fluid balance, assess for edema, trend weights  Outcome: Progressing  Goal: Absence of cardiac arrhythmias or at baseline  Description: INTERVENTIONS:  - Continuous cardiac monitoring, monitor vital signs, obtain 12 lead EKG if indicated  - Evaluate effectiveness of antiarrhythmic and heart rate control medications as ordered  - Initiate emergency measures for life threatening arrhythmias  - Monitor electrolytes and administer replacement therapy as ordered  Outcome: Progressing     Problem: Diabetes/Glucose Control  Goal: Glucose maintained within prescribed range  Description: INTERVENTIONS:  -  Monitor Blood Glucose as ordered  - Assess for signs and symptoms of hyperglycemia and hypoglycemia  - Administer ordered medications to maintain glucose within target range  - Assess barriers to adequate nutritional intake and initiate nutrition consult as needed  - Instruct patient on self management of diabetes  Outcome: Progressing

## 2025-05-25 NOTE — PROGRESS NOTES
[facility]  part of [facility]    Progress Note      Assessment and Plan:   1.  Cardiopulmonary arrest-the exact etiology is uncertain; however, I have concerns that the patient developed angioedema resulting in respiratory impairment contributing to subsequent bradycardia arrhythmia and asystolic arrest.  The patient was successfully resuscitated and intubated and now is stabilizing on ventilator.  The patient had been on lisinopril but none had been given on this admission.  The patient had an EGD prior to this event.  Regardless, he is doing better at this juncture.  Chest x-ray suggest mild vascular prominence but is otherwise unremarkable.  The patient passed his breathing trial.    Recommendations:  1.  Solu-Medrol-decrease dosing to once daily  2.  Benadryl  3.  Pepcid  4.  Extubation  5.  Will follow clinically.  6.  Will hold lisinopril  7.  Morning x-ray.    2.  GI bleeding-no evidence that it is active at present.  Possibly diverticular.  Colon polyp removed.    Recommendations:  1.  Pepcid  2.  Serial hemoglobin  3.  As per GI  4.  Await pathology    3.  DVT prophylaxis-SCDs    4.  CODE STATUS-full    5.  Atrial fibrillation\flutter-was on Eliquis now held.  Rate acceptable.    6.  OBS-we will continue with neurological examinations in the short-term    7.  ZOE-creatinine up to 1.99.  Will gently hydrate.        Subjective:   Aldo Valdez is a(n) 79 year old male who is breathing comfortably    Objective:   Blood pressure 152/89, pulse 95, temperature 97.9 °F (36.6 °C), temperature source Temporal, resp. rate 24, height 175.3 cm (5' 9\"), weight 151 lb 14.4 oz (68.9 kg), SpO2 100%.    Physical Exam alert white male  HEENT examination is unremarkable with pupils equal round and reactive to light and accommodation.   Neck without adenopathy, thyromegaly, JVD nor bruit.   Lungs slightly diminished to auscultation and percussion.  Cardiac regular rate and rhythm no murmur.   Abdomen  nontender, without hepatosplenomegaly and no mass appreciable.   Extremities without clubbing cyanosis nor edema.   Neurologic verbal and following commands and much more appropriate now that he is extubated.  Skin without gross abnormality     Results:     Lab Results   Component Value Date    WBC 8.3 05/25/2025    HGB 8.6 05/25/2025    HCT 26.4 05/25/2025    .0 05/25/2025    CREATSERUM 1.99 05/25/2025    BUN 45 05/25/2025     05/25/2025    K 4.9 05/25/2025     05/25/2025    CO2 20.0 05/25/2025     05/25/2025    CA 8.2 05/25/2025    ALB 3.2 05/25/2025    ALKPHO 46 05/25/2025    BILT 0.6 05/25/2025    TP 6.2 05/25/2025    AST 38 05/25/2025    ALT 22 05/25/2025       Ishmael Fonseca MD  Medical Director, Critical Care, [facility]  Medical Director, [facility]  Pager: [phone]

## 2025-05-25 NOTE — PROGRESS NOTES
Received PT intubated with a (7.5) ETT secured at (27) on vent with the following settings; BS heard bilaterally, SXN provided as needed. Mode was changed to VC+ and O2 was titrated to 30%. Pt tolerated well. RT to continue to monitor.        05/25/25 0540   Vent Information   Vent Mode VC+   Settings   FiO2 (%) (S)  30 %   Resp Rate (Set) 14   Vt (Set, mL) 500 mL   Waveform Decelerating ramp   PEEP/CPAP (cm H2O) 5 cm H20   Trigger Sensitivity Flow (L/min) 3 L/min   Humidification Heat and moisture exchanger   Readings   Total RR 24   Minute Ventilation (L/min) 13.6 L/min   Expiratory Tidal Volume 540 mL   PIP Observed (cm H2O) 17 cm H2O   MAP (cm H2O) 11   I/E Ratio 1:2.4   Plateau Pressure (cm H2O) 15 cm H2O

## 2025-05-25 NOTE — PLAN OF CARE
Problem: Diabetes/Glucose Control  Goal: Glucose maintained within prescribed range  Description: INTERVENTIONS:  - Monitor Blood Glucose as ordered  - Assess for signs and symptoms of hyperglycemia and hypoglycemia  - Administer ordered medications to maintain glucose within target range  - Assess barriers to adequate nutritional intake and initiate nutrition consult as needed  - Instruct patient on self management of diabetes  Outcome: Progressing     Problem: CARDIOVASCULAR - ADULT  Goal: Maintains optimal cardiac output and hemodynamic stability  Description: INTERVENTIONS:  - Monitor vital signs, rhythm, and trends  - Monitor for bleeding, hypotension and signs of decreased cardiac output  - Evaluate effectiveness of vasoactive medications to optimize hemodynamic stability  - Monitor arterial and/or venous puncture sites for bleeding and/or hematoma  - Assess quality of pulses, skin color and temperature  - Assess for signs of decreased coronary artery perfusion - ex. Angina  - Evaluate fluid balance, assess for edema, trend weights  Outcome: Progressing  Goal: Absence of cardiac arrhythmias or at baseline  Description: INTERVENTIONS:  - Continuous cardiac monitoring, monitor vital signs, obtain 12 lead EKG if indicated  - Evaluate effectiveness of antiarrhythmic and heart rate control medications as ordered  - Initiate emergency measures for life threatening arrhythmias  - Monitor electrolytes and administer replacement therapy as ordered  Outcome: Progressing     Problem: GASTROINTESTINAL - ADULT  Goal: Minimal or absence of nausea and vomiting  Description: INTERVENTIONS:  - Maintain adequate hydration with IV or PO as ordered and tolerated  - Nasogastric tube to low intermittent suction as ordered  - Evaluate effectiveness of ordered antiemetic medications  - Provide nonpharmacologic comfort measures as appropriate  - Advance diet as tolerated, if ordered  - Obtain nutritional consult as needed  -  Evaluate fluid balance  Outcome: Progressing  Goal: Maintains or returns to baseline bowel function  Description: INTERVENTIONS:  - Assess bowel function  - Maintain adequate hydration with IV or PO as ordered and tolerated  - Evaluate effectiveness of GI medications  - Encourage mobilization and activity  - Obtain nutritional consult as needed  - Establish a toileting routine/schedule  - Consider collaborating with pharmacy to review patient's medication profile  Outcome: Progressing     Problem: HEMATOLOGIC - ADULT  Goal: Maintains hematologic stability  Description: INTERVENTIONS  - Assess for signs and symptoms of bleeding or hemorrhage  - Monitor labs and vital signs for trends  - Administer supportive blood products/factors, fluids and medications as ordered and appropriate  - Administer supportive blood products/factors as ordered and appropriate  Outcome: Progressing  Goal: Free from bleeding injury  Description: (Example usage: patient with low platelets)  INTERVENTIONS:  - Avoid intramuscular injections, enemas and rectal medication administration  - Ensure safe mobilization of patient  - Hold pressure on venipuncture sites to achieve adequate hemostasis  - Assess for signs and symptoms of internal bleeding  - Monitor lab trends  - Patient is to report abnormal signs of bleeding to staff  - Avoid use of toothpicks and dental floss  - Use electric shaver for shaving  - Use soft bristle tooth brush  - Limit straining and forceful nose blowing  Outcome: Progressing     Problem: Patient Centered Care  Goal: Patient preferences are identified and integrated in the patient's plan of care  Description: Interventions:  - What would you like us to know as we care for you?   - Provide timely, complete, and accurate information to patient/family  - Incorporate patient and family knowledge, values, beliefs, and cultural backgrounds into the planning and delivery of care  - Encourage patient/family to participate in  care and decision-making at the level they choose  - Honor patient and family perspectives and choices  Outcome: Progressing     Problem: Patient/Family Goals  Goal: Patient/Family Long Term Goal  Description: Patient's Long Term Goal:     Interventions:  - See additional Care Plan goals for specific interventions  Outcome: Progressing  Goal: Patient/Family Short Term Goal  Description: Patient's Short Term Goal:     Interventions:   - See additional Care Plan goals for specific interventions  Outcome: Progressing     Problem: Safety Risk - Non-Violent Restraints  Goal: Patient will remain free from self-harm  Description: INTERVENTIONS:  - Apply the least restrictive restraint to prevent harm  - Notify patient and family of reasons restraints applied  - Assess for any contributing factors to confusion (electrolyte disturbances, delirium, medications)  - Discontinue any unnecessary medical devices as soon as possible  - Assess the patient's physical comfort, circulation, skin condition, hydration, nutrition and elimination needs   - Reorient and redirection as needed  - Assess for the need to continue restraints  Outcome: Not Progressing

## 2025-05-25 NOTE — PROGRESS NOTES
[facility] Cardiology  Progress Note    Aldo Valdez Patient Status:  Inpatient    1945 MRN [id number]   Location [facility] 2W/SW Attending Edward Pa MD   Hosp Day # 3 PCP Wyatt Ross DO       Impression:  1. witnessed bradycardic CV arrest (25), +throat swelling- anaphylactic reaction? (unknown culprit medication)--> steroids/benadryl, holding lisinopril     2. pAF on apixaban--> GIB, Hg 6.4 s/p prbc transfusion--> AC held, EGD/C-scope 25: diverticulosis (likely cause), polyp s/p cautery/snare.     Recommendations:  - CV arrest: cause not entirely clear. angioedema/anaphlaxis, respiratory distress leading to bradycardic arrest is likely scenario.  Outpatient lisinopril (didn't get here), will discontinue indefinitely. ECG without ischemic changes.  Currently AF rate mostly controlled on oral diltiazem, monitor telemetry.    - f/u TTE.  - AC: holding anticoagulation for now; resume apixaban 5mg po bid when acceptable from GI perspective.  - vent management, weaning trial today.  - steroids/benadryl     CC time 30 minutes.      Subjective:  No issues overnight.  tele- no sig arrhythmias, remains in rate controlled AF.    Objective:  /89   Pulse 90   Temp 98.1 °F (36.7 °C) (Temporal)   Resp 23   Ht 5' 9\" (1.753 m)   Wt 151 lb 14.4 oz (68.9 kg)   SpO2 100%   BMI 22.43 kg/m²   Temp (24hrs), Av.7 °F (36.5 °C), Min:97.3 °F (36.3 °C), Max:98.3 °F (36.8 °C)      Intake/Output Summary (Last 24 hours) at 2025 0964  Last data filed at 2025 0500  Gross per 24 hour   Intake 281.2 ml   Output 330 ml   Net -48.8 ml     Wt Readings from Last 3 Encounters:   25 151 lb 14.4 oz (68.9 kg)   10/08/24 180 lb (81.6 kg)   19 188 lb (85.3 kg)       General: intubated/sedated.  Cardiac: irregularly irregular; no murmurs/rubs/gallops are appreciated  Lungs: Clear to auscultation bilaterally; no accessory muscle use  Abdomen: Soft, non-tender; bowel sounds are  normoactive  Extremities: No clubbing/cyanosis; moves all 4 extremities normally    Current Hospital Medications[1]    Laboratory Data:  Lab Results   Component Value Date    WBC 8.3 05/25/2025    HGB 8.6 05/25/2025    HCT 26.4 05/25/2025    .0 05/25/2025     Lab Results   Component Value Date    INR 1.47 (H) 05/23/2025    INR 2.17 (H) 05/22/2025     Lab Results   Component Value Date     05/25/2025    K 4.9 05/25/2025     05/25/2025    CO2 20.0 05/25/2025    BUN 45 05/25/2025    CREATSERUM 1.99 05/25/2025     05/25/2025    CA 8.2 05/25/2025       Telemetry: No malignant tachyarrhythmias or bradyarrhythmias      Thank you for allowing our practice to participate in the care of your patient. Please do not hesitate to contact me if you have any questions.           [1]   Current Facility-Administered Medications   Medication Dose Route Frequency    propofol (Diprivan) 10 mg/mL infusion premix  5-50 mcg/kg/min (Dosing Weight) Intravenous Continuous    dilTIAZem 10 mg BOLUS FROM BAG infusion  10 mg Intravenous Q1H PRN    dilTIAZem (cardIZEM) 100 mg in sodium chloride 0.9% 100 mL IVPB-ADDV  2.5-20 mg/hr Intravenous Continuous    [Held by provider] dilTIAZem (cardIZEM) tab 30 mg  30 mg Oral 4 times per day    methylPREDNISolone sodium succinate (Solu-MEDROL) injection 40 mg  40 mg Intravenous Q8H    diphenhydrAMINE (Benadryl) 50 mg/mL  injection 25 mg  25 mg Intravenous Q8H    pantoprazole (Protonix) 40 mg in sodium chloride 0.9% PF 10 mL IV push  40 mg Intravenous Daily    metoprolol tartrate (Lopressor) tab 25 mg  25 mg Oral 2x Daily(Beta Blocker)    acetaminophen (Tylenol) tab 650 mg  650 mg Oral Q4H PRN    ondansetron (Zofran) 4 MG/2ML injection 4 mg  4 mg Intravenous Q6H PRN    metoclopramide (Reglan) 5 mg/mL injection 5 mg  5 mg Intravenous Q8H PRN    glucose (Dex4) 15 GM/59ML oral liquid 15 g  15 g Oral Q15 Min PRN    Or    glucose (Glutose) 40% oral gel 15 g  15 g Oral Q15 Min PRN    Or     glucose-vitamin C (Dex-4) chewable tab 4 tablet  4 tablet Oral Q15 Min PRN    Or    dextrose 50% injection 50 mL  50 mL Intravenous Q15 Min PRN    Or    glucose (Dex4) 15 GM/59ML oral liquid 30 g  30 g Oral Q15 Min PRN    Or    glucose (Glutose) 40% oral gel 30 g  30 g Oral Q15 Min PRN    Or    glucose-vitamin C (Dex-4) chewable tab 8 tablet  8 tablet Oral Q15 Min PRN

## 2025-05-25 NOTE — PROGRESS NOTES
[facility] Hospitalist Progress Note     CC: Hospital Follow up    PCP: Wyatt Ross,        Assessment/Plan:   This is a 79-year-old male with history of dementia, stroke, atrial fibrillation on Eliquis, hypertension, type 2 diabetes, CKD, who presents to the hospital for evaluation of rectal bleeding.  S/p EGD/C-scope 5/23/25.      5/24/25 CODE BLUE was called for increased secretions with tongue swelling and bradycardia which led to asystolic arrest.  CPR was initiated and patient was intubated.     Angioedema  - Tongue swelling with increased secretions  - Requiring intubation 5/24/2025  - IV steroids and Benadryl started  - Full vent support  - Critical care on consult  - ABG reviewed  - Hold lisinopril going forward    Tremors   - intention tremors, started last night   - off sedation this AM  - will check CTH     Rectal bleeding   Hypotension, likely had component of hemorrhagic shock on admission   Acute blood loss anemia  High suspicion for diverticular hemorrhage  - 2 units of PRBC given stat in ER  - Kcentra infusion in ER  - CTA abdomen and pelvis with active extravasation in sigmoid   - GI consulted   - underwent colonoscopy urgently on admission to attempt to clip/stop bleed  - S/p EGD/C-scope 5/23/25 Colonoscopy with diverticulosis and large pedunculated polyp in the rectosigmoid region that was fully removed with hot cautery snare, bleeding was felt to be more likely diverticular without culprit lesion identified   - serial Hemoglobin monitoring  - PPI IV   - if further bleeding may need IR involvement      Atrial fibrillation  - hold eliquis  - takes diltiazem 180mg BID, and toprol 50mg daily   - dilt drip   - cards consulted   - Echo pending      DM  - A1c, ISS while npo     ZOE on CKD  Acidosis   - Cr up trending   - Likely due to decreased perfusion from bradycardic episode with cardiac arrest  - Bicarb improving  - Will decide on IV fluids after echo results    Dementia  - resides at  [facility] [place]     Fluids: hold for now  Diet: NPO  DVT prophylaxis: hold chemical prophy     Edward Pa MD   [facility] Hospitalist        Subjective:     Intubated, off sedation.  Able to squeeze hands bilaterally with some intention tremor    OBJECTIVE:    Blood pressure 145/89, pulse 90, temperature 98.1 °F (36.7 °C), temperature source Temporal, resp. rate 23, height 5' 9\" (1.753 m), weight 151 lb 14.4 oz (68.9 kg), SpO2 100%.    Temp:  [97.3 °F (36.3 °C)-98.3 °F (36.8 °C)] 98.1 °F (36.7 °C)  Pulse:  [] 90  Resp:  [18-24] 23  BP: (136-165)/() 145/89  SpO2:  [98 %-100 %] 100 %  FiO2 (%):  [30 %-60 %] 30 %      Intake/Output:    Intake/Output Summary (Last 24 hours) at 5/25/2025 0955  Last data filed at 5/25/2025 0500  Gross per 24 hour   Intake 281.2 ml   Output 330 ml   Net -48.8 ml       Last 3 Weights   05/25/25 0200 151 lb 14.4 oz (68.9 kg)   05/22/25 2238 157 lb 6.5 oz (71.4 kg)   05/22/25 1426 180 lb (81.6 kg)   10/08/24 1853 180 lb (81.6 kg)   12/18/19 0857 188 lb (85.3 kg)       /89   Pulse 90   Temp 98.1 °F (36.7 °C) (Temporal)   Resp 23   Ht 5' 9\" (1.753 m)   Wt 151 lb 14.4 oz (68.9 kg)   SpO2 100%   BMI 22.43 kg/m²   General: Intubated   Lungs: Mechanical breath sounds  Heart: Irregularly irregular  Abdomen: soft, non tender  Extremities: No edema  Neuro: Able to squeeze hands bilaterally with some intention tremor    Data Review:       Labs:     Recent Labs   Lab 05/23/25  0527 05/23/25  1159 05/24/25  0950 05/24/25  1157 05/24/25  1754 05/25/25  0033 05/25/25  0544   RBC 3.26*  --  3.66*  --   --   --  3.24*   HGB 8.6*   < > 9.5*   < > 9.1* 8.5* 8.6*   HCT 26.8*  --  29.2*  --   --   --  26.4*   MCV 82.2  --  79.8*  --   --   --  81.5   MCH 26.4  --  26.0  --   --   --  26.5   MCHC 32.1  --  32.5  --   --   --  32.6   RDW 16.2*  --  16.8*  --   --   --  17.4*   NEPRELIM 2.31  --  3.58  --   --   --  6.87   WBC 4.8  --  4.4  --   --   --  8.3   PLT  257.0  --  287.0  --   --   --  280.0    < > = values in this interval not displayed.         Recent Labs   Lab 05/23/25  0527 05/24/25  0236 05/25/25  0544   GLU 90 226*  226* 229*   BUN 43* 32*  32* 45*   CREATSERUM 1.72* 1.59*  1.59* 1.99*   EGFRCR 40* 44*  44* 34*   CA 8.0* 6.7*  6.7* 8.2*    143  143 143   K 4.3 3.5  3.5 4.9   * 117*  117* 114*   CO2 16.0* 15.0*  15.0* 20.0*       Recent Labs   Lab 05/22/25  1424 05/23/25  0527 05/24/25  0236 05/25/25  0544   ALT 14 9* 23 22   AST 15 12 39* 38*   ALB 3.7 2.9* 2.4* 3.2         Imaging:  XR CHEST AP PORTABLE  (CPT=71045)  Result Date: 5/25/2025  PROCEDURE: XR CHEST AP PORTABLE  (CPT=71045) TIME: 5:24 a.m..   COMPARISON: [facility], XR CHEST AP PORTABLE (CPT=71045), 5/24/2025, 1:19 AM.  INDICATIONS: Follow up shortness of breath.  TECHNIQUE:   Single view.   FINDINGS/IMPRESSION:    1. There is an endotracheal tube with tip approximately 4 cm above the gaston.  There is enteric feeding tube with tip overlying the projected position of the gastric fundus.  2. The heart mediastinal structures are enlarged.  Pulmonary vascularity is slightly increased.  The findings are suggestive of slight fluid overload.  3. Lung volumes are diminished.  There is no dense consolidation or sizable effusion.    Dictated by (CST): Aj Carias MD on 5/25/2025 at 9:23 AM     Finalized by (CST): Aj Carias MD on 5/25/2025 at 9:24 AM          XR CHEST AP PORTABLE  (CPT=71045)  Result Date: 5/24/2025  CONCLUSION:  1. Endotracheal and enteric tubes are in customary positioning. 2. Development of mild retrocardiac opacity, which could relate to left basilar consolidation or atelectasis. 3. Stable cardiomegaly.   A preliminary report was issued by the [facility] Radiology teleradiology service. There are no major discrepancies.  [facility]-remote  Dictated by (CST): Avtar Yo MD on 5/24/2025 at 8:10 AM     Finalized by (CST): Avtar Yo MD  on 5/24/2025 at 8:14 AM              Meds:     Scheduled Medications[1]  Medication Infusions[2]  PRN Medications[3]                 [1]    [Held by provider] dilTIAZem  30 mg Oral 4 times per day    methylPREDNISolone  40 mg Intravenous Q8H    diphenhydrAMINE  25 mg Intravenous Q8H    pantoprazole  40 mg Intravenous Daily    metoprolol tartrate  25 mg Oral 2x Daily(Beta Blocker)   [2]    propofol Stopped (05/25/25 0911)    dilTIAZem 10 mg/hr (05/25/25 0947)   [3]   dilTIAZem    acetaminophen    ondansetron    metoclopramide    glucose **OR** glucose **OR** glucose-vitamin C **OR** dextrose **OR** glucose **OR** glucose **OR** glucose-vitamin C

## 2025-05-26 NOTE — SLP NOTE
ADULT SWALLOWING RE-EVALUATION    ASSESSMENT    ASSESSMENT/OVERALL IMPRESSION:  PPE REQUIRED. THIS THERAPIST WORE GLOVES, DROPLET MASK, AND GOGGLES FOR DURATION OF EVALUATION. HANDS WASHED UPON ENTRANCE/EXIT.    A swallow re-evaluation warranted as pt remains NPO following failed BSSE. Pt afebrile with hoarse vocal quality, on room air, with oxygen saturation at 92. Pt with no prior hx of dysphagia at [facility]. Pt positioned upright in bed. Pt with no complaints of pain, RN aware. Pt with adequate oral acceptance and bilabial seal across all trials of puree, soft solids, and mildly thick liquids. Pt with anterior spillage of thin liquids. Pt with weak bite, prolonged mastication of solids, and increased KEYSHA. Pt's swallow response appears delayed with reduced hyolaryngeal elevation/excursion. No clinical signs of aspiration (e.g., immediate/delayed throat clear, immediate/delayed cough, wet vocal quality, increased O2 effort) observed across all trials of puree, soft solids, and mildly thick liquids. OVERT signs/symptoms of aspiration observed with thin liquids as evidenced by immediate wet vocal quality, throat clearing, and increased SOB with desaturation to 87%. Trials discontinued. Oral/buccal cavities clear of residue following all trials.     At this time, pt presents with mild oral dysphagia and probable pharyngeal dysfunction. Recommend a minced and moist diet and mildly thick liquids with strict adherence to safe swallowing compensatory strategies. Results and recommendations reviewed with RN and pt. Pt's RN v/u to all results/recommendations. Pt confused and unable to v/u to all recommendations.  Recommendations remain written on whiteboard. SLP collaborated with RN for MD diet orders.     PLAN: SLP to f/u x4-5 meal assessments, monitor CXR, and VFSS if clinically warranted.     RECOMMENDATIONS   Diet Recommendations - Solids: Mechanical soft ground/ Minced & Moist  Diet Recommendations - Liquids: Nectar thick  liquids/ Mildly thick      Compensatory Strategies Recommended: Alternate consistencies, Multiple swallows  Aspiration Precautions: Upright position, Slow rate, Small bites, Small sips, No straw, 1:1 feeding  Medication Administration Recommendations: Crushed in puree  Treatment Plan/Recommendations: Aspiration precautions    HISTORY   MEDICAL HISTORY  Reason for Referral: RN dysphagia screen (Extubated)    Problem List  Principal Problem:    Acute gastrointestinal hemorrhage  Active Problems:    Tremor    Mixed Alzheimer and vascular dementia (HCC)      Past Medical History  Past Medical History[1]       Diet Prior to Admission: Unknown  Precautions: Aspiration    Patient/Family Goals: Pt wants to eat    SWALLOWING HISTORY  Current Diet Consistency: NPO  Dysphagia History: None prior to admission    Imaging Results:     CXR 5/26/25:  CONCLUSION:   1. Cardiomegaly with stable pulmonary edema consistent with mild CHF/fluid overload.   2. Endotracheal and enteric tubes have been removed.            Dictated by (CST): Avtar Yo MD on 5/26/2025 at 7:21 AM       Finalized by (CST): Avtar Yo MD on 5/26/2025 at 7:22 AM     CT BRAIN 5/25/25:  CONCLUSION:   1. No acute intracranial finding.   2. 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   3. Moderate changes of chronic small vessel disease in cerebral white matter.   4. Chronic left cerebellar lacunar infarct related to small vessel disease.   5. Large vessel intracranial atherosclerosis.   6. Evaluation degraded by patient related motion artifact.            Dictated by (CST): Tam Marcelo MD on 5/25/2025 at 2:12 PM       Finalized by (CST): Tam Marcelo MD on 5/25/2025 at 2:17 PM         OBJECTIVE   ORAL MOTOR EXAMINATION  Dentition: Natural, Functional  Symmetry: Within Functional Limits  Strength: Overall reduced     Range of Motion: Overall reduced  Rate of Motion: Reduced    Voice Quality: Hoarse (Wet)  Respiratory  Status: Supplemental O2, Nasal cannula, Recently extubated  Consistencies Trialed: Thin liquids, Nectar thick liquids, Puree, Soft solid  Method of Presentation: Staff/Clinician assistance, Spoon, Cup, Single sips  Patient Positioned: Upright, Midline    Oral Phase of Swallow: Impaired  Bolus Retrieval: Intact  Bilabial Seal: Impaired  Bolus Formation: Impaired  Bolus Propulsion: Impaired  Mastication: Impaired  Retention: Impaired    Pharyngeal Phase of Swallow: Appears Impaired  Laryngeal Elevation Timing: Appears impaired  Laryngeal Elevation Strength: Appears impaired  Laryngeal Elevation Coordination: Appears impaired  (Please note: Silent aspiration cannot be evaluated clinically. Videofluoroscopic Swallow Study is required to rule-out silent aspiration.)    Esophageal Phase of Swallow: No complaints consistent with possible esophageal involvement      GOALS  Goal #1 Patient will participate in ongoing clinical bedside swallow evaluation as appropriate.    In Progress   Goal #2 The patient will tolerate minced and moist consistency and mildly thick liquids without overt signs or symptoms of aspiration with 100 % accuracy over 2 session(s).  In Progress   Goal #3 The patient/family/caregiver will demonstrate understanding and implementation of aspiration precautions and swallow strategies independently over 4-5 session(s).  In Progress   Goal #4 The patient will tolerate trial upgrade of bite sized/ETC/regular consistency and thin liquids without overt signs or symptoms of aspiration with 100 % accuracy over 4-5 session(s).  In Progress   Goal #5 The patient will utilize compensatory strategies as outlined by  BSSE (clinical evaluation) including Slow rate, Small bites, Small sips, Alternate liquids/solids, No straws, Upright 90 degrees, Eliminate distractions, Feed patient with 1:1 feed assistance 100 % of the time across 2 sessions.  In Progress     FOLLOW UP  Treatment Plan/Recommendations: Aspiration  precautions  Duration: 1 week  Follow Up Needed (Documentation Required): Yes  SLP Follow-up Date: 05/27/25    Thank you for your referral.   If you have any questions, please contact BIGG Schultz M.S. CCC-SLP  Speech Language Pathologist  Phone Number Ext. [phone]             [1]   Past Medical History:   A-fib (HCC)    CVA (cerebral vascular accident) (HCC)    Diabetes (HCC)

## 2025-05-26 NOTE — PROGRESS NOTES
[facility]  part of [facility]    Progress Note      Assessment and Plan:   1.  Cardiopulmonary arrest-the exact etiology is uncertain; however, I have concerns that the patient developed angioedema resulting in respiratory impairment contributing to subsequent bradycardia arrhythmia and asystolic arrest.  The patient was successfully resuscitated and intubated but now extubated and doing well from a respiratory perspective.  The patient had been on lisinopril but none had been given on this admission.  The patient had an EGD prior to this event.  Regardless, he is doing better at this juncture.    The chest x-ray suggests mild edema.    Recommendations:  1.  Okay to discontinue Solu-Medrol, and Benadryl  2.  Will follow clinically.  3.  Will hold lisinopril  4.  Morning x-ray.  5.  Will give a dose of Lasix    2.  GI bleeding-no evidence that it is active at present.  Possibly diverticular.  Colon polyp removed.    Recommendations:  1.  Pepcid  2.  Serial hemoglobin  3.  As per GI  4.  Await pathology    3.  DVT prophylaxis-SCDs    4.  CODE STATUS-full    5.  Atrial fibrillation\flutter-was on Eliquis now held.  Rate acceptable.    6.  OBS-the patient is much more verbal and more wakeful.    7.  ZOE-creatinine up to 1.99.  Will gently hydrate.        Subjective:   Aldo Valdez is a(n) 79 year old male who is breathing comfortably    Objective:   Blood pressure 137/77, pulse 95, temperature 99.4 °F (37.4 °C), temperature source Temporal, resp. rate 24, height 175.3 cm (5' 9\"), weight 151 lb 14.4 oz (68.9 kg), SpO2 97%.    Physical Exam alert white male  HEENT examination is unremarkable with pupils equal round and reactive to light and accommodation.   Neck without adenopathy, thyromegaly, JVD nor bruit.   Lungs slightly diminished to auscultation and percussion.  Cardiac regular rate and rhythm no murmur.   Abdomen nontender, without hepatosplenomegaly and no mass appreciable.   Extremities  without clubbing cyanosis nor edema.   Neurologic verbal and following commands and much more appropriate now that he is extubated.  Skin without gross abnormality     Results:     Lab Results   Component Value Date    WBC 4.9 05/26/2025    HGB 7.0 05/26/2025    HCT 22.4 05/26/2025    .0 05/26/2025    CREATSERUM 1.83 05/26/2025    BUN 47 05/26/2025     05/26/2025    K 4.6 05/26/2025     05/26/2025    CO2 20.0 05/26/2025     05/26/2025    CA 8.1 05/26/2025    ALB 3.2 05/26/2025    ALKPHO 41 05/26/2025    BILT 0.8 05/26/2025    TP 6.2 05/26/2025    AST 27 05/26/2025    ALT 19 05/26/2025       Ishmael Fonseca MD  Medical Director, Critical Care, [facility]  Medical Director, [facility]  Pager: [phone]

## 2025-05-26 NOTE — PROGRESS NOTES
[facility] Cardiology  Progress Note    Aldo Valdez Patient Status:  Inpatient    1945 MRN [id number]   Location [facility] 2W/SW Attending Edward Pa MD   Hosp Day # 4 PCP Wyatt Ross DO       Impression:  1. witnessed bradycardic CV arrest (25), +throat swelling- anaphylactic reaction? (unknown culprit medication)--> steroids/benadryl, holding lisinopril     2. pAF on apixaban--> GIB, Hg 6.4 s/p prbc transfusion--> AC held, EGD/C-scope 25: diverticulosis (likely cause), polyp s/p cautery/snare.     Recommendations:  - CV arrest: cause not entirely clear. angioedema/anaphlaxis, respiratory distress leading to bradycardic arrest is likely scenario.  Outpatient lisinopril (didn't get here), will discontinue indefinitely. ECG without ischemic changes.  Currently AF rate mostly controlled on oral diltiazem, monitor telemetry.    - TTE reviewed: LVEF 55-60%  - AC: holding anticoagulation for now, jade with downtrending Hg again.  resume apixaban 5mg po bid prior to dc or when acceptable from GI perspective.  - rate control: acceptable.  metoprolol 25mg po bid.       Subjective:  No issues overnight.  tele- no sig arrhythmias, remains in rate controlled AF. confused.    Objective:  /77 (BP Location: Right arm)   Pulse 95   Temp 99.4 °F (37.4 °C) (Temporal)   Resp (!) 27   Ht 5' 9\" (1.753 m)   Wt 151 lb 14.4 oz (68.9 kg)   SpO2 99%   BMI 22.43 kg/m²   Temp (24hrs), Av.1 °F (37.3 °C), Min:97.9 °F (36.6 °C), Max:99.8 °F (37.7 °C)      Intake/Output Summary (Last 24 hours) at 2025 1027  Last data filed at 2025 1021  Gross per 24 hour   Intake 2210.5 ml   Output 1100 ml   Net 1110.5 ml     Wt Readings from Last 3 Encounters:   25 151 lb 14.4 oz (68.9 kg)   10/08/24 180 lb (81.6 kg)   19 188 lb (85.3 kg)       General: NAD, confused.  Cardiac: irregularly irregular; no murmurs/rubs/gallops are appreciated  Lungs: Clear to auscultation  bilaterally; no accessory muscle use  Abdomen: Soft, non-tender; bowel sounds are normoactive  Extremities: No clubbing/cyanosis; moves all 4 extremities normally    Current Hospital Medications[1]    Laboratory Data:  Lab Results   Component Value Date    WBC 4.9 05/26/2025    HGB 7.0 05/26/2025    HCT 22.4 05/26/2025    .0 05/26/2025     Lab Results   Component Value Date    INR 1.47 (H) 05/23/2025    INR 2.17 (H) 05/22/2025     Lab Results   Component Value Date     05/26/2025    K 4.6 05/26/2025     05/26/2025    CO2 20.0 05/26/2025    BUN 47 05/26/2025    CREATSERUM 1.83 05/26/2025     05/26/2025    CA 8.1 05/26/2025       Telemetry: No malignant tachyarrhythmias or bradyarrhythmias      Thank you for allowing our practice to participate in the care of your patient. Please do not hesitate to contact me if you have any questions.           [1]   Current Facility-Administered Medications   Medication Dose Route Frequency    dextrose 5%-sodium chloride 0.45% infusion   Intravenous Continuous    insulin aspart (NovoLOG) 100 Units/mL FlexPen 1-5 Units  1-5 Units Subcutaneous TID CC    methylPREDNISolone sodium succinate (Solu-MEDROL) injection 40 mg  40 mg Intravenous Daily    diphenhydrAMINE (Benadryl) 50 mg/mL  injection 25 mg  25 mg Intravenous Daily    LORazepam (Ativan) 2 mg/mL injection 0.5 mg  0.5 mg Intravenous Daily PRN    dilTIAZem 10 mg BOLUS FROM BAG infusion  10 mg Intravenous Q1H PRN    dilTIAZem (cardIZEM) 100 mg in sodium chloride 0.9% 100 mL IVPB-ADDV  2.5-20 mg/hr Intravenous Continuous    [Held by provider] dilTIAZem (cardIZEM) tab 30 mg  30 mg Oral 4 times per day    pantoprazole (Protonix) 40 mg in sodium chloride 0.9% PF 10 mL IV push  40 mg Intravenous Daily    metoprolol tartrate (Lopressor) tab 25 mg  25 mg Oral 2x Daily(Beta Blocker)    acetaminophen (Tylenol) tab 650 mg  650 mg Oral Q4H PRN    ondansetron (Zofran) 4 MG/2ML injection 4 mg  4 mg Intravenous Q6H PRN     metoclopramide (Reglan) 5 mg/mL injection 5 mg  5 mg Intravenous Q8H PRN    glucose (Dex4) 15 GM/59ML oral liquid 15 g  15 g Oral Q15 Min PRN    Or    glucose (Glutose) 40% oral gel 15 g  15 g Oral Q15 Min PRN    Or    glucose-vitamin C (Dex-4) chewable tab 4 tablet  4 tablet Oral Q15 Min PRN    Or    dextrose 50% injection 50 mL  50 mL Intravenous Q15 Min PRN    Or    glucose (Dex4) 15 GM/59ML oral liquid 30 g  30 g Oral Q15 Min PRN    Or    glucose (Glutose) 40% oral gel 30 g  30 g Oral Q15 Min PRN    Or    glucose-vitamin C (Dex-4) chewable tab 8 tablet  8 tablet Oral Q15 Min PRN

## 2025-05-26 NOTE — PLAN OF CARE
Problem: Diabetes/Glucose Control  Goal: Glucose maintained within prescribed range  Description: INTERVENTIONS:  - Monitor Blood Glucose as ordered  - Assess for signs and symptoms of hyperglycemia and hypoglycemia  - Administer ordered medications to maintain glucose within target range  - Assess barriers to adequate nutritional intake and initiate nutrition consult as needed  - Instruct patient on self management of diabetes  Outcome: Progressing     Problem: CARDIOVASCULAR - ADULT  Goal: Maintains optimal cardiac output and hemodynamic stability  Description: INTERVENTIONS:  - Monitor vital signs, rhythm, and trends  - Monitor for bleeding, hypotension and signs of decreased cardiac output  - Evaluate effectiveness of vasoactive medications to optimize hemodynamic stability  - Monitor arterial and/or venous puncture sites for bleeding and/or hematoma  - Assess quality of pulses, skin color and temperature  - Assess for signs of decreased coronary artery perfusion - ex. Angina  - Evaluate fluid balance, assess for edema, trend weights  Outcome: Progressing  Goal: Absence of cardiac arrhythmias or at baseline  Description: INTERVENTIONS:  - Continuous cardiac monitoring, monitor vital signs, obtain 12 lead EKG if indicated  - Evaluate effectiveness of antiarrhythmic and heart rate control medications as ordered  - Initiate emergency measures for life threatening arrhythmias  - Monitor electrolytes and administer replacement therapy as ordered  Outcome: Progressing     Problem: GASTROINTESTINAL - ADULT  Goal: Minimal or absence of nausea and vomiting  Description: INTERVENTIONS:  - Maintain adequate hydration with IV or PO as ordered and tolerated  - Nasogastric tube to low intermittent suction as ordered  - Evaluate effectiveness of ordered antiemetic medications  - Provide nonpharmacologic comfort measures as appropriate  - Advance diet as tolerated, if ordered  - Obtain nutritional consult as needed  -  Evaluate fluid balance  Outcome: Progressing  Goal: Maintains or returns to baseline bowel function  Description: INTERVENTIONS:  - Assess bowel function  - Maintain adequate hydration with IV or PO as ordered and tolerated  - Evaluate effectiveness of GI medications  - Encourage mobilization and activity  - Obtain nutritional consult as needed  - Establish a toileting routine/schedule  - Consider collaborating with pharmacy to review patient's medication profile  Outcome: Progressing     Problem: HEMATOLOGIC - ADULT  Goal: Maintains hematologic stability  Description: INTERVENTIONS  - Assess for signs and symptoms of bleeding or hemorrhage  - Monitor labs and vital signs for trends  - Administer supportive blood products/factors, fluids and medications as ordered and appropriate  - Administer supportive blood products/factors as ordered and appropriate  Outcome: Progressing  Goal: Free from bleeding injury  Description: (Example usage: patient with low platelets)  INTERVENTIONS:  - Avoid intramuscular injections, enemas and rectal medication administration  - Ensure safe mobilization of patient  - Hold pressure on venipuncture sites to achieve adequate hemostasis  - Assess for signs and symptoms of internal bleeding  - Monitor lab trends  - Patient is to report abnormal signs of bleeding to staff  - Avoid use of toothpicks and dental floss  - Use electric shaver for shaving  - Use soft bristle tooth brush  - Limit straining and forceful nose blowing  Outcome: Progressing     Problem: Patient Centered Care  Goal: Patient preferences are identified and integrated in the patient's plan of care  Description: Interventions:  - What would you like us to know as we care for you?   - Provide timely, complete, and accurate information to patient/family  - Incorporate patient and family knowledge, values, beliefs, and cultural backgrounds into the planning and delivery of care  - Encourage patient/family to participate in  care and decision-making at the level they choose  - Honor patient and family perspectives and choices  Outcome: Progressing     Problem: Patient/Family Goals  Goal: Patient/Family Long Term Goal  Description: Patient's Long Term Goal:     Interventions:  - See additional Care Plan goals for specific interventions  Outcome: Progressing  Goal: Patient/Family Short Term Goal  Description: Patient's Short Term Goal:     Interventions:   - See additional Care Plan goals for specific interventions  Outcome: Progressing

## 2025-05-26 NOTE — PROGRESS NOTES
[facility]  Gastroenterology Progress Note    Aldo Valdez Patient Status:  Inpatient    1945 MRN [id number]   Location [facility] 2W/SW Attending Noe Liz DO   Hosp Day # 4 PCP Wyatt Ross DO     Subjective:  Aldo Valdez is a(n) 79 year old male.    Current complaints:   Extubated yesterday.  Confused.          Objective:  Blood pressure 153/77, pulse 95, temperature 99.4 °F (37.4 °C), temperature source Temporal, resp. rate (!) 27, height 5' 9\" (1.753 m), weight 151 lb 14.4 oz (68.9 kg), SpO2 99%.  Respiratory: Decreased breath sounds  CV: Irregular  Abdomen: nondistended, soft, nontender  Extremities: no calf tenderness  Intubated awake, responds to commands    Labs:   Recent Labs   Lab 25  1424 25  1643 25  0527 25  1159 25  0236 25  0950 25  1157 25  0033 25  0544 25  0439   WBC 4.6  --  4.8  --   --  4.4  --   --  8.3 4.9   HGB 7.3*   < > 8.6*   < > 8.4* 9.5*   < > 8.5* 8.6* 7.0*   HCT 23.6*   < > 26.8*  --   --  29.2*  --   --  26.4* 22.4*   .0  --  257.0  --   --  287.0  --   --  280.0 270.0   CREATSERUM 2.05*  --  1.72*  --  1.59*  1.59*  --   --   --  1.99* 1.83*   BUN 40*  --  43*  --  32*  32*  --   --   --  45* 47*     --  142  --  143  143  --   --   --  143 147*   K 5.1  --  4.3  --  3.5  3.5  --   --   --  4.9 4.6     --  119*  --  117*  117*  --   --   --  114* 118*   CO2 14.0*  --  16.0*  --  15.0*  15.0*  --   --   --  20.0* 20.0*   *  --  90  --  226*  226*  --   --   --  229* 145*   CA 8.9  --  8.0*  --  6.7*  6.7*  --   --   --  8.2* 8.1*   ALB 3.7  --  2.9*  --  2.4*  --   --   --  3.2 3.2   ALKPHO 54  --  41*  --  35*  --   --   --  46 41*   BILT 0.7  --  0.5  --  0.5  --   --   --  0.6 0.8   TP 7.3  --  5.8  --  5.0*  --   --   --  6.2 6.2   AST 15  --  12  --  39*  --   --   --  38* 27   ALT 14  --  9*  --  23  --   --   --  22 19   PTT 48.8*  --   --   --   --   --    --   --   --   --    INR 2.17*  --  1.47*  --   --   --   --   --   --   --    PTP 25.3*  --  18.6*  --   --   --   --   --   --   --     < > = values in this interval not displayed.       No results for input(s): \"YOHANA\", \"LIP\", \"GGT\", \"PSA\", \"DDIMER\", \"ESRML\", \"ESRPF\", \"CRP\", \"BNP\", \"TROP\", \"CK\", \"CKMB\", \"JAKOB\", \"RPR\", \"B12\", \"ETOH\", \"POCGLU\" in the last 168 hours.    Invalid input(s): \"RF\"     Imaging:  XR CHEST AP PORTABLE  (CPT=71045)  Result Date: 5/26/2025  CONCLUSION:  1. Cardiomegaly with stable pulmonary edema consistent with mild CHF/fluid overload. 2. Endotracheal and enteric tubes have been removed.    Dictated by (CST): Avtar Yo MD on 5/26/2025 at 7:21 AM     Finalized by (CST): Avtar Yo MD on 5/26/2025 at 7:22 AM          CT BRAIN OR HEAD (CPT=70450)  Result Date: 5/25/2025  CONCLUSION:  1. No acute intracranial finding. 2. 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 3. Moderate changes of chronic small vessel disease in cerebral white matter. 4. Chronic left cerebellar lacunar infarct related to small vessel disease. 5. Large vessel intracranial atherosclerosis. 6. Evaluation degraded by patient related motion artifact.    Dictated by (CST): Tam Marcelo MD on 5/25/2025 at 2:12 PM     Finalized by (CST): Tam Marcelo MD on 5/25/2025 at 2:17 PM          XR CHEST AP PORTABLE  (CPT=71045)  Result Date: 5/25/2025  PROCEDURE: XR CHEST AP PORTABLE  (CPT=71045) TIME: 5:24 a.m..   COMPARISON: [facility], XR CHEST AP PORTABLE (CPT=71045), 5/24/2025, 1:19 AM.  INDICATIONS: Follow up shortness of breath.  TECHNIQUE:   Single view.   FINDINGS/IMPRESSION:    1. There is an endotracheal tube with tip approximately 4 cm above the gaston.  There is enteric feeding tube with tip overlying the projected position of the gastric fundus.  2. The heart mediastinal structures are enlarged.  Pulmonary vascularity is slightly increased.  The findings are  suggestive of slight fluid overload.  3. Lung volumes are diminished.  There is no dense consolidation or sizable effusion.    Dictated by (CST): Aj Carias MD on 5/25/2025 at 9:23 AM     Finalized by (CST): Aj Carias MD on 5/25/2025 at 9:24 AM          XR CHEST AP PORTABLE  (CPT=71045)  Result Date: 5/24/2025  CONCLUSION:  1. Endotracheal and enteric tubes are in customary positioning. 2. Development of mild retrocardiac opacity, which could relate to left basilar consolidation or atelectasis. 3. Stable cardiomegaly.   A preliminary report was issued by the [email or web address] teleradiology service. There are no major discrepancies.  [facility]  Dictated by (CST): Avtar Yo MD on 5/24/2025 at 8:10 AM     Finalized by (CST): Avtar Yo MD on 5/24/2025 at 8:14 AM             Problem list:  Problem List[1]    Assessment/Plan:    Acute GI bleeding  Acute blood loss anemia.   Rectal bleeding  - Status post EGD colonoscopy Thursday evening.  EGD negative.   -Colonoscopy 5/22/25 with diverticulosis and large pedunculated polyp in the rectosigmoid region that was fully removed with hot cautery snare.  The recent bleeding was felt to be more likely diverticular without culprit lesion identified.  Cannot completely rule out the large polyp contributing in the setting of anticoagulation however this was felt to be less likely.  - No further bleeding has been noted since endoscopy.  -hgb decreased today, however no signs of bleeding.  Not sure the reliability of this result.      4.   Cardiac arrest 2 days ago  - Etiology unclear.  No signs of further bleeding.  ? Angioedema          Recs:  Monitor H&H and transfuse as necessary  Continue supportive care  3.   No further GI intervention at this time  4.   Follow-up pulmonary and critical care recommendations  5.   Recheck hgb later today.    6.   Hold anticoagulation for 3 more days given the recent severe bleeding.      Cesar Byrne MD            [1]   Patient Active Problem List  Diagnosis    Essential hypertension    Back pain    Pure hypercholesterolemia    Cerebral infarction involving posterior cerebral artery, right (HCC)    Anxiety    Acute gastrointestinal hemorrhage    Tremor    Mixed Alzheimer and vascular dementia (HCC)

## 2025-05-26 NOTE — PROGRESS NOTES
[facility] Hospitalist Progress Note     CC: Hospital Follow up    PCP: Wyatt Ross DO       Assessment/Plan:   This is a 79-year-old male with history of dementia, stroke, atrial fibrillation on Eliquis, hypertension, type 2 diabetes, CKD, who presents to the hospital for evaluation of rectal bleeding.  S/p EGD/C-scope 5/23/25.      5/24/25 CODE BLUE was called for increased secretions with tongue swelling and bradycardia which led to asystolic arrest.  CPR was initiated and patient was intubated. Extubated 5/25/25.      Angioedema  - Tongue swelling with increased secretions  - Requiring intubation 5/24/2025  - IV steroids and Benadryl started  - Full vent support complete  - Critical care on consult  - ABG reviewed  - Hold lisinopril going forward  - Extubated 5/25/25 now on RA    Tremors   - intention tremors, unclear if new  - off sedation 5/25/25  - CTH no acute process   - Neuro consulted, discussed with no further work up needed     Rectal bleeding   Hypotension, likely had component of hemorrhagic shock on admission   Acute blood loss anemia  High suspicion for diverticular hemorrhage  - 2 units of PRBC given stat in ER  - Kcentra infusion in ER  - CTA abdomen and pelvis with active extravasation in sigmoid   - GI consulted   - underwent colonoscopy urgently on admission to attempt to clip/stop bleed  - S/p EGD/C-scope 5/23/25 Colonoscopy with diverticulosis and large pedunculated polyp in the rectosigmoid region that was fully removed with hot cautery snare, bleeding was felt to be more likely diverticular without culprit lesion identified   - PPI IV   - if further bleeding may need IR involvement   - serial Hemoglobin monitoring     Atrial fibrillation  - hold eliquis  - takes diltiazem 180mg BID, and toprol 50mg daily   - dilt drip   - cards consulted   - Echo with EF 55-60%  - resume AC in 2 days if Hg stable      DM  - A1c 5.9, ISS while on steroids     ZOE on CKD  Acidosis   Hypernatremia    - Cr down trending   - Likely due to decreased perfusion from bradycardic episode with cardiac arrest  - Bicarb improving  - 0.9 switched to D5 0.9  - trend bmp     Dementia  - resides at the [facility] [place]     Fluids: hold for now  Diet: NPO  DVT prophylaxis: hold chemical prophy     Edward Fitch [facility] Hospitalist        Subjective:     On room air and off restraints  Confused  Likely at baseline    OBJECTIVE:    Blood pressure 153/77, pulse 95, temperature 99.4 °F (37.4 °C), temperature source Temporal, resp. rate (!) 27, height 5' 9\" (1.753 m), weight 151 lb 14.4 oz (68.9 kg), SpO2 99%.    Temp:  [97.9 °F (36.6 °C)-99.8 °F (37.7 °C)] 99.4 °F (37.4 °C)  Pulse:  [] 95  Resp:  [13-28] 27  BP: ()/() 153/77  SpO2:  [92 %-100 %] 99 %      Intake/Output:    Intake/Output Summary (Last 24 hours) at 5/26/2025 1103  Last data filed at 5/26/2025 1021  Gross per 24 hour   Intake 2210.5 ml   Output 1100 ml   Net 1110.5 ml       Last 3 Weights   05/25/25 0200 151 lb 14.4 oz (68.9 kg)   05/22/25 2238 157 lb 6.5 oz (71.4 kg)   05/22/25 1426 180 lb (81.6 kg)   10/08/24 1853 180 lb (81.6 kg)   12/18/19 0857 188 lb (85.3 kg)       /77 (BP Location: Right arm)   Pulse 95   Temp 99.4 °F (37.4 °C) (Temporal)   Resp (!) 27   Ht 5' 9\" (1.753 m)   Wt 151 lb 14.4 oz (68.9 kg)   SpO2 99%   BMI 22.43 kg/m²   General: Alert, not fully oriented  Lungs: Good air movement bilaterally  Heart: Irregularly irregular  Abdomen: soft, non tender  Extremities: No edema  Neuro: Following commands.    Data Review:       Labs:     Recent Labs   Lab 05/24/25  0950 05/24/25  1157 05/25/25  0033 05/25/25  0544 05/26/25  0439   RBC 3.66*  --   --  3.24* 2.68*   HGB 9.5*   < > 8.5* 8.6* 7.0*   HCT 29.2*  --   --  26.4* 22.4*   MCV 79.8*  --   --  81.5 83.6   MCH 26.0  --   --  26.5 26.1   MCHC 32.5  --   --  32.6 31.3   RDW 16.8*  --   --  17.4* 18.0*   NEPRELIM 3.58  --   --  6.87 3.38   WBC 4.4   --   --  8.3 4.9   .0  --   --  280.0 270.0    < > = values in this interval not displayed.         Recent Labs   Lab 05/24/25  0236 05/25/25  0544 05/26/25  0439   *  226* 229* 145*   BUN 32*  32* 45* 47*   CREATSERUM 1.59*  1.59* 1.99* 1.83*   EGFRCR 44*  44* 34* 38*   CA 6.7*  6.7* 8.2* 8.1*     143 143 147*   K 3.5  3.5 4.9 4.6   *  117* 114* 118*   CO2 15.0*  15.0* 20.0* 20.0*       Recent Labs   Lab 05/22/25  1424 05/23/25  0527 05/24/25 0236 05/25/25  0544 05/26/25  0439   ALT 14 9* 23 22 19   AST 15 12 39* 38* 27   ALB 3.7 2.9* 2.4* 3.2 3.2         Imaging:  XR CHEST AP PORTABLE  (CPT=71045)  Result Date: 5/26/2025  CONCLUSION:  1. Cardiomegaly with stable pulmonary edema consistent with mild CHF/fluid overload. 2. Endotracheal and enteric tubes have been removed.    Dictated by (CST): Avtar Yo MD on 5/26/2025 at 7:21 AM     Finalized by (CST): Avtar Yo MD on 5/26/2025 at 7:22 AM          CT BRAIN OR HEAD (CPT=70450)  Result Date: 5/25/2025  CONCLUSION:  1. No acute intracranial finding. 2. 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 3. Moderate changes of chronic small vessel disease in cerebral white matter. 4. Chronic left cerebellar lacunar infarct related to small vessel disease. 5. Large vessel intracranial atherosclerosis. 6. Evaluation degraded by patient related motion artifact.    Dictated by (CST): Tam Marcelo MD on 5/25/2025 at 2:12 PM     Finalized by (CST): Tam Marcelo MD on 5/25/2025 at 2:17 PM          XR CHEST AP PORTABLE  (CPT=71045)  Result Date: 5/25/2025  PROCEDURE: XR CHEST AP PORTABLE  (CPT=71045) TIME: 5:24 a.m..   COMPARISON: [facility], XR CHEST AP PORTABLE (CPT=71045), 5/24/2025, 1:19 AM.  INDICATIONS: Follow up shortness of breath.  TECHNIQUE:   Single view.   FINDINGS/IMPRESSION:    1. There is an endotracheal tube with tip approximately 4 cm above the gaston.  There is  enteric feeding tube with tip overlying the projected position of the gastric fundus.  2. The heart mediastinal structures are enlarged.  Pulmonary vascularity is slightly increased.  The findings are suggestive of slight fluid overload.  3. Lung volumes are diminished.  There is no dense consolidation or sizable effusion.    Dictated by (CST): Aj Carias MD on 5/25/2025 at 9:23 AM     Finalized by (CST): Aj Carias MD on 5/25/2025 at 9:24 AM          XR CHEST AP PORTABLE  (CPT=71045)  Result Date: 5/24/2025  CONCLUSION:  1. Endotracheal and enteric tubes are in customary positioning. 2. Development of mild retrocardiac opacity, which could relate to left basilar consolidation or atelectasis. 3. Stable cardiomegaly.   A preliminary report was issued by the [facility]iology teleradiology service. There are no major discrepancies.  [facility]  Dictated by (CST): Avtar Yo MD on 5/24/2025 at 8:10 AM     Finalized by (CST): Avtar Yo MD on 5/24/2025 at 8:14 AM              Meds:     Scheduled Medications[1]  Medication Infusions[2]  PRN Medications[3]                 [1]    insulin aspart  1-5 Units Subcutaneous TID CC    methylPREDNISolone  40 mg Intravenous Daily    diphenhydrAMINE  25 mg Intravenous Daily    [Held by provider] dilTIAZem  30 mg Oral 4 times per day    pantoprazole  40 mg Intravenous Daily    metoprolol tartrate  25 mg Oral 2x Daily(Beta Blocker)   [2]    dextrose 5%-sodium chloride 0.45% 75 mL/hr at 05/26/25 0603    dilTIAZem 15 mg/hr (05/26/25 0800)   [3]   LORazepam    dilTIAZem    acetaminophen    ondansetron    metoclopramide    glucose **OR** glucose **OR** glucose-vitamin C **OR** dextrose **OR** glucose **OR** glucose **OR** glucose-vitamin C

## 2025-05-27 NOTE — CM/SW NOTE
PT/OT recommending home health (on 5/23). Patient is from [place] at [facility]. Attempted to speak w/ patient's spouse but phone is not working. He has a guardian through [facility] (Juanito [phone]). L/m for Juanito regarding discharge planning needs.    Discussed patient w/ Jeanette RN, patient has declined over the last few days. Plan for PT/OT to re-evaluate, anticipate patient will need SNF.    CELINE De La Cruz [id number]

## 2025-05-27 NOTE — PLAN OF CARE
Problem: Patient Centered Care  Goal: Patient preferences are identified and integrated in the patient's plan of care  Description: Interventions:  - What would you like us to know as we care for you? \"Find my badge, I'm a retired .\"  Problem: Delirium  Goal: Minimize duration of delirium  Description: Interventions:  - Encourage use of hearing aids, eye glasses  - Promote highest level of mobility daily  - Provide frequent reorientation  - Promote wakefulness i.e. lights on, blinds open  - Promote sleep, encourage patient's normal rest cycle i.e. lights off, TV off, minimize noise and interruptions  - Encourage family to assist in orientation and promotion of home routines  Outcome: Progressing     - Provide timely, complete, and accurate information to patient/family  - Incorporate patient and family knowledge, values, beliefs, and cultural backgrounds into the planning and delivery of care  - Encourage patient/family to participate in care and decision-making at the level they choose  - Honor patient and family perspectives and choices  Outcome: Progressing  Problem: CARDIOVASCULAR - ADULT  Goal: Maintains optimal cardiac output and hemodynamic stability  Description: INTERVENTIONS:  - Monitor vital signs, rhythm, and trends  - Monitor for bleeding, hypotension and signs of decreased cardiac output  - Evaluate effectiveness of vasoactive medications to optimize hemodynamic stability  - Monitor arterial and/or venous puncture sites for bleeding and/or hematoma  - Assess quality of pulses, skin color and temperature  - Assess for signs of decreased coronary artery perfusion - ex. Angina  - Evaluate fluid balance, assess for edema, trend weights  Outcome: Progressing  Goal: Absence of cardiac arrhythmias or at baseline  Description: INTERVENTIONS:  - Continuous cardiac monitoring, monitor vital signs, obtain 12 lead EKG if indicated  - Evaluate effectiveness of antiarrhythmic and heart rate control  medications as ordered  - Initiate emergency measures for life threatening arrhythmias  - Monitor electrolytes and administer replacement therapy as ordered  Outcome: Progressing     Problem: Diabetes/Glucose Control  Goal: Glucose maintained within prescribed range  Description: INTERVENTIONS:  - Monitor Blood Glucose as ordered  - Assess for signs and symptoms of hyperglycemia and hypoglycemia  - Administer ordered medications to maintain glucose within target range  - Assess barriers to adequate nutritional intake and initiate nutrition consult as needed  - Instruct patient on self management of diabetes  Outcome: Progressing     Problem: GASTROINTESTINAL - ADULT  Goal: Minimal or absence of nausea and vomiting  Description: INTERVENTIONS:  - Maintain adequate hydration with IV or PO as ordered and tolerated  - Nasogastric tube to low intermittent suction as ordered  - Evaluate effectiveness of ordered antiemetic medications  - Provide nonpharmacologic comfort measures as appropriate  - Advance diet as tolerated, if ordered  - Obtain nutritional consult as needed  - Evaluate fluid balance  Outcome: Progressing  Goal: Maintains or returns to baseline bowel function  Description: INTERVENTIONS:  - Assess bowel function  - Maintain adequate hydration with IV or PO as ordered and tolerated  - Evaluate effectiveness of GI medications  - Encourage mobilization and activity  - Obtain nutritional consult as needed  - Establish a toileting routine/schedule  - Consider collaborating with pharmacy to review patient's medication profile  Outcome: Progressing     Problem: HEMATOLOGIC - ADULT  Goal: Maintains hematologic stability  Description: INTERVENTIONS  - Assess for signs and symptoms of bleeding or hemorrhage  - Monitor labs and vital signs for trends  - Administer supportive blood products/factors, fluids and medications as ordered and appropriate  - Administer supportive blood products/factors as ordered and  appropriate  Outcome: Progressing  Goal: Free from bleeding injury  Description: (Example usage: patient with low platelets)  INTERVENTIONS:  - Avoid intramuscular injections, enemas and rectal medication administration  - Ensure safe mobilization of patient  - Hold pressure on venipuncture sites to achieve adequate hemostasis  - Assess for signs and symptoms of internal bleeding  - Monitor lab trends  - Patient is to report abnormal signs of bleeding to staff  - Avoid use of toothpicks and dental floss  - Use electric shaver for shaving  - Use soft bristle tooth brush  - Limit straining and forceful nose blowing  Outcome: Progressing     Problem: RESPIRATORY - ADULT  Goal: Achieves optimal ventilation and oxygenation  Description: INTERVENTIONS:  - Assess for changes in respiratory status  - Assess for changes in mentation and behavior  - Position to facilitate oxygenation and minimize respiratory effort  - Oxygen supplementation based on oxygen saturation or ABGs  - Provide Smoking Cessation handout, if applicable  - Encourage broncho-pulmonary hygiene including cough, deep breathe, Incentive Spirometry  - Assess the need for suctioning and perform as needed  - Assess and instruct to report SOB or any respiratory difficulty  - Respiratory Therapy support as indicated  - Manage/alleviate anxiety  - Monitor for signs/symptoms of CO2 retention  Outcome: Progressing     Problem: GENITOURINARY - ADULT  Goal: Absence of urinary retention  Description: INTERVENTIONS:  - Assess patient’s ability to void and empty bladder  - Monitor intake/output and perform bladder scan as needed  - Follow urinary retention protocol/standard of care  - Consider collaborating with pharmacy to review patient's medication profile  - Implement strategies to promote bladder emptying  Outcome: Progressing     Problem: METABOLIC/FLUID AND ELECTROLYTES - ADULT  Goal: Glucose maintained within prescribed range  Description: INTERVENTIONS:  -  Monitor Blood Glucose as ordered  - Assess for signs and symptoms of hyperglycemia and hypoglycemia  - Administer ordered medications to maintain glucose within target range  - Assess barriers to adequate nutritional intake and initiate nutrition consult as needed  - Instruct patient on self management of diabetes  Outcome: Progressing  Goal: Electrolytes maintained within normal limits  Description: INTERVENTIONS:  - Monitor labs and rhythm and assess patient for signs and symptoms of electrolyte imbalances  - Administer electrolyte replacement as ordered  - Monitor response to electrolyte replacements, including rhythm and repeat lab results as appropriate  - Fluid restriction as ordered  - Instruct patient on fluid and nutrition restrictions as appropriate  Outcome: Progressing  Goal: Hemodynamic stability and optimal renal function maintained  Description: INTERVENTIONS:  - Monitor labs and assess for signs and symptoms of volume excess or deficit  - Monitor intake, output and patient weight  - Monitor urine specific gravity, serum osmolarity and serum sodium as indicated or ordered  - Monitor response to interventions for patient's volume status, including labs, urine output, blood pressure (other measures as available)  - Encourage oral intake as appropriate  - Instruct patient on fluid and nutrition restrictions as appropriate  Outcome: Progressing     Problem: SKIN/TISSUE INTEGRITY - ADULT  Goal: Skin integrity remains intact  Description: INTERVENTIONS  - Assess and document risk factors for pressure ulcer development  - Assess and document skin integrity  - Monitor for areas of redness and/or skin breakdown  - Initiate interventions, skin care algorithm/standards of care as needed  Outcome: Progressing     Problem: MUSCULOSKELETAL - ADULT  Goal: Return mobility to safest level of function  Description: INTERVENTIONS:  - Assess patient stability and activity tolerance for standing, transferring and  ambulating w/ or w/o assistive devices  - Assist with transfers and ambulation using safe patient handling equipment as needed  - Ensure adequate protection for wounds/incisions during mobilization  - Obtain PT/OT consults as needed  - Advance activity as appropriate  - Communicate ordered activity level and limitations with patient/family  Outcome: Progressing  Goal: Maintain proper alignment of affected body part  Description: INTERVENTIONS:  - Support and protect limb and body alignment per provider's orders  - Instruct and reinforce with patient and family use of appropriate assistive device and precautions (e.g. spinal or hip dislocation precautions)  Outcome: Progressing     Problem: NEUROLOGICAL - ADULT  Goal: Achieves stable or improved neurological status  Description: INTERVENTIONS  - Assess for and report changes in neurological status  - Initiate measures to prevent increased intracranial pressure  - Maintain blood pressure and fluid volume within ordered parameters to optimize cerebral perfusion and minimize risk of hemorrhage  - Monitor temperature, glucose, and sodium. Initiate appropriate interventions as ordered  Outcome: Progressing    Saida Hahn RN, BSN, CCRN

## 2025-05-27 NOTE — PROGRESS NOTES
[facility] Hospitalist Progress Note     CC: Hospital Follow up    PCP: Wyatt Ross DO       Assessment/Plan:   This is a 79-year-old male with history of dementia, stroke, atrial fibrillation on Eliquis, hypertension, type 2 diabetes, CKD, who presents to the hospital for evaluation of rectal bleeding.  S/p EGD/C-scope 5/23/25.      5/24/25 CODE BLUE was called for increased secretions with tongue swelling and bradycardia which led to asystolic arrest.  CPR was initiated and patient was intubated. Extubated 5/25/25.      Angioedema  - Tongue swelling with increased secretions  - Requiring intubation 5/24/2025  - IV steroids and Benadryl complete   - Full vent support complete  - Critical care on consult  - ABG reviewed  - Hold lisinopril going forward  - Extubated 5/25/25 now on RA    Tremors   - intention tremors, unclear if new  - off sedation 5/25/25  - CTH no acute process   - Neuro consulted, discussed with no further work up needed     Rectal bleeding   Hypotension, likely had component of hemorrhagic shock on admission   Acute blood loss anemia  High suspicion for diverticular hemorrhage  - 2 units of PRBC given stat in ER  - Kcentra infusion in ER  - CTA abdomen and pelvis with active extravasation in sigmoid   - GI consulted   - underwent colonoscopy urgently on admission to attempt to clip/stop bleed  - S/p EGD/C-scope 5/23/25 Colonoscopy with diverticulosis and large pedunculated polyp in the rectosigmoid region that was fully removed with hot cautery snare, bleeding was felt to be more likely diverticular without culprit lesion identified   - PPI IV   - if further bleeding may need IR involvement   - serial Hemoglobin monitoring     Atrial fibrillation  - hold eliquis  - takes diltiazem 180mg BID, and toprol 50mg daily   - dilt drip   - cards consulted   - Echo with EF 55-60%  - resume AC in 1-2 days if Hg stable      DM  - A1c 5.9, ISS while on steroids     ZOE on CKD  Acidosis    Hypernatremia   - Cr down trending   - Likely due to decreased perfusion from bradycardic episode with cardiac arrest  - Bicarb improving  - 0.9 switched to D5 0.9, now D5  - trend bmp     Dementia  - resides at the [facility] [place]     Fluids: D5  Diet: modified   DVT prophylaxis: hold chemical prophy     Dispo: can transfer to cardiac tele     Edward Pa MD   [facility] Hospitalist        Subjective:     Back on 5L today   Confused  Likely at baseline  Off restraints     OBJECTIVE:    Blood pressure 142/55, pulse 103, temperature 98.6 °F (37 °C), temperature source Temporal, resp. rate 26, height 5' 9\" (1.753 m), weight 156 lb 8.4 oz (71 kg), SpO2 94%.    Temp:  [98 °F (36.7 °C)-99.2 °F (37.3 °C)] 98.6 °F (37 °C)  Pulse:  [] 103  Resp:  [19-28] 26  BP: (121-160)/() 142/55  SpO2:  [88 %-100 %] 94 %      Intake/Output:    Intake/Output Summary (Last 24 hours) at 5/27/2025 1124  Last data filed at 5/27/2025 0600  Gross per 24 hour   Intake 2819.75 ml   Output 4150 ml   Net -1330.25 ml       Last 3 Weights   05/27/25 0400 156 lb 8.4 oz (71 kg)   05/25/25 0200 151 lb 14.4 oz (68.9 kg)   05/22/25 2238 157 lb 6.5 oz (71.4 kg)   05/22/25 1426 180 lb (81.6 kg)   10/08/24 1853 180 lb (81.6 kg)   12/18/19 0857 188 lb (85.3 kg)       /55 (BP Location: Right arm)   Pulse 103   Temp 98.6 °F (37 °C) (Temporal)   Resp 26   Ht 5' 9\" (1.753 m)   Wt 156 lb 8.4 oz (71 kg)   SpO2 94%   BMI 23.11 kg/m²     General: Alert, not fully oriented  Lungs: Good air movement bilaterally  Heart: Irregularly irregular  Abdomen: soft, non tender  Extremities: No edema  Neuro: Following commands.    Data Review:       Labs:     Recent Labs   Lab 05/25/25  0544 05/26/25  0439 05/27/25  0831   RBC 3.24* 2.68* 2.89*   HGB 8.6* 7.0* 7.5*   HCT 26.4* 22.4* 23.7*   MCV 81.5 83.6 82.0   MCH 26.5 26.1 26.0   MCHC 32.6 31.3 31.6   RDW 17.4* 18.0* 17.8*   NEPRELIM 6.87 3.38 5.20   WBC 8.3 4.9 6.8   .0  270.0 286.0         Recent Labs   Lab 05/26/25  0439 05/26/25  1453 05/27/25  0628   * 173* 190*   BUN 47* 45* 48*   CREATSERUM 1.83* 1.76* 1.69*   EGFRCR 38* 39* 41*   CA 8.1* 8.0* 7.8*   * 149* 148*   K 4.6 4.7 4.2   * 118* 116*   CO2 20.0* 19.0* 21.0       Recent Labs   Lab 05/22/25  1424 05/23/25  0527 05/24/25  0236 05/25/25  0544 05/26/25  0439   ALT 14 9* 23 22 19   AST 15 12 39* 38* 27   ALB 3.7 2.9* 2.4* 3.2 3.2         Imaging:  XR CHEST AP PORTABLE  (CPT=71045)  Result Date: 5/27/2025  CONCLUSION:  Cardiomegaly and stable findings consistent with mild CHF or fluid overload.     Dictated by (CST): Avtar Yo MD on 5/27/2025 at 7:13 AM     Finalized by (CST): Avtar Yo MD on 5/27/2025 at 7:14 AM          XR CHEST AP PORTABLE  (CPT=71045)  Result Date: 5/26/2025  CONCLUSION:  1. Cardiomegaly with stable pulmonary edema consistent with mild CHF/fluid overload. 2. Endotracheal and enteric tubes have been removed.    Dictated by (CST): Avtar Yo MD on 5/26/2025 at 7:21 AM     Finalized by (CST): Avtar Yo MD on 5/26/2025 at 7:22 AM          CT BRAIN OR HEAD (CPT=70450)  Result Date: 5/25/2025  CONCLUSION:  1. No acute intracranial finding. 2. 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 3. Moderate changes of chronic small vessel disease in cerebral white matter. 4. Chronic left cerebellar lacunar infarct related to small vessel disease. 5. Large vessel intracranial atherosclerosis. 6. Evaluation degraded by patient related motion artifact.    Dictated by (CST): Tam Marcelo MD on 5/25/2025 at 2:12 PM     Finalized by (CST): Tam Marcelo MD on 5/25/2025 at 2:17 PM          XR CHEST AP PORTABLE  (CPT=71045)  Result Date: 5/25/2025  PROCEDURE: XR CHEST AP PORTABLE  (CPT=71045) TIME: 5:24 a.m..   COMPARISON: [facility], XR CHEST AP PORTABLE (CPT=71045), 5/24/2025, 1:19 AM.  INDICATIONS: Follow up shortness of  breath.  TECHNIQUE:   Single view.   FINDINGS/IMPRESSION:    1. There is an endotracheal tube with tip approximately 4 cm above the gaston.  There is enteric feeding tube with tip overlying the projected position of the gastric fundus.  2. The heart mediastinal structures are enlarged.  Pulmonary vascularity is slightly increased.  The findings are suggestive of slight fluid overload.  3. Lung volumes are diminished.  There is no dense consolidation or sizable effusion.    Dictated by (CST): Aj Carias MD on 5/25/2025 at 9:23 AM     Finalized by (CST): Aj Carias MD on 5/25/2025 at 9:24 AM          Meds:     Scheduled Medications[1]  Medication Infusions[2]  PRN Medications[3]                 [1]    insulin aspart  1-5 Units Subcutaneous TID CC    [Held by provider] dilTIAZem  30 mg Oral 4 times per day    pantoprazole  40 mg Intravenous Daily    metoprolol tartrate  25 mg Oral 2x Daily(Beta Blocker)   [2]    dextrose 83 mL/hr at 05/27/25 0912    dilTIAZem 10 mg/hr (05/27/25 0700)   [3]   LORazepam    dilTIAZem    acetaminophen    ondansetron    metoclopramide    glucose **OR** glucose **OR** glucose-vitamin C **OR** dextrose **OR** glucose **OR** glucose **OR** glucose-vitamin C

## 2025-05-27 NOTE — PLAN OF CARE
Problem: Diabetes/Glucose Control  Goal: Glucose maintained within prescribed range  Description: INTERVENTIONS:  - Monitor Blood Glucose as ordered  - Assess for signs and symptoms of hyperglycemia and hypoglycemia  - Administer ordered medications to maintain glucose within target range  - Assess barriers to adequate nutritional intake and initiate nutrition consult as needed  - Instruct patient on self management of diabetes  Outcome: Progressing     Problem: CARDIOVASCULAR - ADULT  Goal: Maintains optimal cardiac output and hemodynamic stability  Description: INTERVENTIONS:  - Monitor vital signs, rhythm, and trends  - Monitor for bleeding, hypotension and signs of decreased cardiac output  - Evaluate effectiveness of vasoactive medications to optimize hemodynamic stability  - Monitor arterial and/or venous puncture sites for bleeding and/or hematoma  - Assess quality of pulses, skin color and temperature  - Assess for signs of decreased coronary artery perfusion - ex. Angina  - Evaluate fluid balance, assess for edema, trend weights  Outcome: Progressing  Goal: Absence of cardiac arrhythmias or at baseline  Description: INTERVENTIONS:  - Continuous cardiac monitoring, monitor vital signs, obtain 12 lead EKG if indicated  - Evaluate effectiveness of antiarrhythmic and heart rate control medications as ordered  - Initiate emergency measures for life threatening arrhythmias  - Monitor electrolytes and administer replacement therapy as ordered  Outcome: Not Progressing     Problem: GASTROINTESTINAL - ADULT  Goal: Minimal or absence of nausea and vomiting  Description: INTERVENTIONS:  - Maintain adequate hydration with IV or PO as ordered and tolerated  - Nasogastric tube to low intermittent suction as ordered  - Evaluate effectiveness of ordered antiemetic medications  - Provide nonpharmacologic comfort measures as appropriate  - Advance diet as tolerated, if ordered  - Obtain nutritional consult as needed  -  Evaluate fluid balance  Outcome: Progressing  Goal: Maintains or returns to baseline bowel function  Description: INTERVENTIONS:  - Assess bowel function  - Maintain adequate hydration with IV or PO as ordered and tolerated  - Evaluate effectiveness of GI medications  - Encourage mobilization and activity  - Obtain nutritional consult as needed  - Establish a toileting routine/schedule  - Consider collaborating with pharmacy to review patient's medication profile  Outcome: Not Progressing     Problem: HEMATOLOGIC - ADULT  Goal: Maintains hematologic stability  Description: INTERVENTIONS  - Assess for signs and symptoms of bleeding or hemorrhage  - Monitor labs and vital signs for trends  - Administer supportive blood products/factors, fluids and medications as ordered and appropriate  - Administer supportive blood products/factors as ordered and appropriate  Outcome: Progressing  Goal: Free from bleeding injury  Description: (Example usage: patient with low platelets)  INTERVENTIONS:  - Avoid intramuscular injections, enemas and rectal medication administration  - Ensure safe mobilization of patient  - Hold pressure on venipuncture sites to achieve adequate hemostasis  - Assess for signs and symptoms of internal bleeding  - Monitor lab trends  - Patient is to report abnormal signs of bleeding to staff  - Avoid use of toothpicks and dental floss  - Use electric shaver for shaving  - Use soft bristle tooth brush  - Limit straining and forceful nose blowing  Outcome: Not Progressing     Problem: RESPIRATORY - ADULT  Goal: Achieves optimal ventilation and oxygenation  Description: INTERVENTIONS:  - Assess for changes in respiratory status  - Assess for changes in mentation and behavior  - Position to facilitate oxygenation and minimize respiratory effort  - Oxygen supplementation based on oxygen saturation or ABGs  - Provide Smoking Cessation handout, if applicable  - Encourage broncho-pulmonary hygiene including cough,  deep breathe, Incentive Spirometry  - Assess the need for suctioning and perform as needed  - Assess and instruct to report SOB or any respiratory difficulty  - Respiratory Therapy support as indicated  - Manage/alleviate anxiety  - Monitor for signs/symptoms of CO2 retention  Outcome: Progressing     Problem: GENITOURINARY - ADULT  Goal: Absence of urinary retention  Description: INTERVENTIONS:  - Assess patient’s ability to void and empty bladder  - Monitor intake/output and perform bladder scan as needed  - Follow urinary retention protocol/standard of care  - Consider collaborating with pharmacy to review patient's medication profile  - Implement strategies to promote bladder emptying  Outcome: Progressing     Problem: METABOLIC/FLUID AND ELECTROLYTES - ADULT  Goal: Glucose maintained within prescribed range  Description: INTERVENTIONS:  - Monitor Blood Glucose as ordered  - Assess for signs and symptoms of hyperglycemia and hypoglycemia  - Administer ordered medications to maintain glucose within target range  - Assess barriers to adequate nutritional intake and initiate nutrition consult as needed  - Instruct patient on self management of diabetes  Outcome: Progressing  Goal: Electrolytes maintained within normal limits  Description: INTERVENTIONS:  - Monitor labs and rhythm and assess patient for signs and symptoms of electrolyte imbalances  - Administer electrolyte replacement as ordered  - Monitor response to electrolyte replacements, including rhythm and repeat lab results as appropriate  - Fluid restriction as ordered  - Instruct patient on fluid and nutrition restrictions as appropriate  Outcome: Progressing  Goal: Hemodynamic stability and optimal renal function maintained  Description: INTERVENTIONS:  - Monitor labs and assess for signs and symptoms of volume excess or deficit  - Monitor intake, output and patient weight  - Monitor urine specific gravity, serum osmolarity and serum sodium as indicated  or ordered  - Monitor response to interventions for patient's volume status, including labs, urine output, blood pressure (other measures as available)  - Encourage oral intake as appropriate  - Instruct patient on fluid and nutrition restrictions as appropriate  Outcome: Progressing     Problem: NEUROLOGICAL - ADULT  Goal: Achieves stable or improved neurological status  Description: INTERVENTIONS  - Assess for and report changes in neurological status  - Initiate measures to prevent increased intracranial pressure  - Maintain blood pressure and fluid volume within ordered parameters to optimize cerebral perfusion and minimize risk of hemorrhage  - Monitor temperature, glucose, and sodium. Initiate appropriate interventions as ordered  Outcome: Not Progressing     Problem: MUSCULOSKELETAL - ADULT  Goal: Return mobility to safest level of function  Description: INTERVENTIONS:  - Assess patient stability and activity tolerance for standing, transferring and ambulating w/ or w/o assistive devices  - Assist with transfers and ambulation using safe patient handling equipment as needed  - Ensure adequate protection for wounds/incisions during mobilization  - Obtain PT/OT consults as needed  - Advance activity as appropriate  - Communicate ordered activity level and limitations with patient/family  Outcome: Not Progressing  Goal: Maintain proper alignment of affected body part  Description: INTERVENTIONS:  - Support and protect limb and body alignment per provider's orders  - Instruct and reinforce with patient and family use of appropriate assistive device and precautions (e.g. spinal or hip dislocation precautions)  Outcome: Not Progressing

## 2025-05-27 NOTE — PROGRESS NOTES
[facility]  Gastroenterology Progress Note    Aldo Valdez Patient Status:  Inpatient    1945 MRN [id number]   Location [facility] 2W/SW Attending Noe Liz DO   Hosp Day # 5 PCP Wyatt Ross DO     Subjective:  Aldo Valdez is a(n) 79 year old male.    Current complaints:   No overt bleeding per RN.  Denies pain.        Objective:  Blood pressure 142/78, pulse 111, temperature 99 °F (37.2 °C), temperature source Temporal, resp. rate (!) 28, height 5' 9\" (1.753 m), weight 156 lb 8.4 oz (71 kg), SpO2 92%.  GENERAL: in no apparent distress  CHEST/CARDIO: RRR without murmur  LUNGS: clear to auscultation  GI: good BS's and no tenderness      Labs:   Recent Labs   Lab 25  1424 25  1643 25  0527 25  1159 25  0236 25  0950 25  0544 25  0439 25  1453 25  0628 25  0831   WBC 4.6  --  4.8  --   --    < > 8.3 4.9  --   --  6.8   HGB 7.3*   < > 8.6*   < > 8.4*   < > 8.6* 7.0*  --   --  7.5*   HCT 23.6*   < > 26.8*  --   --    < > 26.4* 22.4*  --   --  23.7*   .0  --  257.0  --   --    < > 280.0 270.0  --   --  286.0   CREATSERUM 2.05*  --  1.72*  --  1.59*  1.59*  --  1.99* 1.83* 1.76* 1.69*  --    BUN 40*  --  43*  --  32*  32*  --  45* 47* 45* 48*  --      --  142  --  143  143  --  143 147* 149* 148*  --    K 5.1  --  4.3  --  3.5  3.5  --  4.9 4.6 4.7 4.2  --      --  119*  --  117*  117*  --  114* 118* 118* 116*  --    CO2 14.0*  --  16.0*  --  15.0*  15.0*  --  20.0* 20.0* 19.0* 21.0  --    *  --  90  --  226*  226*  --  229* 145* 173* 190*  --    CA 8.9  --  8.0*  --  6.7*  6.7*  --  8.2* 8.1* 8.0* 7.8*  --    ALB 3.7  --  2.9*  --  2.4*  --  3.2 3.2  --   --   --    ALKPHO 54  --  41*  --  35*  --  46 41*  --   --   --    BILT 0.7  --  0.5  --  0.5  --  0.6 0.8  --   --   --    TP 7.3  --  5.8  --  5.0*  --  6.2 6.2  --   --   --    AST 15  --  12  --  39*  --  38* 27  --   --   --    ALT 14   --  9*  --  23  --  22 19  --   --   --    PTT 48.8*  --   --   --   --   --   --   --   --   --   --    INR 2.17*  --  1.47*  --   --   --   --   --   --   --   --    PTP 25.3*  --  18.6*  --   --   --   --   --   --   --   --     < > = values in this interval not displayed.       No results for input(s): \"YOHANA\", \"LIP\", \"GGT\", \"PSA\", \"DDIMER\", \"ESRML\", \"ESRPF\", \"CRP\", \"BNP\", \"TROP\", \"CK\", \"CKMB\", \"JAKOB\", \"RPR\", \"B12\", \"ETOH\", \"POCGLU\" in the last 168 hours.    Invalid input(s): \"RF\"     Imaging:  XR CHEST AP PORTABLE  (CPT=71045)  Result Date: 5/27/2025  CONCLUSION:  Cardiomegaly and stable findings consistent with mild CHF or fluid overload.     Dictated by (CST): Avtar Yo MD on 5/27/2025 at 7:13 AM     Finalized by (CST): Avtar Yo MD on 5/27/2025 at 7:14 AM          XR CHEST AP PORTABLE  (CPT=71045)  Result Date: 5/26/2025  CONCLUSION:  1. Cardiomegaly with stable pulmonary edema consistent with mild CHF/fluid overload. 2. Endotracheal and enteric tubes have been removed.    Dictated by (CST): Avtar Yo MD on 5/26/2025 at 7:21 AM     Finalized by (CST): Avtar Yo MD on 5/26/2025 at 7:22 AM          CT BRAIN OR HEAD (CPT=70450)  Result Date: 5/25/2025  CONCLUSION:  1. No acute intracranial finding. 2. 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 3. Moderate changes of chronic small vessel disease in cerebral white matter. 4. Chronic left cerebellar lacunar infarct related to small vessel disease. 5. Large vessel intracranial atherosclerosis. 6. Evaluation degraded by patient related motion artifact.    Dictated by (CST): Tam Marcelo MD on 5/25/2025 at 2:12 PM     Finalized by (CST): Tam Marcelo MD on 5/25/2025 at 2:17 PM          XR CHEST AP PORTABLE  (CPT=71045)  Result Date: 5/25/2025  PROCEDURE: XR CHEST AP PORTABLE  (CPT=71045) TIME: 5:24 a.m..   COMPARISON: [facility], XR CHEST AP PORTABLE (CPT=71045), 5/24/2025, 1:19 AM.   INDICATIONS: Follow up shortness of breath.  TECHNIQUE:   Single view.   FINDINGS/IMPRESSION:    1. There is an endotracheal tube with tip approximately 4 cm above the gaston.  There is enteric feeding tube with tip overlying the projected position of the gastric fundus.  2. The heart mediastinal structures are enlarged.  Pulmonary vascularity is slightly increased.  The findings are suggestive of slight fluid overload.  3. Lung volumes are diminished.  There is no dense consolidation or sizable effusion.    Dictated by (CST): Aj Carias MD on 5/25/2025 at 9:23 AM     Finalized by (CST): Aj Carias MD on 5/25/2025 at 9:24 AM             Problem list:  Problem List[1]    Assessment/Plan:    Acute GI bleeding  Acute blood loss anemia.   Rectal bleeding  - Status post EGD colonoscopy Thursday evening.  EGD negative.   -Colonoscopy 5/22/25 with diverticulosis and large pedunculated polyp in the rectosigmoid region that was fully removed with hot cautery snare.  The recent bleeding was felt to be more likely diverticular without culprit lesion identified.  Cannot completely rule out the large polyp contributing in the setting of anticoagulation however this was felt to be less likely.  - No further bleeding has been noted since endoscopy.  - Hgb has been stable.    4.   Cardiac arrest.  - Etiology unclear.  No signs of further bleeding.  ? Angioedema  - Now extubated    Recs:  Monitor H&H  Continue supportive care  3.   No further GI intervention at this time  4.   Follow-up pulmonary and critical care recommendations  6.   Hold anticoagulation for 2 more days given the recent severe bleeding.      Jas Lundberg M.D.  [facility]  Gastroenterology & Hepatology           [1]   Patient Active Problem List  Diagnosis    Essential hypertension    Back pain    Pure hypercholesterolemia    Cerebral infarction involving posterior cerebral artery, right (HCC)    Anxiety    Acute gastrointestinal hemorrhage     Tremor    Mixed Alzheimer and vascular dementia (HCC)

## 2025-05-27 NOTE — PROGRESS NOTES
[facility]  part of [facility]    Progress Note      Assessment and Plan:   1.  Cardiopulmonary arrest-the exact etiology is uncertain; however, I have concerns that the patient developed angioedema resulting in respiratory impairment contributing to subsequent bradycardia arrhythmia and asystolic arrest.  The chest x-ray suggests mild edema.  Altogether, doing much better in every regard.  Okay to floor transfer.    Recommendations:  Transfer to telemetry unit.  Will sign off.  Please call if we can help further.    2.  GI bleeding-no evidence that it is active at present.  Possibly diverticular.  Colon polyp removed.    Recommendations:  1.  Pepcid  2.  Serial hemoglobin  3.  As per GI  4.  Await pathology    3.  DVT prophylaxis-SCDs    4.  CODE STATUS-full    5.  Atrial fibrillation\flutter-was on Eliquis now held.  Rate acceptable.    6.  OBS-the patient is much more verbal and more wakeful.    7.  ZOE-creatinine is improved today.        Subjective:   Aldo Valdez is a(n) 79 year old male who is breathing comfortably    Objective:   Blood pressure 142/78, pulse 111, temperature 99 °F (37.2 °C), temperature source Temporal, resp. rate (!) 28, height 175.3 cm (5' 9\"), weight 156 lb 8.4 oz (71 kg), SpO2 92%.    Physical Exam alert white male  HEENT examination is unremarkable with pupils equal round and reactive to light and accommodation.   Neck without adenopathy, thyromegaly, JVD nor bruit.   Lungs slightly diminished to auscultation and percussion.  Cardiac regular rate and rhythm no murmur.   Abdomen nontender, without hepatosplenomegaly and no mass appreciable.   Extremities without clubbing cyanosis nor edema.   Neurologic verbal and following commands and much more appropriate now that he is extubated.  Skin without gross abnormality     Results:     Lab Results   Component Value Date    WBC 6.8 05/27/2025    HGB 7.5 05/27/2025    HCT 23.7 05/27/2025    .0 05/27/2025     CREATSERUM 1.69 05/27/2025    BUN 48 05/27/2025     05/27/2025    K 4.2 05/27/2025     05/27/2025    CO2 21.0 05/27/2025     05/27/2025    CA 7.8 05/27/2025       Ishmael Fonseca MD  Medical Director, Critical Care, [facility]  Medical Director, [facility]  Pager: [phone]

## 2025-05-27 NOTE — PLAN OF CARE
Problem: Diabetes/Glucose Control  Goal: Glucose maintained within prescribed range  Description: INTERVENTIONS:  - Monitor Blood Glucose as ordered  - Assess for signs and symptoms of hyperglycemia and hypoglycemia  - Administer ordered medications to maintain glucose within target range  - Assess barriers to adequate nutritional intake and initiate nutrition consult as needed  - Instruct patient on self management of diabetes  Outcome: Progressing     Problem: CARDIOVASCULAR - ADULT  Goal: Maintains optimal cardiac output and hemodynamic stability  Description: INTERVENTIONS:  - Monitor vital signs, rhythm, and trends  - Monitor for bleeding, hypotension and signs of decreased cardiac output  - Evaluate effectiveness of vasoactive medications to optimize hemodynamic stability  - Monitor arterial and/or venous puncture sites for bleeding and/or hematoma  - Assess quality of pulses, skin color and temperature  - Assess for signs of decreased coronary artery perfusion - ex. Angina  - Evaluate fluid balance, assess for edema, trend weights  Outcome: Progressing     Problem: HEMATOLOGIC - ADULT  Goal: Free from bleeding injury  Description: (Example usage: patient with low platelets)  INTERVENTIONS:  - Avoid intramuscular injections, enemas and rectal medication administration  - Ensure safe mobilization of patient  - Hold pressure on venipuncture sites to achieve adequate hemostasis  - Assess for signs and symptoms of internal bleeding  - Monitor lab trends  - Patient is to report abnormal signs of bleeding to staff  - Avoid use of toothpicks and dental floss  - Use electric shaver for shaving  - Use soft bristle tooth brush  - Limit straining and forceful nose blowing  Outcome: Progressing     Problem: METABOLIC/FLUID AND ELECTROLYTES - ADULT  Goal: Glucose maintained within prescribed range  Description: INTERVENTIONS:  - Monitor Blood Glucose as ordered  - Assess for signs and symptoms of hyperglycemia and  hypoglycemia  - Administer ordered medications to maintain glucose within target range  - Assess barriers to adequate nutritional intake and initiate nutrition consult as needed  - Instruct patient on self management of diabetes  Outcome: Progressing

## 2025-05-27 NOTE — PROGRESS NOTES
[facility]  part of [facility]    Cardiology Progress Note    Aldo Valdez Patient Status:  Inpatient    1945 MRN [id number]   Location [facility] 2W/SW Attending Edward Pa MD   Hosp Day # 5 PCP Wyatt Ross DO       Impression/Plan:  79 year old male presenting with:    1. witnessed bradycardic CV arrest (25), +throat swelling- anaphylactic reaction? (unknown culprit medication)--> steroids/benadryl, holding lisinopril     2. pAF on apixaban--> GIB, Hg 6.4 s/p prbc transfusion--> AC held, EGD/C-scope 25: diverticulosis (likely cause), polyp s/p cautery/snare.    - TTE this admission with normal LV function, no significant valve disease  - Cont dilt gtt for rate control in AF, resume oral agents when patient able to take po  - Anticoagulation for stroke prevention remains on hold; per GI note , hold for at least 3 more days  - 40mg IV lasix given , will dose again today; wean O2 as tolerated  - Monitor on tele  - Will follow     Subjective: No events overnight.  Today patient denies chest pain, palpitations, dyspnea.    Problem List[1]    Objective:   Temp: 98.4 °F (36.9 °C)  Pulse: 96  Resp: 25  BP: 142/74    Intake/Output:     Intake/Output Summary (Last 24 hours) at 2025 0740  Last data filed at 2025 0400  Gross per 24 hour   Intake 3583.75 ml   Output 3450 ml   Net 133.75 ml       Last 3 Weights   25 0400 156 lb 8.4 oz (71 kg)   25 0200 151 lb 14.4 oz (68.9 kg)   25 2238 157 lb 6.5 oz (71.4 kg)   25 1426 180 lb (81.6 kg)   10/08/24 1853 180 lb (81.6 kg)   19 0857 188 lb (85.3 kg)       Tele: AF    Physical Exam:    General: Awake and alert  HEENT: Normocephalic, anicteric sclera, neck supple, no thyromegaly or adenopathy.  Neck: No JVD, carotids 2+, no bruits.  Cardiac: Irregularly irregular  Lungs: Coarse breath sounds bilat  Abdomen: Soft, non-tender. No organosplenomegally, mass or rebound,  BS-present.  Extremities: Without clubbing, cyanosis or edema.  Peripheral pulses are 2+.  Neurologic: Awake and alert  Skin: Warm and dry.     Laboratory/Data:    Labs:         Recent Labs   Lab 05/22/25  1424 05/22/25  1643 05/23/25  0527 05/23/25  1159 05/24/25  0950 05/24/25  1157 05/24/25  1754 05/25/25  0033 05/25/25  0544 05/26/25  0439   WBC 4.6  --  4.8  --  4.4  --   --   --  8.3 4.9   HGB 7.3*   < > 8.6*   < > 9.5* 9.4* 9.1* 8.5* 8.6* 7.0*   MCV 77.6*  --  82.2  --  79.8*  --   --   --  81.5 83.6   .0  --  257.0  --  287.0  --   --   --  280.0 270.0   INR 2.17*  --  1.47*  --   --   --   --   --   --   --     < > = values in this interval not displayed.       Recent Labs   Lab 05/24/25  0236 05/25/25  0544 05/26/25  0439 05/26/25  1453 05/27/25  0628     143 143 147* 149* 148*   K 3.5  3.5 4.9 4.6 4.7 4.2   *  117* 114* 118* 118* 116*   CO2 15.0*  15.0* 20.0* 20.0* 19.0* 21.0   BUN 32*  32* 45* 47* 45* 48*   CREATSERUM 1.59*  1.59* 1.99* 1.83* 1.76* 1.69*   CA 6.7*  6.7* 8.2* 8.1* 8.0* 7.8*   *  226* 229* 145* 173* 190*       Recent Labs   Lab 05/22/25  1424 05/23/25  0527 05/24/25  0236 05/25/25  0544 05/26/25  0439   ALT 14 9* 23 22 19   AST 15 12 39* 38* 27   ALB 3.7 2.9* 2.4* 3.2 3.2       No results for input(s): \"TROP\" in the last 168 hours.    Allergies:   Allergies[2]    Medications:  Current Hospital Medications[3]    Kaiser Byrne MD  5/27/2025  7:40 AM         [1]   Patient Active Problem List  Diagnosis    Essential hypertension    Back pain    Pure hypercholesterolemia    Cerebral infarction involving posterior cerebral artery, right (HCC)    Anxiety    Acute gastrointestinal hemorrhage    Tremor    Mixed Alzheimer and vascular dementia (HCC)   [2]   Allergies  Allergen Reactions    Lisinopril ANGIOEDEMA     Per Dr. Fonseca, continue to hold Lisinopril as Pt is an unreliable historian & Pt experiences cardiopulmonary arrest in which angioedema from Lisinopril is  suspected;   [3]   Current Facility-Administered Medications   Medication Dose Route Frequency    dextrose 5% infusion   Intravenous Continuous    furosemide (Lasix) 10 mg/mL injection 40 mg  40 mg Intravenous Once    insulin aspart (NovoLOG) 100 Units/mL FlexPen 1-5 Units  1-5 Units Subcutaneous TID CC    LORazepam (Ativan) 2 mg/mL injection 0.5 mg  0.5 mg Intravenous Daily PRN    dilTIAZem 10 mg BOLUS FROM BAG infusion  10 mg Intravenous Q1H PRN    dilTIAZem (cardIZEM) 100 mg in sodium chloride 0.9% 100 mL IVPB-ADDV  2.5-20 mg/hr Intravenous Continuous    [Held by provider] dilTIAZem (cardIZEM) tab 30 mg  30 mg Oral 4 times per day    pantoprazole (Protonix) 40 mg in sodium chloride 0.9% PF 10 mL IV push  40 mg Intravenous Daily    metoprolol tartrate (Lopressor) tab 25 mg  25 mg Oral 2x Daily(Beta Blocker)    acetaminophen (Tylenol) tab 650 mg  650 mg Oral Q4H PRN    ondansetron (Zofran) 4 MG/2ML injection 4 mg  4 mg Intravenous Q6H PRN    metoclopramide (Reglan) 5 mg/mL injection 5 mg  5 mg Intravenous Q8H PRN    glucose (Dex4) 15 GM/59ML oral liquid 15 g  15 g Oral Q15 Min PRN    Or    glucose (Glutose) 40% oral gel 15 g  15 g Oral Q15 Min PRN    Or    glucose-vitamin C (Dex-4) chewable tab 4 tablet  4 tablet Oral Q15 Min PRN    Or    dextrose 50% injection 50 mL  50 mL Intravenous Q15 Min PRN    Or    glucose (Dex4) 15 GM/59ML oral liquid 30 g  30 g Oral Q15 Min PRN    Or    glucose (Glutose) 40% oral gel 30 g  30 g Oral Q15 Min PRN    Or    glucose-vitamin C (Dex-4) chewable tab 8 tablet  8 tablet Oral Q15 Min PRN

## 2025-05-28 NOTE — PHYSICAL THERAPY NOTE
PT order received, chart reviewed. Spoke with ANGELIKA Castaneda who reports pts HR is elevated (110-120) and she restarted Cardizem drip. Pt is pleasantly confused but not appropriate for up to chair right now. Will plan to check back tomorrow. RN aware.

## 2025-05-28 NOTE — PROGRESS NOTES
[facility]  part of [facility]    Cardiology Progress Note    Aldo Valdez Patient Status:  Inpatient    1945 MRN [id number]   Location [facility] 2W/SW Attending Edward Pa MD   Hosp Day # 6 PCP Wyatt Ross DO         Impression/Plan:  79 year old male presenting with:     1. witnessed bradycardic CV arrest (25), +throat swelling- anaphylactic reaction? (unknown culprit medication)--> steroids/benadryl, holding lisinopril     2. pAF on apixaban--> GIB, Hg 6.4 s/p prbc transfusion--> AC held, EGD/C-scope 25: diverticulosis (likely cause), polyp s/p cautery/snare.     - TTE this admission with normal LV function, no significant valve disease  - Cont metoprolol 25mg bid for rate control; start diltiazem 30mg q6h, wean dilt gtt as tolerated.  Titrate po rate control agents as needed  - Anticoagulation for stroke prevention remains on hold; per GI note , hold for at least 2 more days  - 40mg IV lasix given  and ; CXR pending for today.  Wean O2 as tolerated, additional IV lasix as needed  - Monitor on tele  - Will follow     Subjective: No events overnight.  Today patient denies chest pain, palpitations, dyspnea.    Problem List[1]    Objective:   Temp: 98.7 °F (37.1 °C)  Pulse: 102  Resp: 18  BP: 156/87    Intake/Output:     Intake/Output Summary (Last 24 hours) at 2025 1055  Last data filed at 2025 0826  Gross per 24 hour   Intake 1111.8 ml   Output 2975 ml   Net -1863.2 ml       Last 3 Weights   25 0500 154 lb 5.2 oz (70 kg)   25 0400 156 lb 8.4 oz (71 kg)   25 0200 151 lb 14.4 oz (68.9 kg)   25 2238 157 lb 6.5 oz (71.4 kg)   25 1426 180 lb (81.6 kg)   10/08/24 1853 180 lb (81.6 kg)   19 0857 188 lb (85.3 kg)       Tele: AF    Physical Exam:    General: Alert and oriented x 3. No apparent distress. No respiratory or constitutional distress.  HEENT: Normocephalic, anicteric sclera, neck supple, no  thyromegaly or adenopathy.  Neck: No JVD, carotids 2+, no bruits.  Cardiac: Irregularly irregular  Lungs: Clear without wheezes, rales, rhonchi or dullness.  Normal excursions and effort.  Abdomen: Soft, non-tender. No organosplenomegally, mass or rebound, BS-present.  Extremities: Without clubbing, cyanosis or edema.  Peripheral pulses are 2+.  Neurologic: Alert and oriented, normal affect. No motor or coordinational deficit.  Skin: Warm and dry.     Laboratory/Data:    Labs:         Recent Labs   Lab 05/22/25  1424 05/22/25  1643 05/23/25  0527 05/23/25  1159 05/24/25  0950 05/24/25  1157 05/24/25  1754 05/25/25  0033 05/25/25  0544 05/26/25  0439 05/27/25  0831   WBC 4.6  --  4.8  --  4.4  --   --   --  8.3 4.9 6.8   HGB 7.3*   < > 8.6*   < > 9.5*   < > 9.1* 8.5* 8.6* 7.0* 7.5*   MCV 77.6*  --  82.2  --  79.8*  --   --   --  81.5 83.6 82.0   .0  --  257.0  --  287.0  --   --   --  280.0 270.0 286.0   INR 2.17*  --  1.47*  --   --   --   --   --   --   --   --     < > = values in this interval not displayed.       Recent Labs   Lab 05/26/25  0439 05/26/25  1453 05/27/25  0628 05/27/25  1543 05/28/25  0405   * 149* 148* 145 145   K 4.6 4.7 4.2 3.6 3.6   * 118* 116* 113* 114*   CO2 20.0* 19.0* 21.0 21.0 23.0   BUN 47* 45* 48* 44* 34*   CREATSERUM 1.83* 1.76* 1.69* 1.66* 1.42*   CA 8.1* 8.0* 7.8* 8.0* 8.1*   * 173* 190* 133* 116*       Recent Labs   Lab 05/23/25  0527 05/24/25  0236 05/25/25  0544 05/26/25  0439 05/28/25  0405   ALT 9* 23 22 19 28   AST 12 39* 38* 27 24   ALB 2.9* 2.4* 3.2 3.2 3.5       No results for input(s): \"TROP\" in the last 168 hours.    Allergies:   Allergies[2]    Medications:  Current Hospital Medications[3]    Kaiser Byrne MD  5/28/2025  10:55 AM         [1]   Patient Active Problem List  Diagnosis    Essential hypertension    Back pain    Pure hypercholesterolemia    Cerebral infarction involving posterior cerebral artery, right (HCC)    Anxiety    Acute  gastrointestinal hemorrhage    Tremor    Mixed Alzheimer and vascular dementia (HCC)   [2]   Allergies  Allergen Reactions    Lisinopril ANGIOEDEMA     Per Dr. Fonseca, continue to hold Lisinopril as Pt is an unreliable historian & Pt experiences cardiopulmonary arrest in which angioedema from Lisinopril is suspected;   [3]   Current Facility-Administered Medications   Medication Dose Route Frequency    dilTIAZem (cardIZEM) tab 30 mg  30 mg Oral 4 times per day    insulin aspart (NovoLOG) 100 Units/mL FlexPen 1-5 Units  1-5 Units Subcutaneous TID CC    LORazepam (Ativan) 2 mg/mL injection 0.5 mg  0.5 mg Intravenous Daily PRN    dilTIAZem 10 mg BOLUS FROM BAG infusion  10 mg Intravenous Q1H PRN    dilTIAZem (cardIZEM) 100 mg in sodium chloride 0.9% 100 mL IVPB-ADDV  2.5-20 mg/hr Intravenous Continuous    [Held by provider] dilTIAZem (cardIZEM) tab 30 mg  30 mg Oral 4 times per day    pantoprazole (Protonix) 40 mg in sodium chloride 0.9% PF 10 mL IV push  40 mg Intravenous Daily    metoprolol tartrate (Lopressor) tab 25 mg  25 mg Oral 2x Daily(Beta Blocker)    acetaminophen (Tylenol) tab 650 mg  650 mg Oral Q4H PRN    ondansetron (Zofran) 4 MG/2ML injection 4 mg  4 mg Intravenous Q6H PRN    metoclopramide (Reglan) 5 mg/mL injection 5 mg  5 mg Intravenous Q8H PRN    glucose (Dex4) 15 GM/59ML oral liquid 15 g  15 g Oral Q15 Min PRN    Or    glucose (Glutose) 40% oral gel 15 g  15 g Oral Q15 Min PRN    Or    glucose-vitamin C (Dex-4) chewable tab 4 tablet  4 tablet Oral Q15 Min PRN    Or    dextrose 50% injection 50 mL  50 mL Intravenous Q15 Min PRN    Or    glucose (Dex4) 15 GM/59ML oral liquid 30 g  30 g Oral Q15 Min PRN    Or    glucose (Glutose) 40% oral gel 30 g  30 g Oral Q15 Min PRN    Or    glucose-vitamin C (Dex-4) chewable tab 8 tablet  8 tablet Oral Q15 Min PRN

## 2025-05-28 NOTE — SLP NOTE
SPEECH DAILY NOTE - INPATIENT    ASSESSMENT & PLAN   ASSESSMENT  PPE REQUIRED. THIS THERAPIST WORE GLOVES, DROPLET MASK, AND GOGGLES FOR DURATION OF EVALUATION. HANDS WASHED UPON ENTRANCE/EXIT.    SLP f/u for ongoing dysphagia tx/meal assessment per recommendations of minced and moist/mildly thick liquids per re-BSE. RN reports pt tolerates diet and medication well with no overt clinical s/s aspiration. Pt denies any swallowing challenges.     Pt positioned upright in bed. Pt afebrile, tolerating 4L/Min O2NC with oxygen status 93 prior to the start of oral trials. SLP reviewed aspiration precautions and safe swallowing compensatory strategies with the patient. Safe swallow guidelines remain written on the white board in purple. Diet recommendations remain on the whiteboard in the room. Patient's RN v/u. Provided 1:1 feed assistance, pt tolerates minced and moist and mildly thick liquids via open cup with no overt clinical signs/symptoms of aspiration. Pt unable to masticate advanced solid trials. Pt trialed with thin liquids and overt signs/symptoms of aspiration observed as evidenced by immediate coughing, throat clearing, and desaturation to 89%. Trials discontinued. Oral/buccal cavities clear of residue following all trials. Recommend continued diet of minced and moist/mildly thick liquids.     PLAN: SLP to f/u x1-2 meal assessments, monitor CXR, and VFSS if clinically warranted.     Diet Recommendations - Solids: Mechanical soft ground/ Minced & Moist  Diet Recommendations - Liquids: Nectar thick liquids/ Mildly thick    Compensatory Strategies Recommended: Alternate consistencies, Multiple swallows  Aspiration Precautions: Upright position, Slow rate, Small bites, Small sips, No straw, 1:1 feeding  Medication Administration Recommendations: Crushed in puree    Patient Experiencing Pain: No      Treatment Plan  Treatment Plan/Recommendations: Aspiration precautions    Interdisciplinary Communication: Discussed  with RN  Recommendations posted at bedside            GOALS  Goal #1 Patient will participate in ongoing clinical bedside swallow evaluation as appropriate.    In Progress   Goal #2 The patient will tolerate minced and moist consistency and mildly thick liquids without overt signs or symptoms of aspiration with 100 % accuracy over 2 session(s).  In Progress   Goal #3 The patient/family/caregiver will demonstrate understanding and implementation of aspiration precautions and swallow strategies independently over 4-5 session(s).  In Progress   Goal #4 The patient will tolerate trial upgrade of bite sized/ETC/regular consistency and thin liquids without overt signs or symptoms of aspiration with 100 % accuracy over 4-5 session(s).  In Progress   Goal #5 The patient will utilize compensatory strategies as outlined by  BSSE (clinical evaluation) including Slow rate, Small bites, Small sips, Alternate liquids/solids, No straws, Upright 90 degrees, Eliminate distractions, Feed patient with 1:1 feed assistance 100 % of the time across 2 sessions.  In Progress     FOLLOW UP  Follow Up Needed (Documentation Required): Yes  SLP Follow-up Date: 05/29/25  Duration: 1 week    Session: 1 following re-BSSE    If you have any questions, please contact BIGG Schultz M.S. CCC-SLP  Speech Language Pathologist  Phone Number Psa. [phone]

## 2025-05-28 NOTE — PLAN OF CARE
Problem: Diabetes/Glucose Control  Goal: Glucose maintained within prescribed range  Description: INTERVENTIONS:  - Monitor Blood Glucose as ordered  - Assess for signs and symptoms of hyperglycemia and hypoglycemia  - Administer ordered medications to maintain glucose within target range  - Assess barriers to adequate nutritional intake and initiate nutrition consult as needed  - Instruct patient on self management of diabetes  Outcome: Progressing     Problem: CARDIOVASCULAR - ADULT  Goal: Maintains optimal cardiac output and hemodynamic stability  Description: INTERVENTIONS:  - Monitor vital signs, rhythm, and trends  - Monitor for bleeding, hypotension and signs of decreased cardiac output  - Evaluate effectiveness of vasoactive medications to optimize hemodynamic stability  - Monitor arterial and/or venous puncture sites for bleeding and/or hematoma  - Assess quality of pulses, skin color and temperature  - Assess for signs of decreased coronary artery perfusion - ex. Angina  - Evaluate fluid balance, assess for edema, trend weights  Outcome: Progressing  Goal: Absence of cardiac arrhythmias or at baseline  Description: INTERVENTIONS:  - Continuous cardiac monitoring, monitor vital signs, obtain 12 lead EKG if indicated  - Evaluate effectiveness of antiarrhythmic and heart rate control medications as ordered  - Initiate emergency measures for life threatening arrhythmias  - Monitor electrolytes and administer replacement therapy as ordered  Outcome: Progressing     Problem: GASTROINTESTINAL - ADULT  Goal: Minimal or absence of nausea and vomiting  Description: INTERVENTIONS:  - Maintain adequate hydration with IV or PO as ordered and tolerated  - Nasogastric tube to low intermittent suction as ordered  - Evaluate effectiveness of ordered antiemetic medications  - Provide nonpharmacologic comfort measures as appropriate  - Advance diet as tolerated, if ordered  - Obtain nutritional consult as needed  -  Evaluate fluid balance  Outcome: Progressing  Goal: Maintains or returns to baseline bowel function  Description: INTERVENTIONS:  - Assess bowel function  - Maintain adequate hydration with IV or PO as ordered and tolerated  - Evaluate effectiveness of GI medications  - Encourage mobilization and activity  - Obtain nutritional consult as needed  - Establish a toileting routine/schedule  - Consider collaborating with pharmacy to review patient's medication profile  Outcome: Progressing     Problem: HEMATOLOGIC - ADULT  Goal: Maintains hematologic stability  Description: INTERVENTIONS  - Assess for signs and symptoms of bleeding or hemorrhage  - Monitor labs and vital signs for trends  - Administer supportive blood products/factors, fluids and medications as ordered and appropriate  - Administer supportive blood products/factors as ordered and appropriate  Outcome: Progressing  Goal: Free from bleeding injury  Description: (Example usage: patient with low platelets)  INTERVENTIONS:  - Avoid intramuscular injections, enemas and rectal medication administration  - Ensure safe mobilization of patient  - Hold pressure on venipuncture sites to achieve adequate hemostasis  - Assess for signs and symptoms of internal bleeding  - Monitor lab trends  - Patient is to report abnormal signs of bleeding to staff  - Avoid use of toothpicks and dental floss  - Use electric shaver for shaving  - Use soft bristle tooth brush  - Limit straining and forceful nose blowing  Outcome: Progressing     Problem: Patient Centered Care  Goal: Patient preferences are identified and integrated in the patient's plan of care  Description: Interventions:  - What would you like us to know as we care for you?   - Provide timely, complete, and accurate information to patient/family  - Incorporate patient and family knowledge, values, beliefs, and cultural backgrounds into the planning and delivery of care  - Encourage patient/family to participate in  care and decision-making at the level they choose  - Honor patient and family perspectives and choices  Outcome: Progressing     Problem: Patient/Family Goals  Goal: Patient/Family Long Term Goal  Description: Patient's Long Term Goal:     Interventions:  -   - See additional Care Plan goals for specific interventions  Outcome: Progressing  Goal: Patient/Family Short Term Goal  Description: Patient's Short Term Goal:     Interventions:   -  - See additional Care Plan goals for specific interventions  Outcome: Progressing     Problem: Delirium  Goal: Minimize duration of delirium  Description: Interventions:  - Encourage use of hearing aids, eye glasses  - Promote highest level of mobility daily  - Provide frequent reorientation  - Promote wakefulness i.e. lights on, blinds open  - Promote sleep, encourage patient's normal rest cycle i.e. lights off, TV off, minimize noise and interruptions  - Encourage family to assist in orientation and promotion of home routines  Outcome: Progressing     Problem: RESPIRATORY - ADULT  Goal: Achieves optimal ventilation and oxygenation  Description: INTERVENTIONS:  - Assess for changes in respiratory status  - Assess for changes in mentation and behavior  - Position to facilitate oxygenation and minimize respiratory effort  - Oxygen supplementation based on oxygen saturation or ABGs  - Provide Smoking Cessation handout, if applicable  - Encourage broncho-pulmonary hygiene including cough, deep breathe, Incentive Spirometry  - Assess the need for suctioning and perform as needed  - Assess and instruct to report SOB or any respiratory difficulty  - Respiratory Therapy support as indicated  - Manage/alleviate anxiety  - Monitor for signs/symptoms of CO2 retention  Outcome: Progressing     Problem: GENITOURINARY - ADULT  Goal: Absence of urinary retention  Description: INTERVENTIONS:  - Assess patient’s ability to void and empty bladder  - Monitor intake/output and perform bladder scan  as needed  - Follow urinary retention protocol/standard of care  - Consider collaborating with pharmacy to review patient's medication profile  - Implement strategies to promote bladder emptying  Outcome: Progressing     Problem: METABOLIC/FLUID AND ELECTROLYTES - ADULT  Goal: Glucose maintained within prescribed range  Description: INTERVENTIONS:  - Monitor Blood Glucose as ordered  - Assess for signs and symptoms of hyperglycemia and hypoglycemia  - Administer ordered medications to maintain glucose within target range  - Assess barriers to adequate nutritional intake and initiate nutrition consult as needed  - Instruct patient on self management of diabetes  Outcome: Progressing  Goal: Electrolytes maintained within normal limits  Description: INTERVENTIONS:  - Monitor labs and rhythm and assess patient for signs and symptoms of electrolyte imbalances  - Administer electrolyte replacement as ordered  - Monitor response to electrolyte replacements, including rhythm and repeat lab results as appropriate  - Fluid restriction as ordered  - Instruct patient on fluid and nutrition restrictions as appropriate  Outcome: Progressing  Goal: Hemodynamic stability and optimal renal function maintained  Description: INTERVENTIONS:  - Monitor labs and assess for signs and symptoms of volume excess or deficit  - Monitor intake, output and patient weight  - Monitor urine specific gravity, serum osmolarity and serum sodium as indicated or ordered  - Monitor response to interventions for patient's volume status, including labs, urine output, blood pressure (other measures as available)  - Encourage oral intake as appropriate  - Instruct patient on fluid and nutrition restrictions as appropriate  Outcome: Progressing     Problem: SKIN/TISSUE INTEGRITY - ADULT  Goal: Skin integrity remains intact  Description: INTERVENTIONS  - Assess and document risk factors for pressure ulcer development  - Assess and document skin integrity  -  Monitor for areas of redness and/or skin breakdown  - Initiate interventions, skin care algorithm/standards of care as needed  Outcome: Progressing     Problem: MUSCULOSKELETAL - ADULT  Goal: Return mobility to safest level of function  Description: INTERVENTIONS:  - Assess patient stability and activity tolerance for standing, transferring and ambulating w/ or w/o assistive devices  - Assist with transfers and ambulation using safe patient handling equipment as needed  - Ensure adequate protection for wounds/incisions during mobilization  - Obtain PT/OT consults as needed  - Advance activity as appropriate  - Communicate ordered activity level and limitations with patient/family  Outcome: Progressing  Goal: Maintain proper alignment of affected body part  Description: INTERVENTIONS:  - Support and protect limb and body alignment per provider's orders  - Instruct and reinforce with patient and family use of appropriate assistive device and precautions (e.g. spinal or hip dislocation precautions)  Outcome: Progressing     Problem: NEUROLOGICAL - ADULT  Goal: Achieves stable or improved neurological status  Description: INTERVENTIONS  - Assess for and report changes in neurological status  - Initiate measures to prevent increased intracranial pressure  - Maintain blood pressure and fluid volume within ordered parameters to optimize cerebral perfusion and minimize risk of hemorrhage  - Monitor temperature, glucose, and sodium. Initiate appropriate interventions as ordered  Outcome: Progressing

## 2025-05-28 NOTE — PROGRESS NOTES
[facility] Hospitalist Progress Note     CC: Hospital Follow up    PCP: Wyatt Ross DO       Assessment/Plan:   This is a 79-year-old male with history of dementia, stroke, atrial fibrillation on Eliquis, hypertension, type 2 diabetes, CKD, who presents to the hospital for evaluation of rectal bleeding.  S/p EGD/C-scope 5/23/25.      5/24/25 CODE BLUE was called for increased secretions with tongue swelling and bradycardia which led to asystolic arrest.  CPR was initiated and patient was intubated. Extubated 5/25/25.      Angioedema  - Tongue swelling with increased secretions  - Requiring intubation 5/24/2025  - IV steroids and Benadryl complete   - Full vent support complete  - Critical care on consult  - ABG reviewed  - Hold lisinopril going forward  - Extubated 5/25/25 now on RA    Tremors   - intention tremors, unclear if new  - off sedation 5/25/25  - CTH no acute process   - Neuro consulted, discussed with no further work up needed     Rectal bleeding   Hypotension, likely had component of hemorrhagic shock on admission   Acute blood loss anemia  High suspicion for diverticular hemorrhage  - 2 units of PRBC given stat in ER  - Kcentra infusion in ER  - CTA abdomen and pelvis with active extravasation in sigmoid   - GI consulted   - underwent colonoscopy urgently on admission to attempt to clip/stop bleed  - S/p EGD/C-scope 5/23/25 Colonoscopy with diverticulosis and large pedunculated polyp in the rectosigmoid region that was fully removed with hot cautery snare, bleeding was felt to be more likely diverticular without culprit lesion identified   - PPI IV   - if further bleeding may need IR involvement   - serial Hemoglobin monitoring     Atrial fibrillation  Hypoxia   - hold eliquis  - takes diltiazem 180mg BID, and toprol 50mg daily   - dilt drip   - cards consulted   - Echo with EF 55-60%  - resume AC in 1-2 days if Hg stable   - iv lasix as needed  - wean O2, IS      DM  - A1c 5.9, ISS while  on steroids     ZOE on CKD  Acidosis resolved   Hypernatremia revolved   - Cr down trending   - Likely due to decreased perfusion from bradycardic episode with cardiac arrest  - Bicarb improving  - 0.9 switched to D5 0.9, now D5 complete   - trend bmp     Dementia  - resides at the [place] at [place]  - will check TSH, b12     Fluids: D5 complete   Diet: modified   DVT prophylaxis: hold chemical prophy     Dispo: can transfer to cardiac tele     [facility] Hospitalist        Subjective:     On 4L today   Confusion is better   Getting closer to baseline  Off restraints     OBJECTIVE:    Blood pressure 156/87, pulse 102, temperature 98.7 °F (37.1 °C), temperature source Temporal, resp. rate 18, height 5' 9\" (1.753 m), weight 154 lb 5.2 oz (70 kg), SpO2 97%.    Temp:  [98.4 °F (36.9 °C)-99.3 °F (37.4 °C)] 98.7 °F (37.1 °C)  Pulse:  [] 102  Resp:  [17-29] 18  BP: (102-167)/() 156/87  SpO2:  [88 %-100 %] 97 %      Intake/Output:    Intake/Output Summary (Last 24 hours) at 5/28/2025 1032  Last data filed at 5/28/2025 0826  Gross per 24 hour   Intake 1111.8 ml   Output 2975 ml   Net -1863.2 ml       Last 3 Weights   05/28/25 0500 154 lb 5.2 oz (70 kg)   05/27/25 0400 156 lb 8.4 oz (71 kg)   05/25/25 0200 151 lb 14.4 oz (68.9 kg)   05/22/25 2238 157 lb 6.5 oz (71.4 kg)   05/22/25 1426 180 lb (81.6 kg)   10/08/24 1853 180 lb (81.6 kg)   12/18/19 0857 188 lb (85.3 kg)       /87 (BP Location: Right arm)   Pulse 102   Temp 98.7 °F (37.1 °C) (Temporal)   Resp 18   Ht 5' 9\" (1.753 m)   Wt 154 lb 5.2 oz (70 kg)   SpO2 97%   BMI 22.79 kg/m²     General: Alert, not fully oriented  Lungs: Good air movement bilaterally  Heart: Irregularly irregular  Abdomen: soft, non tender  Extremities: No edema  Neuro: Following commands.    Data Review:       Labs:     Recent Labs   Lab 05/25/25  0544 05/26/25  0439 05/27/25  0831   RBC 3.24* 2.68* 2.89*   HGB 8.6* 7.0* 7.5*   HCT 26.4*  22.4* 23.7*   MCV 81.5 83.6 82.0   MCH 26.5 26.1 26.0   MCHC 32.6 31.3 31.6   RDW 17.4* 18.0* 17.8*   NEPRELIM 6.87 3.38 5.20   WBC 8.3 4.9 6.8   .0 270.0 286.0         Recent Labs   Lab 05/27/25  0628 05/27/25  1543 05/28/25  0405   * 133* 116*   BUN 48* 44* 34*   CREATSERUM 1.69* 1.66* 1.42*   EGFRCR 41* 42* 51*   CA 7.8* 8.0* 8.1*   * 145 145   K 4.2 3.6 3.6   * 113* 114*   CO2 21.0 21.0 23.0       Recent Labs   Lab 05/23/25  0527 05/24/25  0236 05/25/25  0544 05/26/25  0439 05/28/25  0405   ALT 9* 23 22 19 28   AST 12 39* 38* 27 24   ALB 2.9* 2.4* 3.2 3.2 3.5         Imaging:  XR CHEST AP PORTABLE  (CPT=71045)  Result Date: 5/27/2025  CONCLUSION:  Cardiomegaly and stable findings consistent with mild CHF or fluid overload.     Dictated by (CST): Avtar Yo MD on 5/27/2025 at 7:13 AM     Finalized by (CST): Avtar Yo MD on 5/27/2025 at 7:14 AM          XR CHEST AP PORTABLE  (CPT=71045)  Result Date: 5/26/2025  CONCLUSION:  1. Cardiomegaly with stable pulmonary edema consistent with mild CHF/fluid overload. 2. Endotracheal and enteric tubes have been removed.    Dictated by (CST): Avtar Yo MD on 5/26/2025 at 7:21 AM     Finalized by (CST): Avtar Yo MD on 5/26/2025 at 7:22 AM          CT BRAIN OR HEAD (CPT=70450)  Result Date: 5/25/2025  CONCLUSION:  1. No acute intracranial finding. 2. 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 3. Moderate changes of chronic small vessel disease in cerebral white matter. 4. Chronic left cerebellar lacunar infarct related to small vessel disease. 5. Large vessel intracranial atherosclerosis. 6. Evaluation degraded by patient related motion artifact.    Dictated by (CST): Tam Marcelo MD on 5/25/2025 at 2:12 PM     Finalized by (CST): Tam Marcelo MD on 5/25/2025 at 2:17 PM          Meds:     Scheduled Medications[1]  Medication Infusions[2]  PRN Medications[3]                 [1]     insulin aspart  1-5 Units Subcutaneous TID CC    [Held by provider] dilTIAZem  30 mg Oral 4 times per day    pantoprazole  40 mg Intravenous Daily    metoprolol tartrate  25 mg Oral 2x Daily(Beta Blocker)   [2]    dilTIAZem 5 mg/hr (05/28/25 1001)   [3]   LORazepam    dilTIAZem    acetaminophen    ondansetron    metoclopramide    glucose **OR** glucose **OR** glucose-vitamin C **OR** dextrose **OR** glucose **OR** glucose **OR** glucose-vitamin C

## 2025-05-29 NOTE — PHYSICAL THERAPY NOTE
Chart reviewed for PT re-evaluation. Spoke with ANGELIKA Balderrama who reports pt is now on CIWA and is scoring high so just given Ativan, not appropriate for PT at this time. Will plan to check back tomorrow. RN aware

## 2025-05-29 NOTE — PROGRESS NOTES
[facility] Hospitalist Progress Note     CC: Hospital Follow up    PCP: Wyatt Ross,        Assessment/Plan:   This is a 79-year-old male with history of Dementia, CVA, Atrial Fibrillation on Eliquis, Hypertension, type 2 Diabetes, CKD, who presents to the hospital for evaluation of rectal bleeding.  S/p EGD/C-scope 5/23/25.      5/24/25 CODE BLUE was called for increased secretions with tongue swelling and bradycardia which led to asystolic arrest.  CPR was initiated and patient was intubated.  Likely cause was thought to be due to angioedema from lisinopril.  Extubated 5/25/25.      Angioedema resolved   - Tongue swelling with increased secretions  - Requiring intubation 5/24/2025  - IV steroids and Benadryl complete   - Full vent support complete  - Critical care seen  - ABG reviewed  - Hold lisinopril going forward  - Extubated 5/25/25 now on RA, +/- O2     Tremors   - intention tremors, unclear if new  - off sedation 5/25/25  - CTH no acute process   - Neuro consulted, discussed with no further work up needed   - Takes scheduled diazepam at home  - Possible benzo withdrawals  - Will resume diazepam  - Also mentioned drinking wine every night?  - History is unclear  - Will start CIWA    Rectal bleeding   Hypotension, likely had component of hemorrhagic shock on admission   Acute blood loss anemia  High suspicion for diverticular hemorrhage  - 2 units of PRBC given stat in ER  - Kcentra infusion in ER  - CTA abdomen and pelvis with active extravasation in sigmoid   - GI consulted   - underwent colonoscopy urgently on admission to attempt to clip/stop bleed  - S/p EGD/C-scope 5/23/25 Colonoscopy with diverticulosis and large pedunculated polyp in the rectosigmoid region that was fully removed with hot cautery snare, bleeding was felt to be more likely diverticular without culprit lesion identified   - PPI IV   - if further bleeding may need IR involvement   - serial Hemoglobin monitoring  - Eliquis  resumed 5/29/25      Atrial fibrillation  Hypoxia   - takes diltiazem 180mg BID, and toprol 50mg daily   - dilt drip   - cards consulted   - Echo with EF 55-60%  - resume Eliquis 5/29/25   - iv lasix as needed  - wean O2, IS, nebs      DM  - A1c 5.9, ISS used while on steroids     ZOE on CKD  Acidosis resolved   Hypernatremia revolved   - Cr down trending   - Likely due to decreased perfusion from bradycardic episode with cardiac arrest  - Bicarb improving  - 0.9 switched to D5 0.9, now D5 complete   - trend bmp     Dementia  - resides at [facility]  - TSH, b12 wnl   - was in the [place] [facility] [place] to [place]   - Guardian Juanito requesting NANCY before this transition   - Case management and social work following     Fluids: D5 complete   Diet: modified   DVT prophylaxis: Eliquis      Dispo: cardiac tele, still requiring dilt drip for rate control  Likley 2-3 days    Edward Pa MD   [facility] Hospitalist        Subjective:     On room air today but has some upper airway secretions    Confusion is better   Getting closer to baseline  Off restraints   Mentioned he drinks wine every night     OBJECTIVE:    Blood pressure (!) 150/98, pulse (!) 123, temperature 99.1 °F (37.3 °C), temperature source Temporal, resp. rate (!) 28, height 5' 9\" (1.753 m), weight 154 lb 5.2 oz (70 kg), SpO2 94%.    Temp:  [97 °F (36.1 °C)-99.1 °F (37.3 °C)] 99.1 °F (37.3 °C)  Pulse:  [] 123  Resp:  [18-28] 28  BP: (116-159)/(69-98) 150/98  SpO2:  [89 %-99 %] 94 %      Intake/Output:    Intake/Output Summary (Last 24 hours) at 5/29/2025 1208  Last data filed at 5/29/2025 1130  Gross per 24 hour   Intake 1635.05 ml   Output 700 ml   Net 935.05 ml       Last 3 Weights   05/28/25 0500 154 lb 5.2 oz (70 kg)   05/27/25 0400 156 lb 8.4 oz (71 kg)   05/25/25 0200 151 lb 14.4 oz (68.9 kg)   05/22/25 2238 157 lb 6.5 oz (71.4 kg)   05/22/25 1426 180 lb (81.6 kg)    10/08/24 1853 180 lb (81.6 kg)   12/18/19 0857 188 lb (85.3 kg)       BP (!) 150/98 (BP Location: Right arm)   Pulse (!) 123   Temp 99.1 °F (37.3 °C) (Temporal)   Resp (!) 28   Ht 5' 9\" (1.753 m)   Wt 154 lb 5.2 oz (70 kg)   SpO2 94%   BMI 22.79 kg/m²     General: Alert, not fully oriented  Lungs: Good air movement bilaterally, upper airway gurgling   Heart: Irregularly irregular  Abdomen: soft, non tender  Extremities: No edema  Neuro: Following commands, intention tremors    Data Review:       Labs:     Recent Labs   Lab 05/25/25  0544 05/26/25  0439 05/27/25  0831 05/29/25  0425   RBC 3.24* 2.68* 2.89*  --    HGB 8.6* 7.0* 7.5* 7.5*   HCT 26.4* 22.4* 23.7* 24.5*   MCV 81.5 83.6 82.0  --    MCH 26.5 26.1 26.0  --    MCHC 32.6 31.3 31.6  --    RDW 17.4* 18.0* 17.8*  --    NEPRELIM 6.87 3.38 5.20  --    WBC 8.3 4.9 6.8  --    .0 270.0 286.0  --          Recent Labs   Lab 05/27/25  1543 05/28/25  0405 05/29/25  0425   * 116* 125*   BUN 44* 34* 27*   CREATSERUM 1.66* 1.42* 1.26   EGFRCR 42* 51* 59*   CA 8.0* 8.1* 8.0*    145 142   K 3.6 3.6 3.2*   * 114* 111   CO2 21.0 23.0 23.0       Recent Labs   Lab 05/24/25  0236 05/25/25  0544 05/26/25  0439 05/28/25  0405 05/29/25  0425   ALT 23 22 19 28 43   AST 39* 38* 27 24 42*   ALB 2.4* 3.2 3.2 3.5 3.3         Imaging:  XR CHEST AP PORTABLE  (CPT=71045)  Result Date: 5/28/2025  CONCLUSION:   Stable cardiomegaly with improved bilateral perihilar interstitial opacity.    Dictated by (CST): Kendell Wick MD on 5/28/2025 at 12:01 PM     Finalized by (CST): Kendell Wick MD on 5/28/2025 at 12:02 PM          XR CHEST AP PORTABLE  (CPT=71045)  Result Date: 5/27/2025  CONCLUSION:  Cardiomegaly and stable findings consistent with mild CHF or fluid overload.     Dictated by (CST): Avtar Yo MD on 5/27/2025 at 7:13 AM     Finalized by (CST): Avtar Yo MD on 5/27/2025 at 7:14 AM          Meds:     Scheduled  Medications[1]  Medication Infusions[2]  PRN Medications[3]                 [1]    apixaban  5 mg Oral BID    diazePAM  2 mg Oral Daily    diazePAM  5 mg Oral Nightly    [START ON 5/30/2025] ergocalciferol  50,000 Units Oral Weekly    dilTIAZem  60 mg Oral 4 times per day    insulin aspart  1-5 Units Subcutaneous TID CC    pantoprazole  40 mg Intravenous Daily    metoprolol tartrate  25 mg Oral 2x Daily(Beta Blocker)   [2]    dilTIAZem 10 mg (05/29/25 1108)   [3]   ipratropium-albuterol    LORazepam    dilTIAZem    acetaminophen    ondansetron    metoclopramide    glucose **OR** glucose **OR** glucose-vitamin C **OR** dextrose **OR** glucose **OR** glucose **OR** glucose-vitamin C

## 2025-05-29 NOTE — PROGRESS NOTES
[facility]  part of [facility]    Cardiology Progress Note    Aldo Valdez Patient Status:  Inpatient    1945 MRN [id number]   Location [facility] 2W/SW Attending Edward Pa MD   Hosp Day # 7 PCP Wyatt Ross DO         Impression/Plan:  79 year old male presenting with:     1. witnessed bradycardic CV arrest (25), +throat swelling- anaphylactic reaction? (unknown culprit medication)--> steroids/benadryl, holding lisinopril     2. pAF on apixaban--> GIB, Hg 6.4 s/p prbc transfusion--> AC held, EGD/C-scope 25: diverticulosis (likely cause), polyp s/p cautery/snare.     - TTE this admission with normal LV function, no significant valve disease  - Cont metoprolol 25mg bid for rate control; increase oral dilitazem, wean dilt gtt as tolerated.  Titrate po rate control agents as needed  - Anticoagulation for stroke prevention remains on hold; per GI note , resume as able  - IV lasix PRN  - Monitor on tele  - Will follow     Subjective:     Sleepy for me this morning.     Problem List[1]    Objective:   Temp: 98.9 °F (37.2 °C)  Pulse: 106  Resp: 24  BP: 127/69    Intake/Output:     Intake/Output Summary (Last 24 hours) at 2025 0706  Last data filed at 2025 2100  Gross per 24 hour   Intake 1260 ml   Output 700 ml   Net 560 ml       Last 3 Weights   25 0500 154 lb 5.2 oz (70 kg)   25 0400 156 lb 8.4 oz (71 kg)   25 0200 151 lb 14.4 oz (68.9 kg)   25 2238 157 lb 6.5 oz (71.4 kg)   25 1426 180 lb (81.6 kg)   10/08/24 1853 180 lb (81.6 kg)   19 0857 188 lb (85.3 kg)       Tele: AF    Physical Exam:    General: Alert and oriented x 3. No apparent distress. No respiratory or constitutional distress.  HEENT: Normocephalic, anicteric sclera, neck supple  Neck: supple  Cardiac: Irregularly irregular  Lungs: Clear without wheezes, rales, rhonchi or dullness.  Normal excursions and effort.  Abdomen: Soft, non-distended  Extremities:  Without clubbing, cyanosis or edema.  Peripheral pulses are 2+.  Neurologic: Alert and oriented, normal affect. No motor or coordinational deficit.  Skin: Warm and dry.     Laboratory/Data:    Labs:    Lab Results   Component Value Date    T4F 0.8 05/29/2025    TSH 1.122 05/29/2025    B12 477 05/29/2025       Recent Labs   Lab 05/22/25  1424 05/22/25  1643 05/23/25  0527 05/23/25  1159 05/24/25  0950 05/24/25  1157 05/25/25  0033 05/25/25  0544 05/26/25  0439 05/27/25  0831 05/29/25  0425   WBC 4.6  --  4.8  --  4.4  --   --  8.3 4.9 6.8  --    HGB 7.3*   < > 8.6*   < > 9.5*   < > 8.5* 8.6* 7.0* 7.5* 7.5*   MCV 77.6*  --  82.2  --  79.8*  --   --  81.5 83.6 82.0  --    .0  --  257.0  --  287.0  --   --  280.0 270.0 286.0  --    INR 2.17*  --  1.47*  --   --   --   --   --   --   --   --     < > = values in this interval not displayed.       Recent Labs   Lab 05/26/25  1453 05/27/25  0628 05/27/25  1543 05/28/25  0405 05/29/25 0425   * 148* 145 145 142   K 4.7 4.2 3.6 3.6 3.2*   * 116* 113* 114* 111   CO2 19.0* 21.0 21.0 23.0 23.0   BUN 45* 48* 44* 34* 27*   CREATSERUM 1.76* 1.69* 1.66* 1.42* 1.26   CA 8.0* 7.8* 8.0* 8.1* 8.0*   * 190* 133* 116* 125*       Recent Labs   Lab 05/24/25  0236 05/25/25  0544 05/26/25  0439 05/28/25  0405 05/29/25  0425   ALT 23 22 19 28 43   AST 39* 38* 27 24 42*   ALB 2.4* 3.2 3.2 3.5 3.3       No results for input(s): \"TROP\" in the last 168 hours.    ECHO:  1. Left ventricle: The cavity size was normal. Wall thickness was normal.      Systolic function was normal. The estimated ejection fraction was 55-60%,      by biplane method of disks. Wall motion is normal; there are no regional      wall motion abnormalities. Unable to assess LV diastolic function due to      heart rhythm.   2. Right ventricle: The cavity size was normal. Systolic function was      normal.   3. Left atrium: The atrial volume was mildly increased.   4. Aortic valve: There was thickening,  consistent with sclerosis.   Impressions:  No previous study was available for comparison.           Allergies:   Allergies[2]    Medications:  Current Hospital Medications[3]           [1]   Patient Active Problem List  Diagnosis    Essential hypertension    Back pain    Pure hypercholesterolemia    Cerebral infarction involving posterior cerebral artery, right (HCC)    Anxiety    Acute gastrointestinal hemorrhage    Tremor    Mixed Alzheimer and vascular dementia (HCC)   [2]   Allergies  Allergen Reactions    Lisinopril ANGIOEDEMA     Per Dr. Fonseca, continue to hold Lisinopril as Pt is an unreliable historian & Pt experiences cardiopulmonary arrest in which angioedema from Lisinopril is suspected;   [3]   Current Facility-Administered Medications   Medication Dose Route Frequency    apixaban (Eliquis) tab 5 mg  5 mg Oral BID    diazePAM (Valium) tab 2 mg  2 mg Oral Daily    diazePAM (Valium) tab 5 mg  5 mg Oral Nightly    [START ON 5/30/2025] ergocalciferol (Vitamin D2) cap 50,000 Units  50,000 Units Oral Weekly    dilTIAZem (cardIZEM) tab 30 mg  30 mg Oral 4 times per day    insulin aspart (NovoLOG) 100 Units/mL FlexPen 1-5 Units  1-5 Units Subcutaneous TID CC    LORazepam (Ativan) 2 mg/mL injection 0.5 mg  0.5 mg Intravenous Daily PRN    dilTIAZem 10 mg BOLUS FROM BAG infusion  10 mg Intravenous Q1H PRN    dilTIAZem (cardIZEM) 100 mg in sodium chloride 0.9% 100 mL IVPB-ADDV  2.5-20 mg/hr Intravenous Continuous    pantoprazole (Protonix) 40 mg in sodium chloride 0.9% PF 10 mL IV push  40 mg Intravenous Daily    metoprolol tartrate (Lopressor) tab 25 mg  25 mg Oral 2x Daily(Beta Blocker)    acetaminophen (Tylenol) tab 650 mg  650 mg Oral Q4H PRN    ondansetron (Zofran) 4 MG/2ML injection 4 mg  4 mg Intravenous Q6H PRN    metoclopramide (Reglan) 5 mg/mL injection 5 mg  5 mg Intravenous Q8H PRN    glucose (Dex4) 15 GM/59ML oral liquid 15 g  15 g Oral Q15 Min PRN    Or    glucose (Glutose) 40% oral gel 15 g  15 g  Oral Q15 Min PRN    Or    glucose-vitamin C (Dex-4) chewable tab 4 tablet  4 tablet Oral Q15 Min PRN    Or    dextrose 50% injection 50 mL  50 mL Intravenous Q15 Min PRN    Or    glucose (Dex4) 15 GM/59ML oral liquid 30 g  30 g Oral Q15 Min PRN    Or    glucose (Glutose) 40% oral gel 30 g  30 g Oral Q15 Min PRN    Or    glucose-vitamin C (Dex-4) chewable tab 8 tablet  8 tablet Oral Q15 Min PRN

## 2025-05-29 NOTE — OCCUPATIONAL THERAPY NOTE
OT orders for re evaluation received. Per nurse, pt with high CIWA scores, medicated (Ativan) and not appropriate to participate in therapy at this time. Plan to follow.

## 2025-05-29 NOTE — DIETARY NOTE
ADULT NUTRITION INITIAL ASSESSMENT    Pt is at moderate nutrition risk.  Pt meets moderate malnutrition criteria.      RECOMMENDATIONS TO MD:   RD added ONS TID to meal trays. RN staff to provide 1:1 assistance at meal times with encouragement.  See Nutrition Intervention for diet and ONS specifics     ADMITTING DIAGNOSIS:  Acute gastrointestinal hemorrhage [K92.2]  PERTINENT PAST MEDICAL HISTORY: Past Medical History[1]    PATIENT STATUS:   Initial 05/29/25: Pt assessed due to new MST entered by RN, at risk for recent wt loss and poor appetite/intake. Pt presented to hospital with c/o diarrhea, rectal bleeding. Pt has extensive PMH including dementia and stroke. Noted code blue called on 5/24 due to increased secretions with tongue swelling and bradycardia which lead to asystolic arrest - orally intubated 5/24, extubated on 5/25. Failed BSSE on 5/25 however passed on 5/26 and PO diet resumed. Pt lying in bed at time of visit. Pt demanding ice water. Redirected several times and explained to pt that he can only have thickened water at this time. Provided pt with several spoonfuls of thickened water however continued to request ice water. Pt is a poor historian however reports typical wt 185 lbs. Limited wt hx available in EMR however noted most recent wt  lbs on 10/8/24. Per RN pt refusing all food today, requesting only water but does not want the thickened water. Noted pt from independent living with spouse who recently passed. Pt has a legal guardian who was in the process of moving the pt to a memory care unit.           FOOD/NUTRITION RELATED HISTORY:  Appetite: Poor  Intake: Good intake prior to intubated on 5/24; extubated on 5/25 however failed BSSE; PO diet resumed 5/26. No intake documented on 5/27 and 25-50% of recorded meal trays on 5/28. Refusing all food today.  Intake Meeting Needs: No, but oral nutrition supplements (ONS) to maximize  Percent Meals Eaten (last 6 days)       Date/Time Percent  Meals Eaten (%)    05/23/25 1000 100 %    05/23/25 1600 100 %    05/26/25 1021 70 %    05/26/25 1255 75 %    05/26/25 1751 40 %    05/28/25 0826 50 %    05/28/25 1316 50 %    05/28/25 1820 25 %        Food Allergies: No Known Food Allergies (NKFA)  Cultural/Ethnic/Jewish Preferences: None    GASTROINTESTINAL: +BM loose, brown smear x1 / 24 hrs, Loss of appetite, and abdomen is soft, non-distended, non-tender  UO: 700 ml output over the past 24 hrs  I/Os: +1.2 L total this admission    MEDICATIONS: reviewed   dilTIAZem 5 mg/hr (05/29/25 1254)      apixaban  5 mg Oral BID    diazePAM  2 mg Oral Daily    diazePAM  5 mg Oral Nightly    [START ON 5/30/2025] ergocalciferol  50,000 Units Oral Weekly    dilTIAZem  60 mg Oral 4 times per day    insulin aspart  1-5 Units Subcutaneous TID CC    pantoprazole  40 mg Intravenous Daily    metoprolol tartrate  25 mg Oral 2x Daily(Beta Blocker)     LABS: reviewed; recent A1C 5.9 on 5/25/25; recent POC Glucose 150,158,127,169  Recent Labs     05/27/25  1543 05/28/25  0405 05/29/25  0425   * 116* 125*   BUN 44* 34* 27*   CREATSERUM 1.66* 1.42* 1.26   CA 8.0* 8.1* 8.0*   MG  --   --  1.8    145 142   K 3.6 3.6 3.2*   * 114* 111   CO2 21.0 23.0 23.0   OSMOCALC 313* 309* 301*     WEIGHT HISTORY:  Patient Weight(s) for the past 336 hrs:   Weight   05/28/25 0500 70 kg (154 lb 5.2 oz)   05/27/25 0400 71 kg (156 lb 8.4 oz)   05/25/25 0200 68.9 kg (151 lb 14.4 oz)   05/22/25 2238 71.4 kg (157 lb 6.5 oz)   05/22/25 1426 81.6 kg (180 lb)     Wt Readings from Last 10 Encounters:   05/28/25 70 kg (154 lb 5.2 oz)   10/08/24 81.6 kg (180 lb)   12/18/19 85.3 kg (188 lb)     ANTHROPOMETRICS:  HT: 175.3 cm (5' 9\")  Wt Readings from Last 1 Encounters:   05/28/25 70 kg (154 lb 5.2 oz)   Last weight: Likely accurate and reflects 16% (28 lb) wt loss from recent wt in EMR ~6 months ago   Dosing Weight: 69 kg (152 lbs) - recent weight / lowest wt this admission taken on 5/25/25,  utilized for anthropometric calculations  BMI: Body mass index is 22.43 kg/m².  BMI CLASSIFICATION: 18.5-24.9 kg/m2 - WNL  IBW/lbs (Calculated) Male: 160 lbs           95% IBW  Usual Body Wt: 185 lbs (per pt report)      82% UBW    NUTRITION RELATED PHYSICAL FINDINGS:  - Nutrition Focused Physical Exam (NFPE): moderate depletion body fat orbital region and fat overlying rib cage region and moderate depletion muscle mass temple region, clavicle region, scapular region, shoulder region, thigh region, and calf region  - Fluid Accumulation: none . See RN documentation for details  - Skin Integrity: reddened to sacrum. See RN documentation for details    CRITERIA FOR MALNUTRITION DIAGNOSIS:  Criteria for non-severe malnutrition diagnosis: chronic illness related to wt loss 10% in 6 months and body fat moderate depletion and muscle mass moderate depletion.    NUTRITION DIAGNOSIS/PROBLEM:   Moderate Malnutrition related to Chronic - Physiological causes resulting in anorexia or diminished intake as evidenced by wt loss 10% in 6 months and body fat moderate depletion and muscle mass moderate depletion    NUTRITION INTERVENTION:     NUTRITION PRESCRIPTION:   Estimated Nutrition needs: --dosing wt of 69 kg - wt taken on 5/25/25  Calories: 4633-7847 calories/day (25-30 calories per kg Dosing wt)  Protein:  g protein/day (1.2-1.5 g protein/kg Dosing wt)  Fluid Needs: 0492-0767 ml/day (1 ml/kcal)    - Diet:       Procedures    Carbohydrate controlled diet 2000 kcal/75 grams; Fluid Consistency: Nectar Thick / Mildly Thick Liquids; Texture Consistency: Ground / Minced & Moist; Is Patient on Accuchecks? Yes; Is Patient on Suicide Precautions? No   **diet liberalized to least restrictive to promote improved PO intake**     - Nutrition Care Plan: Adjusted diet to least restrictive to maximize intake, Encouraged increased PO intake, and Initiated ONS (oral nutritional supplements)  - ONS (Oral Nutrition  Supplements)/Meals/Snacks: SF Shake (200 calories/ 7 g protein each) TID Vanilla or Chocolate   - Vitamin and mineral supplements: vitamin D  - Feeding assistance: meal set up and assistance at meal times and with menu selection, encouragement at meal times  - Nutrition education: not appropriate  - Coordination of nutrition care: collaboration with other providers - discussed with RN on unit  - Discharge and transfer of nutrition care to new setting or provider: monitor plans    MONITOR AND EVALUATE/NUTRITION GOALS:  - Food and Nutrient Intake:      Monitor: adequacy of PO intake and adequacy of supplement intake  - Food and Nutrient Administration:      Monitor: need for temporary nutrition support and goc/family wishes regarding nutrition  - Anthropometric Measurement:    Monitor weight  - Nutrition Goals:      PO and supplement greater than 75% of needs, euglycemia, minimize lean body mass loss, prevent skin breakdown, and halt true wt loss    DIETITIAN FOLLOW UP: RD to follow and monitor nutrition status    Elizabeth No MS, RD, LDN, [facility]  [phone]         [1]   Past Medical History:   A-fib (HCC)    CVA (cerebral vascular accident) (HCC)    Diabetes (HCC)

## 2025-05-29 NOTE — SPIRITUAL CARE NOTE
Spiritual Care Visit Note    Patient Name: Aldo Valdez Date of Spiritual Care Visit: 25   : 1945 Primary Dx: Acute gastrointestinal hemorrhage       Referred By: Referral From: Care Coordination    Spiritual Care Taxonomy:    Intended Effects: Build relationship of care and support    Methods: Collaborate with care team member    Interventions: Provide hospitality, Silent prayer    Visit Type/Summary:     - Spiritual Care: Consulted with RN prior to visit. Offered empathic listening and emotional support. Patient is sleepy and disoriented, asking for water repeatedly, nurse aware of it. No visitors at this time.  remains available as needed for follow up.    Spiritual Care support can be requested via an Epic consult. For urgent/immediate needs, please contact the On Call  at: [place]: ext [phone]    Francisco DAVIES  Chaplain Resident  [phone]

## 2025-05-29 NOTE — PLAN OF CARE
Problem: Diabetes/Glucose Control  Goal: Glucose maintained within prescribed range  Description: INTERVENTIONS:  - Monitor Blood Glucose as ordered  - Assess for signs and symptoms of hyperglycemia and hypoglycemia  - Administer ordered medications to maintain glucose within target range  - Assess barriers to adequate nutritional intake and initiate nutrition consult as needed  - Instruct patient on self management of diabetes  Outcome: Progressing     Problem: CARDIOVASCULAR - ADULT  Goal: Maintains optimal cardiac output and hemodynamic stability  Description: INTERVENTIONS:  - Monitor vital signs, rhythm, and trends  - Monitor for bleeding, hypotension and signs of decreased cardiac output  - Evaluate effectiveness of vasoactive medications to optimize hemodynamic stability  - Monitor arterial and/or venous puncture sites for bleeding and/or hematoma  - Assess quality of pulses, skin color and temperature  - Assess for signs of decreased coronary artery perfusion - ex. Angina  - Evaluate fluid balance, assess for edema, trend weights  Outcome: Progressing  Goal: Absence of cardiac arrhythmias or at baseline  Description: INTERVENTIONS:  - Continuous cardiac monitoring, monitor vital signs, obtain 12 lead EKG if indicated  - Evaluate effectiveness of antiarrhythmic and heart rate control medications as ordered  - Initiate emergency measures for life threatening arrhythmias  - Monitor electrolytes and administer replacement therapy as ordered  Outcome: Progressing     Problem: GASTROINTESTINAL - ADULT  Goal: Minimal or absence of nausea and vomiting  Description: INTERVENTIONS:  - Maintain adequate hydration with IV or PO as ordered and tolerated  - Nasogastric tube to low intermittent suction as ordered  - Evaluate effectiveness of ordered antiemetic medications  - Provide nonpharmacologic comfort measures as appropriate  - Advance diet as tolerated, if ordered  - Obtain nutritional consult as needed  -  Evaluate fluid balance  Outcome: Progressing  Goal: Maintains or returns to baseline bowel function  Description: INTERVENTIONS:  - Assess bowel function  - Maintain adequate hydration with IV or PO as ordered and tolerated  - Evaluate effectiveness of GI medications  - Encourage mobilization and activity  - Obtain nutritional consult as needed  - Establish a toileting routine/schedule  - Consider collaborating with pharmacy to review patient's medication profile  Outcome: Progressing     Problem: HEMATOLOGIC - ADULT  Goal: Maintains hematologic stability  Description: INTERVENTIONS  - Assess for signs and symptoms of bleeding or hemorrhage  - Monitor labs and vital signs for trends  - Administer supportive blood products/factors, fluids and medications as ordered and appropriate  - Administer supportive blood products/factors as ordered and appropriate  Outcome: Progressing  Goal: Free from bleeding injury  Description: (Example usage: patient with low platelets)  INTERVENTIONS:  - Avoid intramuscular injections, enemas and rectal medication administration  - Ensure safe mobilization of patient  - Hold pressure on venipuncture sites to achieve adequate hemostasis  - Assess for signs and symptoms of internal bleeding  - Monitor lab trends  - Patient is to report abnormal signs of bleeding to staff  - Avoid use of toothpicks and dental floss  - Use electric shaver for shaving  - Use soft bristle tooth brush  - Limit straining and forceful nose blowing  Outcome: Progressing     Problem: RESPIRATORY - ADULT  Goal: Achieves optimal ventilation and oxygenation  Description: INTERVENTIONS:  - Assess for changes in respiratory status  - Assess for changes in mentation and behavior  - Position to facilitate oxygenation and minimize respiratory effort  - Oxygen supplementation based on oxygen saturation or ABGs  - Provide Smoking Cessation handout, if applicable  - Encourage broncho-pulmonary hygiene including cough, deep  breathe, Incentive Spirometry  - Assess the need for suctioning and perform as needed  - Assess and instruct to report SOB or any respiratory difficulty  - Respiratory Therapy support as indicated  - Manage/alleviate anxiety  - Monitor for signs/symptoms of CO2 retention  Outcome: Progressing     Problem: METABOLIC/FLUID AND ELECTROLYTES - ADULT  Goal: Glucose maintained within prescribed range  Description: INTERVENTIONS:  - Monitor Blood Glucose as ordered  - Assess for signs and symptoms of hyperglycemia and hypoglycemia  - Administer ordered medications to maintain glucose within target range  - Assess barriers to adequate nutritional intake and initiate nutrition consult as needed  - Instruct patient on self management of diabetes  Outcome: Progressing  Goal: Electrolytes maintained within normal limits  Description: INTERVENTIONS:  - Monitor labs and rhythm and assess patient for signs and symptoms of electrolyte imbalances  - Administer electrolyte replacement as ordered  - Monitor response to electrolyte replacements, including rhythm and repeat lab results as appropriate  - Fluid restriction as ordered  - Instruct patient on fluid and nutrition restrictions as appropriate  Outcome: Progressing  Goal: Hemodynamic stability and optimal renal function maintained  Description: INTERVENTIONS:  - Monitor labs and assess for signs and symptoms of volume excess or deficit  - Monitor intake, output and patient weight  - Monitor urine specific gravity, serum osmolarity and serum sodium as indicated or ordered  - Monitor response to interventions for patient's volume status, including labs, urine output, blood pressure (other measures as available)  - Encourage oral intake as appropriate  - Instruct patient on fluid and nutrition restrictions as appropriate  Outcome: Progressing     Problem: SKIN/TISSUE INTEGRITY - ADULT  Goal: Skin integrity remains intact  Description: INTERVENTIONS  - Assess and document risk  factors for pressure ulcer development  - Assess and document skin integrity  - Monitor for areas of redness and/or skin breakdown  - Initiate interventions, skin care algorithm/standards of care as needed  Outcome: Progressing     Problem: MUSCULOSKELETAL - ADULT  Goal: Return mobility to safest level of function  Description: INTERVENTIONS:  - Assess patient stability and activity tolerance for standing, transferring and ambulating w/ or w/o assistive devices  - Assist with transfers and ambulation using safe patient handling equipment as needed  - Ensure adequate protection for wounds/incisions during mobilization  - Obtain PT/OT consults as needed  - Advance activity as appropriate  - Communicate ordered activity level and limitations with patient/family  Outcome: Progressing  Goal: Maintain proper alignment of affected body part  Description: INTERVENTIONS:  - Support and protect limb and body alignment per provider's orders  - Instruct and reinforce with patient and family use of appropriate assistive device and precautions (e.g. spinal or hip dislocation precautions)  Outcome: Progressing     Problem: NEUROLOGICAL - ADULT  Goal: Achieves stable or improved neurological status  Description: INTERVENTIONS  - Assess for and report changes in neurological status  - Initiate measures to prevent increased intracranial pressure  - Maintain blood pressure and fluid volume within ordered parameters to optimize cerebral perfusion and minimize risk of hemorrhage  - Monitor temperature, glucose, and sodium. Initiate appropriate interventions as ordered  Outcome: Progressing

## 2025-05-29 NOTE — PLAN OF CARE
Aldo Valdez is A&O x 1.  Lives at [facility].    Max assist with ADLs.  Incontinent of bowel and bladder.  VS:  stable.  Pain: denies.  Plan:  CIWA, cardizem gtt and PO, encourage PO intake, Neb tx PRN  Bed in lowest position, alarms on, and call light within reach.  Continuation of care in place.    Problem: Diabetes/Glucose Control  Goal: Glucose maintained within prescribed range  Description: INTERVENTIONS:  - Monitor Blood Glucose as ordered  - Assess for signs and symptoms of hyperglycemia and hypoglycemia  - Administer ordered medications to maintain glucose within target range  - Assess barriers to adequate nutritional intake and initiate nutrition consult as needed  - Instruct patient on self management of diabetes  Outcome: Progressing     Problem: CARDIOVASCULAR - ADULT  Goal: Maintains optimal cardiac output and hemodynamic stability  Description: INTERVENTIONS:  - Monitor vital signs, rhythm, and trends  - Monitor for bleeding, hypotension and signs of decreased cardiac output  - Evaluate effectiveness of vasoactive medications to optimize hemodynamic stability  - Monitor arterial and/or venous puncture sites for bleeding and/or hematoma  - Assess quality of pulses, skin color and temperature  - Assess for signs of decreased coronary artery perfusion - ex. Angina  - Evaluate fluid balance, assess for edema, trend weights  Outcome: Progressing  Goal: Absence of cardiac arrhythmias or at baseline  Description: INTERVENTIONS:  - Continuous cardiac monitoring, monitor vital signs, obtain 12 lead EKG if indicated  - Evaluate effectiveness of antiarrhythmic and heart rate control medications as ordered  - Initiate emergency measures for life threatening arrhythmias  - Monitor electrolytes and administer replacement therapy as ordered  Outcome: Progressing     Problem: GASTROINTESTINAL - ADULT  Goal: Minimal or absence of nausea and vomiting  Description: INTERVENTIONS:  - Maintain adequate  hydration with IV or PO as ordered and tolerated  - Nasogastric tube to low intermittent suction as ordered  - Evaluate effectiveness of ordered antiemetic medications  - Provide nonpharmacologic comfort measures as appropriate  - Advance diet as tolerated, if ordered  - Obtain nutritional consult as needed  - Evaluate fluid balance  Outcome: Progressing  Goal: Maintains or returns to baseline bowel function  Description: INTERVENTIONS:  - Assess bowel function  - Maintain adequate hydration with IV or PO as ordered and tolerated  - Evaluate effectiveness of GI medications  - Encourage mobilization and activity  - Obtain nutritional consult as needed  - Establish a toileting routine/schedule  - Consider collaborating with pharmacy to review patient's medication profile  Outcome: Progressing     Problem: HEMATOLOGIC - ADULT  Goal: Maintains hematologic stability  Description: INTERVENTIONS  - Assess for signs and symptoms of bleeding or hemorrhage  - Monitor labs and vital signs for trends  - Administer supportive blood products/factors, fluids and medications as ordered and appropriate  - Administer supportive blood products/factors as ordered and appropriate  Outcome: Progressing  Goal: Free from bleeding injury  Description: (Example usage: patient with low platelets)  INTERVENTIONS:  - Avoid intramuscular injections, enemas and rectal medication administration  - Ensure safe mobilization of patient  - Hold pressure on venipuncture sites to achieve adequate hemostasis  - Assess for signs and symptoms of internal bleeding  - Monitor lab trends  - Patient is to report abnormal signs of bleeding to staff  - Avoid use of toothpicks and dental floss  - Use electric shaver for shaving  - Use soft bristle tooth brush  - Limit straining and forceful nose blowing  Outcome: Progressing     Problem: Patient Centered Care  Goal: Patient preferences are identified and integrated in the patient's plan of care  Description:  Interventions:  - What would you like us to know as we care for you?   - Provide timely, complete, and accurate information to patient/family  - Incorporate patient and family knowledge, values, beliefs, and cultural backgrounds into the planning and delivery of care  - Encourage patient/family to participate in care and decision-making at the level they choose  - Honor patient and family perspectives and choices  Outcome: Progressing     Problem: Patient/Family Goals  Goal: Patient/Family Long Term Goal  Description: Patient's Long Term Goal: Discharge Home    Interventions:  - Plan of care compliance; medication management, care, and education   - See additional Care Plan goals for specific interventions  Outcome: Progressing  Goal: Patient/Family Short Term Goal  Description: Patient's Short Term Goal: remain free from bleeding    Interventions:   - Plan of care compliance; medication management, care, and education   - See additional Care Plan goals for specific interventions  Outcome: Progressing     Problem: Delirium  Goal: Minimize duration of delirium  Description: Interventions:  - Encourage use of hearing aids, eye glasses  - Promote highest level of mobility daily  - Provide frequent reorientation  - Promote wakefulness i.e. lights on, blinds open  - Promote sleep, encourage patient's normal rest cycle i.e. lights off, TV off, minimize noise and interruptions  - Encourage family to assist in orientation and promotion of home routines  Outcome: Progressing     Problem: RESPIRATORY - ADULT  Goal: Achieves optimal ventilation and oxygenation  Description: INTERVENTIONS:  - Assess for changes in respiratory status  - Assess for changes in mentation and behavior  - Position to facilitate oxygenation and minimize respiratory effort  - Oxygen supplementation based on oxygen saturation or ABGs  - Provide Smoking Cessation handout, if applicable  - Encourage broncho-pulmonary hygiene including cough, deep breathe,  Incentive Spirometry  - Assess the need for suctioning and perform as needed  - Assess and instruct to report SOB or any respiratory difficulty  - Respiratory Therapy support as indicated  - Manage/alleviate anxiety  - Monitor for signs/symptoms of CO2 retention  Outcome: Progressing     Problem: GENITOURINARY - ADULT  Goal: Absence of urinary retention  Description: INTERVENTIONS:  - Assess patient’s ability to void and empty bladder  - Monitor intake/output and perform bladder scan as needed  - Follow urinary retention protocol/standard of care  - Consider collaborating with pharmacy to review patient's medication profile  - Implement strategies to promote bladder emptying  Outcome: Progressing     Problem: METABOLIC/FLUID AND ELECTROLYTES - ADULT  Goal: Glucose maintained within prescribed range  Description: INTERVENTIONS:  - Monitor Blood Glucose as ordered  - Assess for signs and symptoms of hyperglycemia and hypoglycemia  - Administer ordered medications to maintain glucose within target range  - Assess barriers to adequate nutritional intake and initiate nutrition consult as needed  - Instruct patient on self management of diabetes  Outcome: Progressing  Goal: Electrolytes maintained within normal limits  Description: INTERVENTIONS:  - Monitor labs and rhythm and assess patient for signs and symptoms of electrolyte imbalances  - Administer electrolyte replacement as ordered  - Monitor response to electrolyte replacements, including rhythm and repeat lab results as appropriate  - Fluid restriction as ordered  - Instruct patient on fluid and nutrition restrictions as appropriate  Outcome: Progressing  Goal: Hemodynamic stability and optimal renal function maintained  Description: INTERVENTIONS:  - Monitor labs and assess for signs and symptoms of volume excess or deficit  - Monitor intake, output and patient weight  - Monitor urine specific gravity, serum osmolarity and serum sodium as indicated or ordered  -  Monitor response to interventions for patient's volume status, including labs, urine output, blood pressure (other measures as available)  - Encourage oral intake as appropriate  - Instruct patient on fluid and nutrition restrictions as appropriate  Outcome: Progressing     Problem: SKIN/TISSUE INTEGRITY - ADULT  Goal: Skin integrity remains intact  Description: INTERVENTIONS  - Assess and document risk factors for pressure ulcer development  - Assess and document skin integrity  - Monitor for areas of redness and/or skin breakdown  - Initiate interventions, skin care algorithm/standards of care as needed  Outcome: Progressing     Problem: MUSCULOSKELETAL - ADULT  Goal: Return mobility to safest level of function  Description: INTERVENTIONS:  - Assess patient stability and activity tolerance for standing, transferring and ambulating w/ or w/o assistive devices  - Assist with transfers and ambulation using safe patient handling equipment as needed  - Ensure adequate protection for wounds/incisions during mobilization  - Obtain PT/OT consults as needed  - Advance activity as appropriate  - Communicate ordered activity level and limitations with patient/family  Outcome: Progressing  Goal: Maintain proper alignment of affected body part  Description: INTERVENTIONS:  - Support and protect limb and body alignment per provider's orders  - Instruct and reinforce with patient and family use of appropriate assistive device and precautions (e.g. spinal or hip dislocation precautions)  Outcome: Progressing     Problem: NEUROLOGICAL - ADULT  Goal: Achieves stable or improved neurological status  Description: INTERVENTIONS  - Assess for and report changes in neurological status  - Initiate measures to prevent increased intracranial pressure  - Maintain blood pressure and fluid volume within ordered parameters to optimize cerebral perfusion and minimize risk of hemorrhage  - Monitor temperature, glucose, and sodium. Initiate  appropriate interventions as ordered  Outcome: Progressing

## 2025-05-29 NOTE — CM/SW NOTE
CM confirmed w/patient's guardian Mica that patient's spouse  and that [place] alone [place] at [facility] prior to admission. Mica notified CM that he and his team were in the process of moving [facility] [place] prior to admission. Last dc recommendation was home w/HH on . Mica requesting [facility] placement at dc as patient is not safe to dc home alone. Mica requested short [facility] stay to bridge patient to his new living environment in memory care. [facility] review requested. [facility] referrals sent in A[facility]IN. List of accepting facilities will be needed for choice.    PASRR level 1 screen completed and uploaded to aidin referral.    2025 at 1105: List of accepting [facility] facilities emailed to patient's guardian Mica at [email or web address] per his request. Mica agreeable to review list and provide choice when able. Weekend SW/CM to follow up w/Mica for choice.    Plan: [facility] pending [facility] review, facility choice, and medical clearance.    Sanjay Perez, LATIAN    [phone]

## 2025-05-29 NOTE — SLP NOTE
SPEECH DAILY NOTE - INPATIENT    ASSESSMENT & PLAN   ASSESSMENT      Proper PPE worn. Hands sanitized upon entrance/exit Pt room.         Pt alert, afebrile and on room air . Pt seen for swallow analysis per ongoing swallow recommendations (after consulting with RN). Pt agreed to participate; seen following a respiratory treatment. Pt's preferred method of learning verbal. Pt placed upright in bed; observed with current diet of minced & moist/mildly-thick liquids for monitoring diet tolerance. Additionally, Pt seen with bite size and thin liquid trials for candidacy of diet upgrade. Prolonged mastication on bite  size food. Functional mastication on minced & moist consistency. Lingual skills functional for bolus control of all trials. No significant oral retention. Pharyngeal response appeared to trigger within 1-2 sec per hyolaryngeal elevation to completion (functional rise/strength per palpation). No overt CSA on minced & moist, bite size nor mildly-thick liquid trials. Coughing S/P thin liquid trials via controlled open cup. No diet upgrade. Collaborated with RN regarding Pt's swallowing plan of care. No report of difficulty taking meds. Sp02 ~89% during this session. Call light within Pt's reach upon SLP discharge from room.        CXR 5/28/25:  CONCLUSION:   Stable cardiomegaly with improved bilateral perihilar interstitial opacity.             PLAN:    To f/u x1-2 sessions to ensure safe intake of diet and reinforce swallowing/aspiration precautions. Diet upgrade as tolerated. VFSS IF appropriate. Family education as available.        Diet Recommendations - Solids: Mechanical soft ground/ Minced & Moist  Diet Recommendations - Liquids: Nectar thick liquids/ Mildly thick    Compensatory Strategies Recommended: Alternate consistencies, Multiple swallows  Aspiration Precautions: Upright position, Slow rate, Small bites, Small sips, No straw, 1:1 feeding  Medication Administration Recommendations: Crushed in  puree    Patient Experiencing Pain: No    Treatment Plan  Treatment Plan/Recommendations: Aspiration precautions  Interdisciplinary Communication: Discussed with RN  Plan posted at bedside      GOALS  Goal #1 Patient will participate in ongoing clinical bedside swallow evaluation as appropriate.   GOAL MET 5/26     Goal #2 The patient will tolerate minced and moist consistency and mildly thick liquids without overt signs or symptoms of aspiration with 100 % accuracy over 2 session(s).    No CSA on current diet.        In Progress   Goal #3 The patient/family/caregiver will demonstrate understanding and implementation of aspiration precautions and swallow strategies independently over 4-5 session(s).    Swallowing precautions posted in room.     In Progress   Goal #4 The patient will tolerate trial upgrade of bite sized/ETC/regular consistency and thin liquids without overt signs or symptoms of aspiration with 100 % accuracy over 4-5 session(s).    No diet upgrade during this session.     In Progress   Goal #5 The patient will utilize compensatory strategies as outlined by  BSSE (clinical evaluation) including Slow rate, Small bites, Small sips, Alternate liquids/solids, No straws, Upright 90 degrees, Eliminate distractions, Feed patient with 1:1 feed assistance 100 % of the time across 2 sessions.    SLP fed Pt; strategies executed.     In Progress   FOLLOW UP  Follow Up Needed (Documentation Required): Yes  SLP Follow-up Date: 05/30/25  Duration: 1 week    Session: 2 S/P RE-BSE    If you have any questions, please contact   Azul Andrea M.S. [facility]/SLP  Speech-Language Pathologist  [facility]  #[phone]

## 2025-05-30 PROBLEM — F39 EPISODIC MOOD DISORDER: Status: ACTIVE | Noted: 2025-01-01

## 2025-05-30 PROBLEM — F10.10 ALCOHOL ABUSE, CONTINUOUS: Status: ACTIVE | Noted: 2025-01-01

## 2025-05-30 PROBLEM — F41.1 GENERALIZED ANXIETY DISORDER: Status: ACTIVE | Noted: 2025-01-01

## 2025-05-30 PROBLEM — F05 DELIRIUM DUE TO ANOTHER MEDICAL CONDITION: Status: ACTIVE | Noted: 2025-01-01

## 2025-05-30 NOTE — CONSULTS
[facility]  part of [facility]    Report of Consultation    Aldo Valdez Patient Status:  Inpatient    1945 MRN [id number]   Location [facility] 2W/SW Attending Pedro Arrieta DO   Hosp Day # 8 PCP Wyatt Ross DO     Date of Admission:  2025  Date of Consult:  2025   Reason for Consultation:   Patient presented with increased confusion, restlessness, agitation, Pedro Perry DO requested psychiatric consult for evaluation and advice.    Consult Duration     The patient seen for initial psychiatric consult evaluation.   Record reviewed, communication with attending, communication with RN and patient seen face to face evaluation.    History of Present Illness:   Patient is a 79 year old   male with history of HTN, DM2, A-fib, CVA and history of dementia who presented to the hospital from [facility] for recent fall and found with low hemoglobin of 6.4.  Patient admitted to the PCU.  Patient has been demonstrating confusion and agitation.  Patient started on CIWA protocol and psych consult requested for evaluation and advice.    According to reviewing chart patient came to the hospital via EMS for bloody bowel movement and recent fall.  Hemoglobin was 6.4.  Endoscopy/colonoscopy discover 1 polyp with no active bleeding.  Patient received 1 unit PRBC.  Patient on  was coded the below with asystole.  Patient intubated with a presumed allergy to lisinopril.  Patient extubated on  and patient started to demonstrate worsening tremors.  Neuro consult provide him with Ativan and had CT scan indicated old lacunar infarct.  Patient last night was confused and agitated and mentioning wine.  Patient started on CIWA protocol and psych consult requested.    The patient seen today in his room.  Patient was initially restless and anxious but slightly and gradually improving in his communication and information.    Patient reported  that he is in the hospital and today is May 28, 2025.  Patient reported that he came to the hospital because he fell on his stomach and he feel he possibly because bleeding in his stomach.  Patient today reporting \"I am ashamed to say I drink too much and happy hour and Memorial Day.  Something I am not proud of\".    The patient elaborating on his drinking that he tend to like wine but he does not drink every day.  Patient reported that he has never exhibited withdrawal symptoms or he has any seizure.  Patient reported that he lost his wife 3 years ago.      Patient today reported that he has been on Valium for many years due to anxiety.  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  Patient reported that he used to like excitement and thrives on action.  Patient reported that he also have whitecoat syndrome.  Patient admitted that alcohol at time calm his anxiety and make him more social.    The patient today has not been in restraint.  Patient denying any auditory or visual hallucination.  Patient during evaluation demonstrating intermittent tremors and patient reporting it usually come with anxiety.  Patient denied hopelessness or helplessness.  Patient reporting that he is not returning back to drinking anymore after he \"embarrass myself\".    The patient who has been demonstrating delirious episode has been demonstrating reasonable improvement gradually.  Patient definitely with anxiety and will benefit from alternative approach of treatment.  Patient today agreeable to challenge the dosage of diazepam and work with me.      Past Psychiatric/Medication History:  1. Prior diagnoses: Generalized anxiety disorder.  2. Past psychiatric inpatient: No psych admission reported  3. Past outpatient history: PCP  4. Past suicide history: Denied any  5. Medication history: Diazepam    Social History:   Patient is a  male lives in care home  home.  Patient's POA is Clme Naidu.  Patient with history of CVA.  Questionable diagnosis of dementia.  Patient denying history of substance abuse otherwise admitted to drinking socially with recent excessive drinking.    Family History:  Patient denies psychiatric family history  Medical History:   Past Medical History  Past Medical History[1]    Past Surgical History  Past Surgical History[2]    Family History  Family History[3]    Social History  Short Social Hx on File[4]        Current Medications:  Current Hospital Medications[5]  Prescriptions Prior to Admission[6]    Allergies  Allergies[7]    Review of Systems:   As by Admitting/Attending    Results:   Laboratory Data:  Lab Results   Component Value Date    WBC 6.8 05/27/2025    HGB 8.6 (L) 05/30/2025    HCT 27.8 (L) 05/30/2025    .0 05/27/2025    CREATSERUM 1.23 05/30/2025    BUN 26 (H) 05/30/2025     05/30/2025    K 3.7 05/30/2025    K 3.7 05/30/2025     05/30/2025    CO2 25.0 05/30/2025     (H) 05/30/2025    CA 7.9 (L) 05/30/2025    ALB 3.4 05/30/2025    ALKPHO 64 05/30/2025    TP 6.4 05/30/2025    AST 39 (H) 05/30/2025    ALT 46 05/30/2025    PTT 48.8 (H) 05/22/2025    INR 1.47 (H) 05/23/2025    PTP 18.6 (H) 05/23/2025    T4F 0.8 05/29/2025    TSH 1.122 05/29/2025    MG 2.1 05/30/2025    B12 477 05/29/2025         Imaging:  XR CHEST AP PORTABLE  (CPT=71045)  Result Date: 5/28/2025  CONCLUSION:   Stable cardiomegaly with improved bilateral perihilar interstitial opacity.    Dictated by (CST): Kendell Wick MD on 5/28/2025 at 12:01 PM     Finalized by (CST): Kendell Wick MD on 5/28/2025 at 12:02 PM            Vital Signs:   Blood pressure 141/87, pulse 109, temperature 99.3 °F (37.4 °C), resp. rate 24, height 69\", weight 70 kg (154 lb 5.2 oz), SpO2 94%.    Mental Status Exam:   Appearance: Stated age male, in hospital gown, laying down in hospital bed.  Psychomotor: Patient has been demonstrating restlessness and  agitation.  Otherwise appropriate during evaluation  Orientation: Alert and oriented to person, place and date.  Patient by report has been confused  Gait: Not evaluated.  Attitude/Coorperation: Patient presented attentive and cooperative  Behavior: episodes of restlessness and agitation.  Otherwise improvement today  Speech: Regular rate and rhythm speech.  Mood: Patient admitted that he has been feeling anxious.  Affect: Anxious affect congruent with mood.  Thought process: Confused has been reported otherwise the patient demonstrated some improvement today  Thought content: No reports of  suicidal or homicidal ideation.  Perceptions: Patient has been demonstrating response to internal stimuli.  Concentration: Grossly distracted  Memory: Grossly impaired with some improvement  Intellect: Average.  Judgment and Insight: Questionable.     Impression:     Episodic mood disorder.  Delirium due to another medical condition.  Generalized anxiety disorder.  Acute gastrointestinal hemorrhage  Tremor  Mixed Alzheimer and vascular dementia (HCC)      The patient is a 79-year-old   male with history of A-fib, HTN, DM2 and history of CVA who presented to the hospital with fall and bloody bowel movement.  Patient found with hemoglobin of 6.4.  Patient during this admission started to demonstrate alternation in his mood, episode of confusion, restlessness, agitation hallucinating.  Patient started on CIWA after he mentioning wine.    The patient seen today in his room and the patient demonstrating improvement in his cognitive function considering his condition previously.  Patient admitting history of anxiety.  Patient admitting some sign of depression.  Patient endorsing the fact that he have history of social drinking with recent excessive drinking on memorial day and recent fall with abdominal pain.    The patient has been demonstrating delirium episode superimposed on anxiety, depression and alcohol abuse with  some improvement.    Discussed risk and benefit, acknowledging the current symptom and severity.  At this point, I would recommend the following approach:     Focus on safety  Focus on education and support.  Focus on insight orientation helping the patient understand diagnosis and treatment plan.  Lower diazepam to 2 mg twice daily.  Start Remeron 7.5 mg nightly.  Start thiamine 100 mg daily.  Discontinue CIWA protocol.  The patient would benefit from neuropsych testing a month from today considering improvement in his physical and cognitive function and returning to his baseline.  Processed with patient at length, the initiation of the above psychotropic medications I advised the patient of the risks, benefits, alternatives and potential side effects. The patient consents to administration of the medications and understands the right to refuse medications at any time. The patient verbalized understanding.   Coordinate plan with team    Orders This Visit:  Orders Placed This Encounter   Procedures    CBC With Differential With Platelet    Comp Metabolic Panel (14)    Hemoglobin    Prothrombin Time (PT)    PTT, Activated    Comp Metabolic Panel (14)    CBC With Differential With Platelet    Prothrombin Time (PT)    Hemoglobin & Hematocrit    Basic Metabolic Panel (8)    Comp Metabolic Panel (14)    Arterial blood gas    Hemoglobin    CBC With Differential With Platelet    Comp Metabolic Panel (14)    Expanded Arterial Blood Gas    CBC With Differential With Platelet    Triglycerides    Hemoglobin A1C    CBC With Differential With Platelet    Basic Metabolic Panel (8)    CBC With Differential With Platelet    Basic Metabolic Panel (8)    Basic Metabolic Panel (8)    TSH and Free T4    Vitamin B12    Magnesium    Potassium    Magnesium    Hemoglobin & Hematocrit    Basic Metabolic Panel (8)    Type and screen    ABORH (Blood Type)    Antibody Screen    ABORH Confirmation    Prepare RBC Once    Prepare RBC Once     Prepare RBC STAT    Specimen to Pathology Tissue    MRSA Screen by PCR       Meds This Visit:  Requested Prescriptions      No prescriptions requested or ordered in this encounter       Nicola Stone MD  5/30/2025    Note to Patient: The 21st Century Cures Act makes medical notes like these available to patients in the interest of transparency. However, be advised this is a medical document. It is intended as peer to peer communication. It is written in medical language and may contain abbreviations or verbiage that are unfamiliar. It may appear blunt or direct. Medical documents are intended to carry relevant information, facts as evident, and the clinical opinion of the practitioner. This note may have been transcribed using a voice dictation system. Voice recognition errors may occur. This should not be taken to alter the content or meaning of this note.            [1]   Past Medical History:   A-fib (HCC)    CVA (cerebral vascular accident) (HCC)    Diabetes (HCC)   [2]   Past Surgical History:  Procedure Laterality Date    Colonoscopy N/A 5/22/2025    Procedure: COLONOSCOPY;  Surgeon: eCsar Byrne MD;  Location: [facility] ENDOSCOPY   [3] No family history on file.  [4]   Social History  Socioeconomic History    Marital status:    Tobacco Use    Smoking status: Former     Types: Cigarettes     Passive exposure: Never    Smokeless tobacco: Never   Vaping Use    Vaping status: Never Used   Substance and Sexual Activity    Alcohol use: Yes     Alcohol/week: 6.0 - 7.0 standard drinks of alcohol     Types: 3 - 4 Standard drinks or equivalent, 3 Cans of beer per week     Social Drivers of Health     Food Insecurity: Patient Unable To Answer (5/28/2025)    NCSS - Food Insecurity     Worried About Running Out of Food in the Last Year: Patient unable to answer     Ran Out of Food in the Last Year: Patient unable to answer   Transportation Needs: Patient Unable To Answer (5/28/2025)    NCSS - Transportation      Lack of Transportation: Patient unable to answer   Housing Stability: Patient Unable To Answer (5/28/2025)    NCSS - Housing/Utilities     Has Housing: Patient unable to answer     Worried About Losing Housing: Patient unable to answer     Unable to Get Utilities: Patient unable to answer   [5]   Current Facility-Administered Medications   Medication Dose Route Frequency    dilTIAZem (cardIZEM) tab 90 mg  90 mg Oral 4 times per day    thiamine (Vitamin B1) tab 100 mg  100 mg Oral Daily    diazePAM (Valium) tab 2 mg  2 mg Oral BID    apixaban (Eliquis) tab 5 mg  5 mg Oral BID    ergocalciferol (Vitamin D2) cap 50,000 Units  50,000 Units Oral Weekly    ipratropium-albuterol (Duoneb) 0.5-2.5 (3) MG/3ML inhalation solution 3 mL  3 mL Nebulization Q6H PRN    insulin aspart (NovoLOG) 100 Units/mL FlexPen 1-5 Units  1-5 Units Subcutaneous TID CC    LORazepam (Ativan) 2 mg/mL injection 0.5 mg  0.5 mg Intravenous Daily PRN    dilTIAZem 10 mg BOLUS FROM BAG infusion  10 mg Intravenous Q1H PRN    dilTIAZem (cardIZEM) 100 mg in sodium chloride 0.9% 100 mL IVPB-ADDV  2.5-20 mg/hr Intravenous Continuous    pantoprazole (Protonix) 40 mg in sodium chloride 0.9% PF 10 mL IV push  40 mg Intravenous Daily    metoprolol tartrate (Lopressor) tab 25 mg  25 mg Oral 2x Daily(Beta Blocker)    acetaminophen (Tylenol) tab 650 mg  650 mg Oral Q4H PRN    ondansetron (Zofran) 4 MG/2ML injection 4 mg  4 mg Intravenous Q6H PRN    metoclopramide (Reglan) 5 mg/mL injection 5 mg  5 mg Intravenous Q8H PRN    glucose (Dex4) 15 GM/59ML oral liquid 15 g  15 g Oral Q15 Min PRN    Or    glucose (Glutose) 40% oral gel 15 g  15 g Oral Q15 Min PRN    Or    glucose-vitamin C (Dex-4) chewable tab 4 tablet  4 tablet Oral Q15 Min PRN    Or    dextrose 50% injection 50 mL  50 mL Intravenous Q15 Min PRN    Or    glucose (Dex4) 15 GM/59ML oral liquid 30 g  30 g Oral Q15 Min PRN    Or    glucose (Glutose) 40% oral gel 30 g  30 g Oral Q15 Min PRN    Or     glucose-vitamin C (Dex-4) chewable tab 8 tablet  8 tablet Oral Q15 Min PRN   [6]   Medications Prior to Admission   Medication Sig    apixaban 5 MG Oral Tab Take 1 tablet (5 mg total) by mouth 2 (two) times daily.    diazePAM 2 MG Oral Tab Take 1 tablet (2 mg total) by mouth daily.    diazePAM 5 MG Oral Tab Take 1 tablet (5 mg total) by mouth at bedtime.    dilTIAZem 90 MG Oral Tab Take 2 tablets (180 mg total) by mouth 2 (two) times daily. HOLD if blood pressure <100 and heart rate <60    lisinopril 30 MG Oral Tab Take 1 tablet (30 mg total) by mouth before bedtime.    lisinopril 40 MG Oral Tab Take 1 tablet (40 mg total) by mouth in the morning.    metFORMIN 500 MG Oral Tab Take 1 tablet (500 mg total) by mouth 2 (two) times daily with meals.    metoprolol succinate ER 50 MG Oral Tablet 24 Hr Take 1 tablet (50 mg total) by mouth in the morning.    ergocalciferol 1.25 MG (80825 UT) Oral Cap Take 1 capsule (50,000 Units total) by mouth once a week. Taking dose on Fridays   [7]   Allergies  Allergen Reactions    Lisinopril ANGIOEDEMA     Per Dr. Fonseca, continue to hold Lisinopril as Pt is an unreliable historian & Pt experiences cardiopulmonary arrest in which angioedema from Lisinopril is suspected;

## 2025-05-30 NOTE — PROGRESS NOTES
[facility]  part of [facility]    Cardiology Progress Note    Aldo Valdez Patient Status:  Inpatient    1945 MRN [id number]   Location [facility] 2W/SW Attending Edward Pa MD   Hosp Day # 8 PCP Wyatt Ross DO         Impression/Plan:  79 year old male presenting with:     1. witnessed bradycardic CV arrest (25), +throat swelling- anaphylactic reaction? (unknown culprit medication)--> steroids/benadryl, holding lisinopril     2. pAF on apixaban--> GIB, Hg 6.4 s/p prbc transfusion--> AC held, EGD/C-scope 25: diverticulosis (likely cause), polyp s/p cautery/snare.     - TTE this admission with normal LV function, no significant valve disease  - Cont metoprolol 25mg bid for rate control; increase oral dilitazem again, wean dilt gtt as tolerated.  Titrate po rate control agents as needed  - Anticoagulation for stroke prevention remains on hold; per GI note , resume as able  - IV lasix PRN  - Monitor on tele  - Will follow       Subjective:     Denies chest pain or dyspnea. Gurgling sounds, felt to be upper airway. Low grade temp overnight.      Problem List[1]    Objective:   Temp: 99.3 °F (37.4 °C)  Pulse: 104  Resp: 33  BP: 147/80    Intake/Output:     Intake/Output Summary (Last 24 hours) at 2025 0655  Last data filed at 2025 0354  Gross per 24 hour   Intake 1226.76 ml   Output 1140 ml   Net 86.76 ml       Last 3 Weights   25 0500 154 lb 5.2 oz (70 kg)   25 0400 156 lb 8.4 oz (71 kg)   25 0200 151 lb 14.4 oz (68.9 kg)   25 2238 157 lb 6.5 oz (71.4 kg)   25 1426 180 lb (81.6 kg)   10/08/24 1853 180 lb (81.6 kg)   19 0857 188 lb (85.3 kg)       Tele: AF    Physical Exam:    General: Alert and oriented x 3. No apparent distress. No respiratory or constitutional distress.  HEENT: Normocephalic, anicteric sclera, neck supple  Neck: supple  Cardiac: Irregularly irregular  Lungs: Clear without wheezes, rales, rhonchi or  dullness.  Normal excursions and effort.  Abdomen: Soft, non-distended  Extremities: Without clubbing, cyanosis or edema.  Peripheral pulses are 2+.  Neurologic: Alert. No motor or coordinational deficit.  Skin: Warm and dry.     Laboratory/Data:    Labs:           Recent Labs   Lab 05/24/25  0950 05/24/25  1157 05/25/25  0544 05/26/25  0439 05/27/25  0831 05/29/25  0425 05/30/25  0412   WBC 4.4  --  8.3 4.9 6.8  --   --    HGB 9.5*   < > 8.6* 7.0* 7.5* 7.5* 8.6*   MCV 79.8*  --  81.5 83.6 82.0  --   --    .0  --  280.0 270.0 286.0  --   --     < > = values in this interval not displayed.       Recent Labs   Lab 05/27/25  0628 05/27/25  1543 05/28/25  0405 05/29/25  0425 05/30/25  0412   * 145 145 142 144   K 4.2 3.6 3.6 3.2* 3.7  3.7   * 113* 114* 111 111   CO2 21.0 21.0 23.0 23.0 25.0   BUN 48* 44* 34* 27* 26*   CREATSERUM 1.69* 1.66* 1.42* 1.26 1.23   CA 7.8* 8.0* 8.1* 8.0* 7.9*   MG  --   --   --  1.8 2.1   * 133* 116* 125* 130*       Recent Labs   Lab 05/25/25  0544 05/26/25  0439 05/28/25  0405 05/29/25  0425 05/30/25  0412   ALT 22 19 28 43 46   AST 38* 27 24 42* 39*   ALB 3.2 3.2 3.5 3.3 3.4       No results for input(s): \"TROP\" in the last 168 hours.    ECHO:  1. Left ventricle: The cavity size was normal. Wall thickness was normal.      Systolic function was normal. The estimated ejection fraction was 55-60%,      by biplane method of disks. Wall motion is normal; there are no regional      wall motion abnormalities. Unable to assess LV diastolic function due to      heart rhythm.   2. Right ventricle: The cavity size was normal. Systolic function was      normal.   3. Left atrium: The atrial volume was mildly increased.   4. Aortic valve: There was thickening, consistent with sclerosis.   Impressions:  No previous study was available for comparison.           Allergies:   Allergies[2]    Medications:  Current Hospital Medications[3]           [1]   Patient Active Problem  List  Diagnosis    Essential hypertension    Back pain    Pure hypercholesterolemia    Cerebral infarction involving posterior cerebral artery, right (HCC)    Anxiety    Acute gastrointestinal hemorrhage    Tremor    Mixed Alzheimer and vascular dementia (HCC)   [2]   Allergies  Allergen Reactions    Lisinopril ANGIOEDEMA     Per Dr. Fonseca, continue to hold Lisinopril as Pt is an unreliable historian & Pt experiences cardiopulmonary arrest in which angioedema from Lisinopril is suspected;   [3]   Current Facility-Administered Medications   Medication Dose Route Frequency    apixaban (Eliquis) tab 5 mg  5 mg Oral BID    diazePAM (Valium) tab 2 mg  2 mg Oral Daily    diazePAM (Valium) tab 5 mg  5 mg Oral Nightly    ergocalciferol (Vitamin D2) cap 50,000 Units  50,000 Units Oral Weekly    dilTIAZem (cardIZEM) tab 60 mg  60 mg Oral 4 times per day    ipratropium-albuterol (Duoneb) 0.5-2.5 (3) MG/3ML inhalation solution 3 mL  3 mL Nebulization Q6H PRN    LORazepam (Ativan) tab 1 mg  1 mg Oral Q1H PRN    Or    LORazepam (Ativan) tab 2 mg  2 mg Oral Q1H PRN    insulin aspart (NovoLOG) 100 Units/mL FlexPen 1-5 Units  1-5 Units Subcutaneous TID CC    LORazepam (Ativan) 2 mg/mL injection 0.5 mg  0.5 mg Intravenous Daily PRN    dilTIAZem 10 mg BOLUS FROM BAG infusion  10 mg Intravenous Q1H PRN    dilTIAZem (cardIZEM) 100 mg in sodium chloride 0.9% 100 mL IVPB-ADDV  2.5-20 mg/hr Intravenous Continuous    pantoprazole (Protonix) 40 mg in sodium chloride 0.9% PF 10 mL IV push  40 mg Intravenous Daily    metoprolol tartrate (Lopressor) tab 25 mg  25 mg Oral 2x Daily(Beta Blocker)    acetaminophen (Tylenol) tab 650 mg  650 mg Oral Q4H PRN    ondansetron (Zofran) 4 MG/2ML injection 4 mg  4 mg Intravenous Q6H PRN    metoclopramide (Reglan) 5 mg/mL injection 5 mg  5 mg Intravenous Q8H PRN    glucose (Dex4) 15 GM/59ML oral liquid 15 g  15 g Oral Q15 Min PRN    Or    glucose (Glutose) 40% oral gel 15 g  15 g Oral Q15 Min PRN    Or     glucose-vitamin C (Dex-4) chewable tab 4 tablet  4 tablet Oral Q15 Min PRN    Or    dextrose 50% injection 50 mL  50 mL Intravenous Q15 Min PRN    Or    glucose (Dex4) 15 GM/59ML oral liquid 30 g  30 g Oral Q15 Min PRN    Or    glucose (Glutose) 40% oral gel 30 g  30 g Oral Q15 Min PRN    Or    glucose-vitamin C (Dex-4) chewable tab 8 tablet  8 tablet Oral Q15 Min PRN

## 2025-05-30 NOTE — PROGRESS NOTES
[facility] Hospitalist Progress Note     CC: Hospital Follow up    PCP: Wyatt Ross DO       Assessment/Plan:     Principal Problem:    Acute gastrointestinal hemorrhage  Active Problems:    Tremor    Mixed Alzheimer and vascular dementia (HCC)    79-year-old male with history of Dementia, CVA, Atrial Fibrillation on Eliquis, Hypertension, type 2 Diabetes, CKD, who presents to the hospital for evaluation of rectal bleeding.  S/p EGD/C-scope 5/23/25.  Hospital course complicated by CODE BLUE was called for increased secretions with tongue swelling and bradycardia which led to asystolic arrest.  CPR was initiated and patient was intubated.  Likely cause was thought to be due to angioedema from lisinopril.  Extubated 5/25/25.      Acute respiratory failure secondary to angioedema resolved   -Thought to be related to ACE inhibitor which is a home medication, I do not see that any was given while in the hospital  -Tongue swelling and secretions are now resolved  Extubated as of 5/25 and currently on room air  -Completed steroids and Benadryl    Tremors , thought to be related to benzodiazepine withdrawal take scheduled Valium at home  -Unclear why he does drink alcohol frequently as well    Rectal bleeding   Acute blood loss anemia  -Hemoglobin now stable  - S/p EGD/C-scope 5/23/25 Colonoscopy with diverticulosis and large pedunculated polyp in the rectosigmoid region that was fully removed with hot cautery snare, bleeding was felt to be more likely diverticular without culprit lesion identified   - PPI IV   - if further bleeding may need IR involvement   - serial Hemoglobin monitoring  - Eliquis resumed 5/29/25 -hemoglobin has been improving     Atrial fibrillation chronic  -Chronically takes diltiazem 180 mg twice daily at home and Toprol 50 XL daily  -Inpatient regiment currently 90 mg every 6  -Metoprolol tartrate 25 twice daily  -Also on diltiazem drip at 5  Cardiology following  - Echo with EF 55-60%  -  resume Eliquis 5/29/25   - iv lasix as needed  -Some congestion in the right lung noted today  Currently on room air     DM  - A1c 5.9, ISS used while on steroids      ZOE on CKD  -Resolved  Hypernatremia revolved      Dementia-chronic could be Alzheimer's type versus alcohol induced  - resides at the [facility] [place]-independent per report  - TSH, b12 wnl   - was in the process of moving [facility] in [place] prior to admission   Plan to go to subacute rehab then to memory care in [place]  - Guardian Juanito requesting NANCY before this transition   PT OT eval ongoing     Fluids: none  Diet: modified   DVT prophylaxis: Eliquis      Dispo: cardiac tele, still requiring dilt drip for rate control, likely discharge over the weekend versus Monday           Code status: Full code    Questions/concerns were discussed with patient and/or family by bedside.      Thank You,  Pedro Arrieta, DO    Hospitalist with [facility]     Subjective:     Feels okay does feel weak denies fevers or chills no chest pain or shortness of breath no nausea    OBJECTIVE:    Blood pressure 138/81, pulse 97, temperature 100.3 °F (37.9 °C), temperature source Temporal, resp. rate 22, height 5' 9\" (1.753 m), weight 154 lb 5.2 oz (70 kg), SpO2 96%.    Temp:  [98.4 °F (36.9 °C)-100.3 °F (37.9 °C)] 100.3 °F (37.9 °C)  Pulse:  [] 97  Resp:  [20-33] 22  BP: (119-158)/() 138/81  SpO2:  [90 %-97 %] 96 %      Intake/Output:    Intake/Output Summary (Last 24 hours) at 5/30/2025 0905  Last data filed at 5/30/2025 0354  Gross per 24 hour   Intake 1037.09 ml   Output 1140 ml   Net -102.91 ml       Last 3 Weights   05/28/25 0500 154 lb 5.2 oz (70 kg)   05/27/25 0400 156 lb 8.4 oz (71 kg)   05/25/25 0200 151 lb 14.4 oz (68.9 kg)   05/22/25 2238 157 lb 6.5 oz (71.4 kg)   05/22/25 1426 180 lb (81.6 kg)   10/08/24 1853 180 lb (81.6 kg)   12/18/19 0857 188 lb (85.3 kg)       Exam   Gen: No acute distress, alert and  oriented x 2-3  Pulm: Congested noted in the right lung  CV: Heart with regular rate and rhythm  Abd: Abdomen soft, nontender, nondistended, bowel sounds present  MSK: no clubbing, no cyanosis        Data Review:       Labs:     Recent Labs   Lab 05/25/25  0544 05/26/25  0439 05/27/25  0831 05/29/25  0425 05/30/25  0412   RBC 3.24* 2.68* 2.89*  --   --    HGB 8.6* 7.0* 7.5* 7.5* 8.6*   HCT 26.4* 22.4* 23.7* 24.5* 27.8*   MCV 81.5 83.6 82.0  --   --    MCH 26.5 26.1 26.0  --   --    MCHC 32.6 31.3 31.6  --   --    RDW 17.4* 18.0* 17.8*  --   --    NEPRELIM 6.87 3.38 5.20  --   --    WBC 8.3 4.9 6.8  --   --    .0 270.0 286.0  --   --          Recent Labs   Lab 05/28/25  0405 05/29/25  0425 05/30/25  0412   * 125* 130*   BUN 34* 27* 26*   CREATSERUM 1.42* 1.26 1.23   EGFRCR 51* 59* 60   CA 8.1* 8.0* 7.9*    142 144   K 3.6 3.2* 3.7  3.7   * 111 111   CO2 23.0 23.0 25.0       Recent Labs   Lab 05/25/25  0544 05/26/25  0439 05/28/25  0405 05/29/25  0425 05/30/25  0412   ALT 22 19 28 43 46   AST 38* 27 24 42* 39*   ALB 3.2 3.2 3.5 3.3 3.4         Imaging:  XR CHEST AP PORTABLE  (CPT=71045)  Result Date: 5/28/2025  CONCLUSION:   Stable cardiomegaly with improved bilateral perihilar interstitial opacity.    Dictated by (CST): Kendell Wick MD on 5/28/2025 at 12:01 PM     Finalized by (CST): Kendell Wick MD on 5/28/2025 at 12:02 PM              Meds:     Scheduled Medications[1]  Medication Infusions[2]  PRN Medications[3]           [1]    dilTIAZem  90 mg Oral 4 times per day    apixaban  5 mg Oral BID    diazePAM  2 mg Oral Daily    diazePAM  5 mg Oral Nightly    ergocalciferol  50,000 Units Oral Weekly    insulin aspart  1-5 Units Subcutaneous TID CC    pantoprazole  40 mg Intravenous Daily    metoprolol tartrate  25 mg Oral 2x Daily(Beta Blocker)   [2]    dilTIAZem 5 mg/hr (05/30/25 0452)   [3]   ipratropium-albuterol    LORazepam **OR** LORazepam    LORazepam    dilTIAZem     acetaminophen    ondansetron    metoclopramide    glucose **OR** glucose **OR** glucose-vitamin C **OR** dextrose **OR** glucose **OR** glucose **OR** glucose-vitamin C

## 2025-05-30 NOTE — PLAN OF CARE
Bed locked in low position, belongings in reach,bed alarm on, call light in reach. Frequent rounding done, all patient needs met. Non-slip socks on/at bedside. Cardizem GTT continues at 5ml/hr, VSS. Patient more awake this morning, able to take AM medications 1 at a time with apple sauce without crushing them.    Problem: Diabetes/Glucose Control  Goal: Glucose maintained within prescribed range  Description: INTERVENTIONS:  - Monitor Blood Glucose as ordered  - Assess for signs and symptoms of hyperglycemia and hypoglycemia  - Administer ordered medications to maintain glucose within target range  - Assess barriers to adequate nutritional intake and initiate nutrition consult as needed  - Instruct patient on self management of diabetes  Outcome: Progressing     Problem: CARDIOVASCULAR - ADULT  Goal: Maintains optimal cardiac output and hemodynamic stability  Description: INTERVENTIONS:  - Monitor vital signs, rhythm, and trends  - Monitor for bleeding, hypotension and signs of decreased cardiac output  - Evaluate effectiveness of vasoactive medications to optimize hemodynamic stability  - Monitor arterial and/or venous puncture sites for bleeding and/or hematoma  - Assess quality of pulses, skin color and temperature  - Assess for signs of decreased coronary artery perfusion - ex. Angina  - Evaluate fluid balance, assess for edema, trend weights  Outcome: Progressing  Goal: Absence of cardiac arrhythmias or at baseline  Description: INTERVENTIONS:  - Continuous cardiac monitoring, monitor vital signs, obtain 12 lead EKG if indicated  - Evaluate effectiveness of antiarrhythmic and heart rate control medications as ordered  - Initiate emergency measures for life threatening arrhythmias  - Monitor electrolytes and administer replacement therapy as ordered  Outcome: Progressing     Problem: GASTROINTESTINAL - ADULT  Goal: Minimal or absence of nausea and vomiting  Description: INTERVENTIONS:  - Maintain adequate  hydration with IV or PO as ordered and tolerated  - Nasogastric tube to low intermittent suction as ordered  - Evaluate effectiveness of ordered antiemetic medications  - Provide nonpharmacologic comfort measures as appropriate  - Advance diet as tolerated, if ordered  - Obtain nutritional consult as needed  - Evaluate fluid balance  Outcome: Progressing  Goal: Maintains or returns to baseline bowel function  Description: INTERVENTIONS:  - Assess bowel function  - Maintain adequate hydration with IV or PO as ordered and tolerated  - Evaluate effectiveness of GI medications  - Encourage mobilization and activity  - Obtain nutritional consult as needed  - Establish a toileting routine/schedule  - Consider collaborating with pharmacy to review patient's medication profile  Outcome: Progressing     Problem: HEMATOLOGIC - ADULT  Goal: Maintains hematologic stability  Description: INTERVENTIONS  - Assess for signs and symptoms of bleeding or hemorrhage  - Monitor labs and vital signs for trends  - Administer supportive blood products/factors, fluids and medications as ordered and appropriate  - Administer supportive blood products/factors as ordered and appropriate  Outcome: Progressing  Goal: Free from bleeding injury  Description: (Example usage: patient with low platelets)  INTERVENTIONS:  - Avoid intramuscular injections, enemas and rectal medication administration  - Ensure safe mobilization of patient  - Hold pressure on venipuncture sites to achieve adequate hemostasis  - Assess for signs and symptoms of internal bleeding  - Monitor lab trends  - Patient is to report abnormal signs of bleeding to staff  - Avoid use of toothpicks and dental floss  - Use electric shaver for shaving  - Use soft bristle tooth brush  - Limit straining and forceful nose blowing  Outcome: Progressing     Problem: Delirium  Goal: Minimize duration of delirium  Description: Interventions:  - Encourage use of hearing aids, eye glasses  -  Promote highest level of mobility daily  - Provide frequent reorientation  - Promote wakefulness i.e. lights on, blinds open  - Promote sleep, encourage patient's normal rest cycle i.e. lights off, TV off, minimize noise and interruptions  - Encourage family to assist in orientation and promotion of home routines  Outcome: Progressing     Problem: RESPIRATORY - ADULT  Goal: Achieves optimal ventilation and oxygenation  Description: INTERVENTIONS:  - Assess for changes in respiratory status  - Assess for changes in mentation and behavior  - Position to facilitate oxygenation and minimize respiratory effort  - Oxygen supplementation based on oxygen saturation or ABGs  - Provide Smoking Cessation handout, if applicable  - Encourage broncho-pulmonary hygiene including cough, deep breathe, Incentive Spirometry  - Assess the need for suctioning and perform as needed  - Assess and instruct to report SOB or any respiratory difficulty  - Respiratory Therapy support as indicated  - Manage/alleviate anxiety  - Monitor for signs/symptoms of CO2 retention  Outcome: Progressing     Problem: GENITOURINARY - ADULT  Goal: Absence of urinary retention  Description: INTERVENTIONS:  - Assess patient’s ability to void and empty bladder  - Monitor intake/output and perform bladder scan as needed  - Follow urinary retention protocol/standard of care  - Consider collaborating with pharmacy to review patient's medication profile  - Implement strategies to promote bladder emptying  Outcome: Progressing     Problem: METABOLIC/FLUID AND ELECTROLYTES - ADULT  Goal: Glucose maintained within prescribed range  Description: INTERVENTIONS:  - Monitor Blood Glucose as ordered  - Assess for signs and symptoms of hyperglycemia and hypoglycemia  - Administer ordered medications to maintain glucose within target range  - Assess barriers to adequate nutritional intake and initiate nutrition consult as needed  - Instruct patient on self management of  diabetes  Outcome: Progressing  Goal: Electrolytes maintained within normal limits  Description: INTERVENTIONS:  - Monitor labs and rhythm and assess patient for signs and symptoms of electrolyte imbalances  - Administer electrolyte replacement as ordered  - Monitor response to electrolyte replacements, including rhythm and repeat lab results as appropriate  - Fluid restriction as ordered  - Instruct patient on fluid and nutrition restrictions as appropriate  Outcome: Progressing  Goal: Hemodynamic stability and optimal renal function maintained  Description: INTERVENTIONS:  - Monitor labs and assess for signs and symptoms of volume excess or deficit  - Monitor intake, output and patient weight  - Monitor urine specific gravity, serum osmolarity and serum sodium as indicated or ordered  - Monitor response to interventions for patient's volume status, including labs, urine output, blood pressure (other measures as available)  - Encourage oral intake as appropriate  - Instruct patient on fluid and nutrition restrictions as appropriate  Outcome: Progressing     Problem: SKIN/TISSUE INTEGRITY - ADULT  Goal: Skin integrity remains intact  Description: INTERVENTIONS  - Assess and document risk factors for pressure ulcer development  - Assess and document skin integrity  - Monitor for areas of redness and/or skin breakdown  - Initiate interventions, skin care algorithm/standards of care as needed  Outcome: Progressing     Problem: MUSCULOSKELETAL - ADULT  Goal: Return mobility to safest level of function  Description: INTERVENTIONS:  - Assess patient stability and activity tolerance for standing, transferring and ambulating w/ or w/o assistive devices  - Assist with transfers and ambulation using safe patient handling equipment as needed  - Ensure adequate protection for wounds/incisions during mobilization  - Obtain PT/OT consults as needed  - Advance activity as appropriate  - Communicate ordered activity level and  limitations with patient/family  Outcome: Progressing  Goal: Maintain proper alignment of affected body part  Description: INTERVENTIONS:  - Support and protect limb and body alignment per provider's orders  - Instruct and reinforce with patient and family use of appropriate assistive device and precautions (e.g. spinal or hip dislocation precautions)  Outcome: Progressing     Problem: NEUROLOGICAL - ADULT  Goal: Achieves stable or improved neurological status  Description: INTERVENTIONS  - Assess for and report changes in neurological status  - Initiate measures to prevent increased intracranial pressure  - Maintain blood pressure and fluid volume within ordered parameters to optimize cerebral perfusion and minimize risk of hemorrhage  - Monitor temperature, glucose, and sodium. Initiate appropriate interventions as ordered  Outcome: Progressing

## 2025-05-30 NOTE — PLAN OF CARE
Aldo Valdez is A&O x 1.  Lives at [facility].    Max assist with ADLs.  Incontinent of bowel and bladder.  VS:  stable.  Pain: denies.  Plan:  CIWA, Cardizem gtt an PO, Neb tx PRN, encourage PO intake; mighty shake, PT/OT  Bed in lowest position, alarms on, and call light within reach.  Continuation of care in place.    Problem: Diabetes/Glucose Control  Goal: Glucose maintained within prescribed range  Description: INTERVENTIONS:  - Monitor Blood Glucose as ordered  - Assess for signs and symptoms of hyperglycemia and hypoglycemia  - Administer ordered medications to maintain glucose within target range  - Assess barriers to adequate nutritional intake and initiate nutrition consult as needed  - Instruct patient on self management of diabetes  Outcome: Progressing     Problem: CARDIOVASCULAR - ADULT  Goal: Maintains optimal cardiac output and hemodynamic stability  Description: INTERVENTIONS:  - Monitor vital signs, rhythm, and trends  - Monitor for bleeding, hypotension and signs of decreased cardiac output  - Evaluate effectiveness of vasoactive medications to optimize hemodynamic stability  - Monitor arterial and/or venous puncture sites for bleeding and/or hematoma  - Assess quality of pulses, skin color and temperature  - Assess for signs of decreased coronary artery perfusion - ex. Angina  - Evaluate fluid balance, assess for edema, trend weights  Outcome: Progressing  Goal: Absence of cardiac arrhythmias or at baseline  Description: INTERVENTIONS:  - Continuous cardiac monitoring, monitor vital signs, obtain 12 lead EKG if indicated  - Evaluate effectiveness of antiarrhythmic and heart rate control medications as ordered  - Initiate emergency measures for life threatening arrhythmias  - Monitor electrolytes and administer replacement therapy as ordered  Outcome: Progressing     Problem: GASTROINTESTINAL - ADULT  Goal: Minimal or absence of nausea and vomiting  Description: INTERVENTIONS:  -  Maintain adequate hydration with IV or PO as ordered and tolerated  - Nasogastric tube to low intermittent suction as ordered  - Evaluate effectiveness of ordered antiemetic medications  - Provide nonpharmacologic comfort measures as appropriate  - Advance diet as tolerated, if ordered  - Obtain nutritional consult as needed  - Evaluate fluid balance  Outcome: Progressing  Goal: Maintains or returns to baseline bowel function  Description: INTERVENTIONS:  - Assess bowel function  - Maintain adequate hydration with IV or PO as ordered and tolerated  - Evaluate effectiveness of GI medications  - Encourage mobilization and activity  - Obtain nutritional consult as needed  - Establish a toileting routine/schedule  - Consider collaborating with pharmacy to review patient's medication profile  Outcome: Progressing     Problem: HEMATOLOGIC - ADULT  Goal: Maintains hematologic stability  Description: INTERVENTIONS  - Assess for signs and symptoms of bleeding or hemorrhage  - Monitor labs and vital signs for trends  - Administer supportive blood products/factors, fluids and medications as ordered and appropriate  - Administer supportive blood products/factors as ordered and appropriate  Outcome: Progressing  Goal: Free from bleeding injury  Description: (Example usage: patient with low platelets)  INTERVENTIONS:  - Avoid intramuscular injections, enemas and rectal medication administration  - Ensure safe mobilization of patient  - Hold pressure on venipuncture sites to achieve adequate hemostasis  - Assess for signs and symptoms of internal bleeding  - Monitor lab trends  - Patient is to report abnormal signs of bleeding to staff  - Avoid use of toothpicks and dental floss  - Use electric shaver for shaving  - Use soft bristle tooth brush  - Limit straining and forceful nose blowing  Outcome: Progressing     Problem: Patient Centered Care  Goal: Patient preferences are identified and integrated in the patient's plan of  care  Description: Interventions:  - What would you like us to know as we care for you? Wife passed away a year ago  - Provide timely, complete, and accurate information to patient/family  - Incorporate patient and family knowledge, values, beliefs, and cultural backgrounds into the planning and delivery of care  - Encourage patient/family to participate in care and decision-making at the level they choose  - Honor patient and family perspectives and choices  Outcome: Progressing     Problem: Patient/Family Goals  Goal: Patient/Family Long Term Goal  Description: Patient's Long Term Goal: Discharge    Interventions:  - Plan of care compliance; medication management, care, and education   - See additional Care Plan goals for specific interventions  Outcome: Progressing  Goal: Patient/Family Short Term Goal  Description: Patient's Short Term Goal: maintain adequate heart rate    Interventions:   - Plan of care compliance; medication management, care, and education   - See additional Care Plan goals for specific interventions  Outcome: Progressing     Problem: Delirium  Goal: Minimize duration of delirium  Description: Interventions:  - Encourage use of hearing aids, eye glasses  - Promote highest level of mobility daily  - Provide frequent reorientation  - Promote wakefulness i.e. lights on, blinds open  - Promote sleep, encourage patient's normal rest cycle i.e. lights off, TV off, minimize noise and interruptions  - Encourage family to assist in orientation and promotion of home routines  Outcome: Progressing     Problem: RESPIRATORY - ADULT  Goal: Achieves optimal ventilation and oxygenation  Description: INTERVENTIONS:  - Assess for changes in respiratory status  - Assess for changes in mentation and behavior  - Position to facilitate oxygenation and minimize respiratory effort  - Oxygen supplementation based on oxygen saturation or ABGs  - Provide Smoking Cessation handout, if applicable  - Encourage  broncho-pulmonary hygiene including cough, deep breathe, Incentive Spirometry  - Assess the need for suctioning and perform as needed  - Assess and instruct to report SOB or any respiratory difficulty  - Respiratory Therapy support as indicated  - Manage/alleviate anxiety  - Monitor for signs/symptoms of CO2 retention  Outcome: Progressing     Problem: GENITOURINARY - ADULT  Goal: Absence of urinary retention  Description: INTERVENTIONS:  - Assess patient’s ability to void and empty bladder  - Monitor intake/output and perform bladder scan as needed  - Follow urinary retention protocol/standard of care  - Consider collaborating with pharmacy to review patient's medication profile  - Implement strategies to promote bladder emptying  Outcome: Progressing     Problem: METABOLIC/FLUID AND ELECTROLYTES - ADULT  Goal: Glucose maintained within prescribed range  Description: INTERVENTIONS:  - Monitor Blood Glucose as ordered  - Assess for signs and symptoms of hyperglycemia and hypoglycemia  - Administer ordered medications to maintain glucose within target range  - Assess barriers to adequate nutritional intake and initiate nutrition consult as needed  - Instruct patient on self management of diabetes  Outcome: Progressing  Goal: Electrolytes maintained within normal limits  Description: INTERVENTIONS:  - Monitor labs and rhythm and assess patient for signs and symptoms of electrolyte imbalances  - Administer electrolyte replacement as ordered  - Monitor response to electrolyte replacements, including rhythm and repeat lab results as appropriate  - Fluid restriction as ordered  - Instruct patient on fluid and nutrition restrictions as appropriate  Outcome: Progressing  Goal: Hemodynamic stability and optimal renal function maintained  Description: INTERVENTIONS:  - Monitor labs and assess for signs and symptoms of volume excess or deficit  - Monitor intake, output and patient weight  - Monitor urine specific gravity,  serum osmolarity and serum sodium as indicated or ordered  - Monitor response to interventions for patient's volume status, including labs, urine output, blood pressure (other measures as available)  - Encourage oral intake as appropriate  - Instruct patient on fluid and nutrition restrictions as appropriate  Outcome: Progressing     Problem: SKIN/TISSUE INTEGRITY - ADULT  Goal: Skin integrity remains intact  Description: INTERVENTIONS  - Assess and document risk factors for pressure ulcer development  - Assess and document skin integrity  - Monitor for areas of redness and/or skin breakdown  - Initiate interventions, skin care algorithm/standards of care as needed  Outcome: Progressing     Problem: MUSCULOSKELETAL - ADULT  Goal: Return mobility to safest level of function  Description: INTERVENTIONS:  - Assess patient stability and activity tolerance for standing, transferring and ambulating w/ or w/o assistive devices  - Assist with transfers and ambulation using safe patient handling equipment as needed  - Ensure adequate protection for wounds/incisions during mobilization  - Obtain PT/OT consults as needed  - Advance activity as appropriate  - Communicate ordered activity level and limitations with patient/family  Outcome: Progressing  Goal: Maintain proper alignment of affected body part  Description: INTERVENTIONS:  - Support and protect limb and body alignment per provider's orders  - Instruct and reinforce with patient and family use of appropriate assistive device and precautions (e.g. spinal or hip dislocation precautions)  Outcome: Progressing     Problem: NEUROLOGICAL - ADULT  Goal: Achieves stable or improved neurological status  Description: INTERVENTIONS  - Assess for and report changes in neurological status  - Initiate measures to prevent increased intracranial pressure  - Maintain blood pressure and fluid volume within ordered parameters to optimize cerebral perfusion and minimize risk of  hemorrhage  - Monitor temperature, glucose, and sodium. Initiate appropriate interventions as ordered  Outcome: Progressing

## 2025-05-31 NOTE — SLP NOTE
ADULT SWALLOWING EVALUATION    ASSESSMENT    ASSESSMENT/OVERALL IMPRESSION:    Proper PPE worn. Hands sanitized upon entrance/exit Pt room.       NEW BSE ordered 2/2  decline in swallow status and changed to NPO status 5/30/25. Pt admitted 5/2225 with acute gastrointestinal hemorrhage. RE-bSE 5/26/25 rec minced & moist/mildly-thick liquids. PMH includes dementia.    CXR  5/31/25:  CONCLUSION:   New left basilar opacities, pneumonia versus aspiration.   Persistent mild pulmonary edema.           Pt alert, on 1L/min 02 NC, afebrile and assessed sitting upright in bed (after consulting with RN). Pt requesting water. No verbal or non-verbal indication of pain. Vocal quality/intensity weak.  Volitional swallow and cough present; considered functional. Oral motor exam revealed overall reduced labial and lingual skills for rate, ROM and strength. Functional dentition. Pt was fed pureed, mildly-thick and thin liquid trials. No gross anterior loss. Slow lingual transit of pureed bolus; attributed to overall weakness. Oral cavity essentially clear post swallows.     Pharyngeal response appeared 2-4 sec delayed per hyolaryngeal elevation to completion (considered weak in strength/rise to palpation). Coughing S/P all trials of all consistencies. Overall swallow function appeared weak with reduced coordination. Sp02 ~93% during this assessment.        IMPRESSIONS:    Pt presents with mild to moderate oral dysphagia and probable pharyngeal dysfunction. Collaborated with RN regarding Pt's swallowing plan of care. BSE results/recommendations discussed with Pt. Pt with reduced understanding; would benefit from ongoing education. Call light within Pt's reach upon SLP discharge from room.          PLAN: Pt to remain NPO with RE-BSE 6/01/25. RN in agreement with plan of care. Oral care to be completed by nursing staff x3 daily.       RECOMMENDATIONS   Diet Recommendations - Solids: NPO  Diet Recommendations - Liquids:  NPO    Compensatory Strategies Recommended:  (N/A)  Aspiration Precautions:  (N/A)  Medication Administration Recommendations: Non-oral  Treatment Plan/Recommendations: SLP to reassess    HISTORY   MEDICAL HISTORY  Reason for Referral: RN dysphagia screen (Extubated)    Problem List  Principal Problem:    Acute gastrointestinal hemorrhage  Active Problems:    Tremor    Mixed Alzheimer and vascular dementia (HCC)    Generalized anxiety disorder    Alcohol abuse, continuous    Episodic mood disorder    Delirium due to another medical condition      Past Medical History  Past Medical History[1]    Diet Prior to Admission: Unknown  Precautions: Aspiration    Patient/Family Goals: to eat/drink water    SWALLOWING HISTORY  Current Diet Consistency: NPO      OBJECTIVE   ORAL MOTOR EXAMINATION  Dentition: Natural, Functional  Symmetry: Within Functional Limits  Strength: Overall reduced     Range of Motion: Overall reduced  Rate of Motion: Reduced    Voice Quality: Hoarse, Weak  Respiratory Status: Supplemental O2, Nasal cannula  Consistencies Trialed: Nectar thick liquids, Thin liquids, Puree  Method of Presentation: Staff/Clinician assistance  Patient Positioned: Upright, Midline    Oral Phase of Swallow: Impaired  Bolus Retrieval: Intact  Bilabial Seal: Impaired  Bolus Formation: Impaired  Bolus Propulsion: Impaired  Mastication:  (N/A)  Retention: Impaired    Pharyngeal Phase of Swallow: Appears Impaired  Laryngeal Elevation Timing: Appears impaired  Laryngeal Elevation Strength: Appears impaired  Laryngeal Elevation Coordination: Appears impaired  (Please note: Silent aspiration cannot be evaluated clinically. Videofluoroscopic Swallow Study is required to rule-out silent aspiration.)      GOALS  Goal #1 Patient will participate in ongoing clinical bedside swallow evaluation as appropriate.   In Progress     Goal #2 The patient will tolerate minced and moist consistency and mildly thick liquids without overt signs or  symptoms of aspiration with 100 % accuracy over 2 session(s).    No CSA on current diet.       GOAL DISCONTINUED 5/31/25      Goal #3 The patient/family/caregiver will demonstrate understanding and implementation of aspiration precautions and swallow strategies independently over 4-5 session(s).    Swallowing precautions posted in room.    GOAL DISCONTINUED 5/31/25      Goal #4 The patient will tolerate trial upgrade of bite sized/ETC/regular consistency and thin liquids without overt signs or symptoms of aspiration with 100 % accuracy over 4-5 session(s).    No diet upgrade during this session.    GOAL DISCONTINUED 5/31/25      Goal #5 The patient will utilize compensatory strategies as outlined by  BSSE (clinical evaluation) including Slow rate, Small bites, Small sips, Alternate liquids/solids, No straws, Upright 90 degrees, Eliminate distractions, Feed patient with 1:1 feed assistance 100 % of the time across 2 sessions.    SLP fed Pt; strategies executed.    GOAL DISCONTINUED 5/31/25   .   FOLLOW UP  Treatment Plan/Recommendations: SLP to reassess  Duration: 1 week  Follow Up Needed (Documentation Required): Yes  SLP Follow-up Date: 06/01/25    Thank you for your referral.   If you have any questions, please contact   Azul Andrea M.S. CCC/SLP  Speech-Language Pathologist  [facility]  #[phone]         [1]   Past Medical History:   A-fib (HCC)    CVA (cerebral vascular accident) (HCC)    Diabetes (HCC)

## 2025-05-31 NOTE — PROGRESS NOTES
[facility] Hospitalist Progress Note     CC: Hospital Follow up    PCP: Wyatt Ross DO       Assessment/Plan:     Principal Problem:    Acute gastrointestinal hemorrhage  Active Problems:    Tremor    Mixed Alzheimer and vascular dementia (HCC)    Generalized anxiety disorder    Alcohol abuse, continuous    Episodic mood disorder    Delirium due to another medical condition    79-year-old male with history of Dementia, CVA, Atrial Fibrillation on Eliquis, Hypertension, type 2 Diabetes, CKD, who presents to the hospital for evaluation of rectal bleeding.  S/p EGD/C-scope 5/23/25.  Hospital course complicated by CODE BLUE was called for increased secretions with tongue swelling and bradycardia which led to asystolic arrest.  CPR was initiated and patient was intubated.  Likely cause was thought to be due to angioedema from lisinopril.  Extubated 5/25/25.      Worsening congestion   -Possible aspiration event overnight   Will ask speech to re-evaluate   Will treat for presumed aspiration PNA with Unasyn   NPO pending eval of speech   CXR reviewed  ABG reviewed   Noted new elevated WBC  Will trend CBC  Now on 1 L   Does sound congested as well will give IV lasix 20 mg x 1     Acute respiratory failure secondary to angioedema resolved   -Thought to be related to ACE inhibitor which is a home medication, I do not see that any was given while in the hospital  -Tongue swelling and secretions are now resolved  Extubated as of 5/25 and currently on room air  -Completed steroids and Benadryl    Tremors , thought to be related to benzodiazepine withdrawal take scheduled Valium at home  -Unclear why he does drink alcohol frequently as well    Rectal bleeding   Acute blood loss anemia  -Hemoglobin now stable  - S/p EGD/C-scope 5/23/25 Colonoscopy with diverticulosis and large pedunculated polyp in the rectosigmoid region that was fully removed with hot cautery snare, bleeding was felt to be more likely diverticular  without culprit lesion identified   - PPI IV   - if further bleeding may need IR involvement   - serial Hemoglobin monitoring has been stable   - Eliquis resumed 5/29/25 -hemoglobin has been improving     Atrial fibrillation chronic  -Chronically takes diltiazem 180 mg twice daily at home and Toprol 50 XL daily  -Inpatient regiment currently 90 mg every 6  -Metoprolol tartrate 25 twice daily  -Also on diltiazem drip at 5  Cardiology following  - Echo with EF 55-60%  - resume Eliquis 5/29/25   - iv lasix as needed- ordered 1 time dose 5/31  -Some congestion in the right lung noted today       DM  - A1c 5.9, ISS used while on steroids      ZOE on CKD  -Resolved  Hypernatremia revolved      Dementia-chronic could be Alzheimer's type versus alcohol induced  - resides at the [facility]-independent per report  - TSH, b12 wnl   - was in the process of moving [facility] in [place] prior to admission   Plan to go to subacute rehab then to memory care in [place]  - Guardian Juanito requesting NANCY before this transition   PT OT eval ongoing     Fluids: none  Diet: modified   DVT prophylaxis: Eliquis      Dispo: cardiac tele, once medically stable      Code status: Full code    Questions/concerns were discussed with patient and/or family by bedside.      Thank You,  Pedro Arrieta, DO    Hospitalist with [facility]     Subjective:       Feels thirsty, some congestion, no fevers or chills, no chest pains     OBJECTIVE:    Blood pressure 139/74, pulse 107, temperature 100.3 °F (37.9 °C), temperature source Oral, resp. rate (!) 34, height 5' 9\" (1.753 m), weight 152 lb 12.5 oz (69.3 kg), SpO2 93%.    Temp:  [98.5 °F (36.9 °C)-100.3 °F (37.9 °C)] 100.3 °F (37.9 °C)  Pulse:  [] 107  Resp:  [22-34] 34  BP: (126-148)/(66-88) 139/74  SpO2:  [80 %-99 %] 93 %      Intake/Output:    Intake/Output Summary (Last 24 hours) at 5/31/2025 0815  Last data filed at 5/31/2025 0445  Gross per 24 hour    Intake 1321.08 ml   Output 1050 ml   Net 271.08 ml       Last 3 Weights   05/31/25 0518 152 lb 12.5 oz (69.3 kg)   05/28/25 0500 154 lb 5.2 oz (70 kg)   05/27/25 0400 156 lb 8.4 oz (71 kg)   05/25/25 0200 151 lb 14.4 oz (68.9 kg)   05/22/25 2238 157 lb 6.5 oz (71.4 kg)   05/22/25 1426 180 lb (81.6 kg)   10/08/24 1853 180 lb (81.6 kg)   12/18/19 0857 188 lb (85.3 kg)       Exam   Gen: No acute distress, alert and oriented x 2-3  Pulm: Congested lungs B/L , no wheezes   CV: Heart with regular rate and rhythm  Abd: Abdomen soft, nontender, nondistended, bowel sounds present  MSK: no clubbing, no cyanosis        Data Review:       Labs:     Recent Labs   Lab 05/26/25  0439 05/27/25  0831 05/29/25 0425 05/30/25 0412 05/31/25  0449 05/31/25  0646   RBC 2.68* 2.89*  --   --   --  3.34*   HGB 7.0* 7.5*   < > 8.6* 8.5* 8.5*   HCT 22.4* 23.7*   < > 27.8* 27.4* 27.9*   MCV 83.6 82.0  --   --   --  83.5   MCH 26.1 26.0  --   --   --  25.4*   MCHC 31.3 31.6  --   --   --  30.5*   RDW 18.0* 17.8*  --   --   --  16.8*   NEPRELIM 3.38 5.20  --   --   --  13.37*   WBC 4.9 6.8  --   --   --  15.5*   .0 286.0  --   --   --  344.0    < > = values in this interval not displayed.         Recent Labs   Lab 05/29/25 0425 05/30/25 0412 05/31/25 0449   * 130* 109*   BUN 27* 26* 26*   CREATSERUM 1.26 1.23 1.30   EGFRCR 59* 60 57*   CA 8.0* 7.9* 8.1*    144 143   K 3.2* 3.7  3.7 3.3*    111 108   CO2 23.0 25.0 25.0       Recent Labs   Lab 05/25/25  0544 05/26/25  0439 05/28/25  0405 05/29/25  0425 05/30/25  0412   ALT 22 19 28 43 46   AST 38* 27 24 42* 39*   ALB 3.2 3.2 3.5 3.3 3.4         Imaging:  XR CHEST AP PORTABLE  (CPT=71045)  Result Date: 5/28/2025  CONCLUSION:   Stable cardiomegaly with improved bilateral perihilar interstitial opacity.    Dictated by (CST): Kendell Wick MD on 5/28/2025 at 12:01 PM     Finalized by (CST): Kendell Wick MD on 5/28/2025 at 12:02 PM              Meds:      Scheduled Medications[1]  Medication Infusions[2]  PRN Medications[3]           [1]    potassium chloride  40 mEq Intravenous Once    dilTIAZem  10 mg Intravenous Once    ampicillin-sulbactam  3 g Intravenous q6h    dilTIAZem  90 mg Oral 4 times per day    thiamine  100 mg Oral Daily    diazePAM  2 mg Oral BID    mirtazapine  7.5 mg Oral Nightly    apixaban  5 mg Oral BID    ergocalciferol  50,000 Units Oral Weekly    insulin aspart  1-5 Units Subcutaneous TID CC    pantoprazole  40 mg Intravenous Daily    metoprolol tartrate  25 mg Oral 2x Daily(Beta Blocker)   [2]    dilTIAZem Stopped (05/30/25 1105)   [3]   ipratropium-albuterol    LORazepam    dilTIAZem    acetaminophen    ondansetron    metoclopramide    glucose **OR** glucose **OR** glucose-vitamin C **OR** dextrose **OR** glucose **OR** glucose **OR** glucose-vitamin C

## 2025-05-31 NOTE — CM/SW NOTE
Department  asked to send updates to [facility] referral for NANCY.    Assigned SW/CM to follow up with patient/family on discharge plan.     Mdadi Delgado, DSC

## 2025-05-31 NOTE — PROGRESS NOTES
Patient is a 79 year old   male with history of HTN, DM2, A-fib, CVA and history of dementia who presented to the hospital from [facility] for recent fall and found with low hemoglobin of 6.4.  Patient admitted to the PCU.  Patient has been demonstrating confusion and agitation.  Patient started on CIWA protocol and psych consult requested for evaluation and advice.  Consult Duration     The patient seen for over 50-minute, follow-up evaluation, over 50% counseling and coordinating care addressing confusion, agitation.    Record reviewed, communication with attending, communication with RN and patient seen face to face evaluation.    History of Present Illness:     Communicating with the team, patient continues to demonstrate alternation in moo and cognition. Patient somewhat lethargic today otherwise aaox3 this morning.     Vital signs: Bp 133/72   Patient receiving valium 2 mg BID, Remeron 7.5 mg,     The patient seen today in his room.    Patient was anxious and restless.    He otherwise is not in restraint.     Patient repeatedly asking for water and refusing to answer questions. Communicating with RN, patient is NPO. Patient may have aspirated overnight.    The patient who has been demonstrating delirious episode has been demonstrating reasonable improvement gradually.  Patient definitely with anxiety and will benefit from alternative approach of treatment.    Past Psychiatric/Medication History:  1. Prior diagnoses: Generalized anxiety disorder.  2. Past psychiatric inpatient: No psych admission reported  3. Past outpatient history: PCP  4. Past suicide history: Denied any  5. Medication history: Diazepam    Social History:   Patient is a  male lives in long term home.  Patient's POA is Clem Naidu.  Patient with history of CVA.  Questionable diagnosis of dementia.  Patient denying history of substance abuse otherwise admitted to drinking socially with  recent excessive drinking.    Family History:  Patient denies psychiatric family history  Medical History:   Past Medical History  Past Medical History[1]    Past Surgical History  Past Surgical History[2]    Family History  Family History[3]    Social History  Short Social Hx on File[4]        Current Medications:  Current Hospital Medications[5]  Prescriptions Prior to Admission[6]    Allergies  Allergies[7]    Review of Systems:   As by Admitting/Attending    Results:   Laboratory Data:  Lab Results   Component Value Date    WBC 15.5 (H) 05/31/2025    HGB 8.5 (L) 05/31/2025    HCT 27.9 (L) 05/31/2025    .0 05/31/2025    CREATSERUM 1.30 05/31/2025    BUN 26 (H) 05/31/2025     05/31/2025    K 3.3 (L) 05/31/2025     05/31/2025    CO2 25.0 05/31/2025     (H) 05/31/2025    CA 8.1 (L) 05/31/2025    ALB 3.4 05/30/2025    ALKPHO 64 05/30/2025    TP 6.4 05/30/2025    AST 39 (H) 05/30/2025    ALT 46 05/30/2025    PTT 48.8 (H) 05/22/2025    INR 1.47 (H) 05/23/2025    PTP 18.6 (H) 05/23/2025    T4F 0.8 05/29/2025    TSH 1.122 05/29/2025    MG 2.1 05/30/2025    B12 477 05/29/2025         Imaging:  XR CHEST AP PORTABLE  (CPT=71045)  Result Date: 5/31/2025  CONCLUSION:   New left basilar opacities, pneumonia versus aspiration.  Persistent mild pulmonary edema.    [facility]      Dictated by (CST): Jose Roberto Lebron MD on 5/31/2025 at 8:21 AM     Finalized by (CST): Jose Roberto Lebron MD on 5/31/2025 at 8:23 AM            Vital Signs:   Blood pressure 133/72, pulse 111, temperature 98.2 °F (36.8 °C), temperature source Oral, resp. rate (!) 33, height 5' 9\" (1.753 m), weight 69.3 kg (152 lb 12.5 oz), SpO2 97%.    Mental Status Exam:   Appearance: Stated age male, in hospital gown, laying down in hospital bed.  Psychomotor: Patient has been demonstrating restlessness and agitation.  Otherwise appropriate during evaluation  Orientation: Alert and oriented to person, place and date.  Patient by report has been  confused  Gait: Not evaluated.  Attitude/Coorperation: Patient presented attentive and cooperative  Behavior: episodes of restlessness and agitation.  Otherwise improvement today  Speech: Regular rate and rhythm speech.  Mood: Patient admitted that he has been feeling anxious.  Affect: Anxious affect congruent with mood.  Thought process: Confused has been reported otherwise the patient demonstrated some improvement today  Thought content: No reports of  suicidal or homicidal ideation.  Perceptions: Patient has been demonstrating response to internal stimuli.  Concentration: Grossly distracted  Memory: Grossly impaired with some improvement  Intellect: Average.  Judgment and Insight: Questionable.     Impression:     Episodic mood disorder.  Delirium due to another medical condition.  Generalized anxiety disorder.  Acute gastrointestinal hemorrhage  Tremor  Mixed Alzheimer and vascular dementia (HCC)      The patient is a 79-year-old   male with history of A-fib, HTN, DM2 and history of CVA who presented to the hospital with fall and bloody bowel movement.  Patient found with hemoglobin of 6.4.  Patient during this admission started to demonstrate alternation in his mood, episode of confusion, restlessness, agitation hallucinating.  Patient started on CIWA after he mentioning wine.    The patient seen today in his room and the patient demonstrating improvement in his cognitive function considering his condition previously.  Patient admitting history of anxiety.  Patient admitting some sign of depression.  Patient endorsing the fact that he have history of social drinking with recent excessive drinking on memorial day and recent fall with abdominal pain.    The patient has been demonstrating delirium episode superimposed on anxiety, depression and alcohol abuse with some improvement.    5/31/2025: patient continues to demonstrate alternation in mood and cognition. Patient aaox3 this morning according to  RN. Patient otherwise anxious and restless and refusing to answer questions stating he needs water. Patient NPO.     Discussed risk and benefit, acknowledging the current symptom and severity.  At this point, I would recommend the following approach:     Focus on safety  Focus on education and support.  Focus on insight orientation helping the patient understand diagnosis and treatment plan.  Continue diazepam to 2 mg twice daily.  Continue Remeron 7.5 mg nightly.  Continue thiamine 100 mg daily.  Utilize ativan 0.5 mg IV q 6 hours PRN  The patient would benefit from neuropsych testing a month from today considering improvement in his physical and cognitive function and returning to his baseline.  Processed with patient at length, the initiation of the above psychotropic medications I advised the patient of the risks, benefits, alternatives and potential side effects. The patient consents to administration of the medications and understands the right to refuse medications at any time. The patient verbalized understanding.   Coordinate plan with team    Orders This Visit:  Orders Placed This Encounter   Procedures    CBC With Differential With Platelet    Comp Metabolic Panel (14)    Hemoglobin    Prothrombin Time (PT)    PTT, Activated    Comp Metabolic Panel (14)    CBC With Differential With Platelet    Prothrombin Time (PT)    Hemoglobin & Hematocrit    Basic Metabolic Panel (8)    Comp Metabolic Panel (14)    Arterial blood gas    Hemoglobin    CBC With Differential With Platelet    Comp Metabolic Panel (14)    Expanded Arterial Blood Gas    CBC With Differential With Platelet    Triglycerides    Hemoglobin A1C    CBC With Differential With Platelet    Basic Metabolic Panel (8)    CBC With Differential With Platelet    Basic Metabolic Panel (8)    Basic Metabolic Panel (8)    TSH and Free T4    Vitamin B12    Magnesium    Potassium    Magnesium    Hemoglobin & Hematocrit    Basic Metabolic Panel (8)    CBC With  Differential With Platelet    Arterial blood gas    Potassium    CBC, Platelet; No Differential    Type and screen    ABORH (Blood Type)    Antibody Screen    ABORH Confirmation    Prepare RBC Once    Prepare RBC Once    Prepare RBC STAT    Specimen to Pathology Tissue    MRSA Screen by PCR       Meds This Visit:  Requested Prescriptions      No prescriptions requested or ordered in this encounter     Christie SinghKYE  5/31/2025    Note to Patient: The 21st Century Cures Act makes medical notes like these available to patients in the interest of transparency. However, be advised this is a medical document. It is intended as peer to peer communication. It is written in medical language and may contain abbreviations or verbiage that are unfamiliar. It may appear blunt or direct. Medical documents are intended to carry relevant information, facts as evident, and the clinical opinion of the practitioner. This note may have been transcribed using a voice dictation system. Voice recognition errors may occur. This should not be taken to alter the content or meaning of this note.            [1]   Past Medical History:   A-fib (HCC)    CVA (cerebral vascular accident) (HCC)    Diabetes (HCC)   [2]   Past Surgical History:  Procedure Laterality Date    Colonoscopy N/A 5/22/2025    Procedure: COLONOSCOPY;  Surgeon: Cesar Byrne MD;  Location: [facility] ENDOSCOPY   [3] No family history on file.  [4]   Social History  Socioeconomic History    Marital status:    Tobacco Use    Smoking status: Former     Types: Cigarettes     Passive exposure: Never    Smokeless tobacco: Never   Vaping Use    Vaping status: Never Used   Substance and Sexual Activity    Alcohol use: Yes     Alcohol/week: 6.0 - 7.0 standard drinks of alcohol     Types: 3 - 4 Standard drinks or equivalent, 3 Cans of beer per week     Social Drivers of Health     Food Insecurity: Patient Unable To Answer (5/28/2025)    NCSS - Food Insecurity     Worried About  Running Out of Food in the Last Year: Patient unable to answer     Ran Out of Food in the Last Year: Patient unable to answer   Transportation Needs: Patient Unable To Answer (5/28/2025)    NCSS - Transportation     Lack of Transportation: Patient unable to answer   Housing Stability: Patient Unable To Answer (5/28/2025)    NCSS - Housing/Utilities     Has Housing: Patient unable to answer     Worried About Losing Housing: Patient unable to answer     Unable to Get Utilities: Patient unable to answer   [5]   Current Facility-Administered Medications   Medication Dose Route Frequency    dilTIAZem (cardIZEM) 25 mg/5mL injection        ampicillin-sulbactam (Unasyn) 3 g in sodium chloride 0.9% 100mL IVPB-ADD  3 g Intravenous q6h    [Held by provider] dilTIAZem (cardIZEM) tab 90 mg  90 mg Oral 4 times per day    thiamine (Vitamin B1) tab 100 mg  100 mg Oral Daily    diazePAM (Valium) tab 2 mg  2 mg Oral BID    mirtazapine (Remeron) tab 7.5 mg  7.5 mg Oral Nightly    apixaban (Eliquis) tab 5 mg  5 mg Oral BID    ergocalciferol (Vitamin D2) cap 50,000 Units  50,000 Units Oral Weekly    ipratropium-albuterol (Duoneb) 0.5-2.5 (3) MG/3ML inhalation solution 3 mL  3 mL Nebulization Q6H PRN    insulin aspart (NovoLOG) 100 Units/mL FlexPen 1-5 Units  1-5 Units Subcutaneous TID CC    LORazepam (Ativan) 2 mg/mL injection 0.5 mg  0.5 mg Intravenous Daily PRN    dilTIAZem 10 mg BOLUS FROM BAG infusion  10 mg Intravenous Q1H PRN    dilTIAZem (cardIZEM) 100 mg in sodium chloride 0.9% 100 mL IVPB-ADDV  2.5-20 mg/hr Intravenous Continuous    pantoprazole (Protonix) 40 mg in sodium chloride 0.9% PF 10 mL IV push  40 mg Intravenous Daily    [Held by provider] metoprolol tartrate (Lopressor) tab 25 mg  25 mg Oral 2x Daily(Beta Blocker)    acetaminophen (Tylenol) tab 650 mg  650 mg Oral Q4H PRN    ondansetron (Zofran) 4 MG/2ML injection 4 mg  4 mg Intravenous Q6H PRN    metoclopramide (Reglan) 5 mg/mL injection 5 mg  5 mg Intravenous Q8H  PRN    glucose (Dex4) 15 GM/59ML oral liquid 15 g  15 g Oral Q15 Min PRN    Or    glucose (Glutose) 40% oral gel 15 g  15 g Oral Q15 Min PRN    Or    glucose-vitamin C (Dex-4) chewable tab 4 tablet  4 tablet Oral Q15 Min PRN    Or    dextrose 50% injection 50 mL  50 mL Intravenous Q15 Min PRN    Or    glucose (Dex4) 15 GM/59ML oral liquid 30 g  30 g Oral Q15 Min PRN    Or    glucose (Glutose) 40% oral gel 30 g  30 g Oral Q15 Min PRN    Or    glucose-vitamin C (Dex-4) chewable tab 8 tablet  8 tablet Oral Q15 Min PRN   [6]   Medications Prior to Admission   Medication Sig    apixaban 5 MG Oral Tab Take 1 tablet (5 mg total) by mouth 2 (two) times daily.    diazePAM 2 MG Oral Tab Take 1 tablet (2 mg total) by mouth daily.    diazePAM 5 MG Oral Tab Take 1 tablet (5 mg total) by mouth at bedtime.    dilTIAZem 90 MG Oral Tab Take 2 tablets (180 mg total) by mouth 2 (two) times daily. HOLD if blood pressure <100 and heart rate <60    lisinopril 30 MG Oral Tab Take 1 tablet (30 mg total) by mouth before bedtime.    lisinopril 40 MG Oral Tab Take 1 tablet (40 mg total) by mouth in the morning.    metFORMIN 500 MG Oral Tab Take 1 tablet (500 mg total) by mouth 2 (two) times daily with meals.    metoprolol succinate ER 50 MG Oral Tablet 24 Hr Take 1 tablet (50 mg total) by mouth in the morning.    ergocalciferol 1.25 MG (44976 UT) Oral Cap Take 1 capsule (50,000 Units total) by mouth once a week. Taking dose on Fridays   [7]   Allergies  Allergen Reactions    Lisinopril ANGIOEDEMA     Per Dr. Fonseca, continue to hold Lisinopril as Pt is an unreliable historian & Pt experiences cardiopulmonary arrest in which angioedema from Lisinopril is suspected;

## 2025-05-31 NOTE — PLAN OF CARE
Pt has a moist cough this am. Night RN placed pt on NPO. MD notified. Seen by speech. Continue NPO. Speech will re-evaluate tomorrow. Lasix given. Pt is lethargic. O2 need decreased throughout the day. Improved cough. Attempted to get patient up in chair today with OT. Pt resisted/refused. Turning q2hr. Cardizem drip resumed since unable to give oral medications.     Problem: Diabetes/Glucose Control  Goal: Glucose maintained within prescribed range  Description: INTERVENTIONS:  - Monitor Blood Glucose as ordered  - Assess for signs and symptoms of hyperglycemia and hypoglycemia  - Administer ordered medications to maintain glucose within target range  - Assess barriers to adequate nutritional intake and initiate nutrition consult as needed  - Instruct patient on self management of diabetes  5/31/2025 1605 by Dona Mendosa, RN  Outcome: Progressing  5/31/2025 1604 by Dona Mendosa, RN  Outcome: Progressing     Problem: CARDIOVASCULAR - ADULT  Goal: Absence of cardiac arrhythmias or at baseline  Description: INTERVENTIONS:  - Continuous cardiac monitoring, monitor vital signs, obtain 12 lead EKG if indicated  - Evaluate effectiveness of antiarrhythmic and heart rate control medications as ordered  - Initiate emergency measures for life threatening arrhythmias  - Monitor electrolytes and administer replacement therapy as ordered  Outcome: Progressing     Problem: GASTROINTESTINAL - ADULT  Goal: Minimal or absence of nausea and vomiting  Description: INTERVENTIONS:  - Maintain adequate hydration with IV or PO as ordered and tolerated  - Nasogastric tube to low intermittent suction as ordered  - Evaluate effectiveness of ordered antiemetic medications  - Provide nonpharmacologic comfort measures as appropriate  - Advance diet as tolerated, if ordered  - Obtain nutritional consult as needed  - Evaluate fluid balance  Outcome: Completed  Goal: Maintains or returns to baseline bowel function  Description:  INTERVENTIONS:  - Assess bowel function  - Maintain adequate hydration with IV or PO as ordered and tolerated  - Evaluate effectiveness of GI medications  - Encourage mobilization and activity  - Obtain nutritional consult as needed  - Establish a toileting routine/schedule  - Consider collaborating with pharmacy to review patient's medication profile  5/31/2025 1605 by Dona Mendosa, RN  Outcome: Progressing  5/31/2025 1604 by Dona Mendosa, RN  Outcome: Progressing     Problem: METABOLIC/FLUID AND ELECTROLYTES - ADULT  Goal: Glucose maintained within prescribed range  Description: INTERVENTIONS:  - Monitor Blood Glucose as ordered  - Assess for signs and symptoms of hyperglycemia and hypoglycemia  - Administer ordered medications to maintain glucose within target range  - Assess barriers to adequate nutritional intake and initiate nutrition consult as needed  - Instruct patient on self management of diabetes  5/31/2025 1605 by Dona Mendosa, RN  Outcome: Progressing  5/31/2025 1604 by Dona Mendosa, RN  Outcome: Progressing     Problem: SKIN/TISSUE INTEGRITY - ADULT  Goal: Skin integrity remains intact  Description: INTERVENTIONS  - Assess and document risk factors for pressure ulcer development  - Assess and document skin integrity  - Monitor for areas of redness and/or skin breakdown  - Initiate interventions, skin care algorithm/standards of care as needed  Outcome: Progressing     Problem: MUSCULOSKELETAL - ADULT  Goal: Return mobility to safest level of function  Description: INTERVENTIONS:  - Assess patient stability and activity tolerance for standing, transferring and ambulating w/ or w/o assistive devices  - Assist with transfers and ambulation using safe patient handling equipment as needed  - Ensure adequate protection for wounds/incisions during mobilization  - Obtain PT/OT consults as needed  - Advance activity as appropriate  - Communicate ordered activity level and limitations with  patient/family  Outcome: Not Progressing  Goal: Maintain proper alignment of affected body part  Description: INTERVENTIONS:  - Support and protect limb and body alignment per provider's orders  - Instruct and reinforce with patient and family use of appropriate assistive device and precautions (e.g. spinal or hip dislocation precautions)  Outcome: Completed

## 2025-05-31 NOTE — PLAN OF CARE
Patient with increased work of breathing and tachypnea overnight, MD notified. ABG, CBC, and CXR ordered. Results pending. Patient made NPO for SLP to re-eval.    Problem: Diabetes/Glucose Control  Goal: Glucose maintained within prescribed range  Description: INTERVENTIONS:  - Monitor Blood Glucose as ordered  - Assess for signs and symptoms of hyperglycemia and hypoglycemia  - Administer ordered medications to maintain glucose within target range  - Assess barriers to adequate nutritional intake and initiate nutrition consult as needed  - Instruct patient on self management of diabetes  Outcome: Progressing     Problem: CARDIOVASCULAR - ADULT  Goal: Maintains optimal cardiac output and hemodynamic stability  Description: INTERVENTIONS:  - Monitor vital signs, rhythm, and trends  - Monitor for bleeding, hypotension and signs of decreased cardiac output  - Evaluate effectiveness of vasoactive medications to optimize hemodynamic stability  - Monitor arterial and/or venous puncture sites for bleeding and/or hematoma  - Assess quality of pulses, skin color and temperature  - Assess for signs of decreased coronary artery perfusion - ex. Angina  - Evaluate fluid balance, assess for edema, trend weights  Outcome: Progressing  Goal: Absence of cardiac arrhythmias or at baseline  Description: INTERVENTIONS:  - Continuous cardiac monitoring, monitor vital signs, obtain 12 lead EKG if indicated  - Evaluate effectiveness of antiarrhythmic and heart rate control medications as ordered  - Initiate emergency measures for life threatening arrhythmias  - Monitor electrolytes and administer replacement therapy as ordered  Outcome: Progressing     Problem: GASTROINTESTINAL - ADULT  Goal: Minimal or absence of nausea and vomiting  Description: INTERVENTIONS:  - Maintain adequate hydration with IV or PO as ordered and tolerated  - Nasogastric tube to low intermittent suction as ordered  - Evaluate effectiveness of ordered antiemetic  medications  - Provide nonpharmacologic comfort measures as appropriate  - Advance diet as tolerated, if ordered  - Obtain nutritional consult as needed  - Evaluate fluid balance  Outcome: Progressing  Goal: Maintains or returns to baseline bowel function  Description: INTERVENTIONS:  - Assess bowel function  - Maintain adequate hydration with IV or PO as ordered and tolerated  - Evaluate effectiveness of GI medications  - Encourage mobilization and activity  - Obtain nutritional consult as needed  - Establish a toileting routine/schedule  - Consider collaborating with pharmacy to review patient's medication profile  Outcome: Progressing     Problem: HEMATOLOGIC - ADULT  Goal: Maintains hematologic stability  Description: INTERVENTIONS  - Assess for signs and symptoms of bleeding or hemorrhage  - Monitor labs and vital signs for trends  - Administer supportive blood products/factors, fluids and medications as ordered and appropriate  - Administer supportive blood products/factors as ordered and appropriate  Outcome: Progressing  Goal: Free from bleeding injury  Description: (Example usage: patient with low platelets)  INTERVENTIONS:  - Avoid intramuscular injections, enemas and rectal medication administration  - Ensure safe mobilization of patient  - Hold pressure on venipuncture sites to achieve adequate hemostasis  - Assess for signs and symptoms of internal bleeding  - Monitor lab trends  - Patient is to report abnormal signs of bleeding to staff  - Avoid use of toothpicks and dental floss  - Use electric shaver for shaving  - Use soft bristle tooth brush  - Limit straining and forceful nose blowing  Outcome: Progressing     Problem: Patient Centered Care  Goal: Patient preferences are identified and integrated in the patient's plan of care  Description: Interventions:  - What would you like us to know as we care for you? I'm from [place].  - Provide timely, complete, and accurate information to patient/family  -  Incorporate patient and family knowledge, values, beliefs, and cultural backgrounds into the planning and delivery of care  - Encourage patient/family to participate in care and decision-making at the level they choose  - Honor patient and family perspectives and choices  Outcome: Progressing     Problem: Delirium  Goal: Minimize duration of delirium  Description: Interventions:  - Encourage use of hearing aids, eye glasses  - Promote highest level of mobility daily  - Provide frequent reorientation  - Promote wakefulness i.e. lights on, blinds open  - Promote sleep, encourage patient's normal rest cycle i.e. lights off, TV off, minimize noise and interruptions  - Encourage family to assist in orientation and promotion of home routines  Outcome: Progressing     Problem: RESPIRATORY - ADULT  Goal: Achieves optimal ventilation and oxygenation  Description: INTERVENTIONS:  - Assess for changes in respiratory status  - Assess for changes in mentation and behavior  - Position to facilitate oxygenation and minimize respiratory effort  - Oxygen supplementation based on oxygen saturation or ABGs  - Provide Smoking Cessation handout, if applicable  - Encourage broncho-pulmonary hygiene including cough, deep breathe, Incentive Spirometry  - Assess the need for suctioning and perform as needed  - Assess and instruct to report SOB or any respiratory difficulty  - Respiratory Therapy support as indicated  - Manage/alleviate anxiety  - Monitor for signs/symptoms of CO2 retention  Outcome: Progressing     Problem: GENITOURINARY - ADULT  Goal: Absence of urinary retention  Description: INTERVENTIONS:  - Assess patient’s ability to void and empty bladder  - Monitor intake/output and perform bladder scan as needed  - Follow urinary retention protocol/standard of care  - Consider collaborating with pharmacy to review patient's medication profile  - Implement strategies to promote bladder emptying  Outcome: Progressing     Problem:  METABOLIC/FLUID AND ELECTROLYTES - ADULT  Goal: Glucose maintained within prescribed range  Description: INTERVENTIONS:  - Monitor Blood Glucose as ordered  - Assess for signs and symptoms of hyperglycemia and hypoglycemia  - Administer ordered medications to maintain glucose within target range  - Assess barriers to adequate nutritional intake and initiate nutrition consult as needed  - Instruct patient on self management of diabetes  Outcome: Progressing  Goal: Electrolytes maintained within normal limits  Description: INTERVENTIONS:  - Monitor labs and rhythm and assess patient for signs and symptoms of electrolyte imbalances  - Administer electrolyte replacement as ordered  - Monitor response to electrolyte replacements, including rhythm and repeat lab results as appropriate  - Fluid restriction as ordered  - Instruct patient on fluid and nutrition restrictions as appropriate  Outcome: Progressing  Goal: Hemodynamic stability and optimal renal function maintained  Description: INTERVENTIONS:  - Monitor labs and assess for signs and symptoms of volume excess or deficit  - Monitor intake, output and patient weight  - Monitor urine specific gravity, serum osmolarity and serum sodium as indicated or ordered  - Monitor response to interventions for patient's volume status, including labs, urine output, blood pressure (other measures as available)  - Encourage oral intake as appropriate  - Instruct patient on fluid and nutrition restrictions as appropriate  Outcome: Progressing     Problem: SKIN/TISSUE INTEGRITY - ADULT  Goal: Skin integrity remains intact  Description: INTERVENTIONS  - Assess and document risk factors for pressure ulcer development  - Assess and document skin integrity  - Monitor for areas of redness and/or skin breakdown  - Initiate interventions, skin care algorithm/standards of care as needed  Outcome: Progressing     Problem: MUSCULOSKELETAL - ADULT  Goal: Return mobility to safest level of  function  Description: INTERVENTIONS:  - Assess patient stability and activity tolerance for standing, transferring and ambulating w/ or w/o assistive devices  - Assist with transfers and ambulation using safe patient handling equipment as needed  - Ensure adequate protection for wounds/incisions during mobilization  - Obtain PT/OT consults as needed  - Advance activity as appropriate  - Communicate ordered activity level and limitations with patient/family  Outcome: Progressing  Goal: Maintain proper alignment of affected body part  Description: INTERVENTIONS:  - Support and protect limb and body alignment per provider's orders  - Instruct and reinforce with patient and family use of appropriate assistive device and precautions (e.g. spinal or hip dislocation precautions)  Outcome: Progressing     Problem: NEUROLOGICAL - ADULT  Goal: Achieves stable or improved neurological status  Description: INTERVENTIONS  - Assess for and report changes in neurological status  - Initiate measures to prevent increased intracranial pressure  - Maintain blood pressure and fluid volume within ordered parameters to optimize cerebral perfusion and minimize risk of hemorrhage  - Monitor temperature, glucose, and sodium. Initiate appropriate interventions as ordered  Outcome: Progressing

## 2025-05-31 NOTE — OCCUPATIONAL THERAPY NOTE
OCCUPATIONAL THERAPY RE-EVALUATION - INPATIENT     Room Number: 239/239-A  Evaluation Date: 5/31/2025  Type of Evaluation: Re-evaluation  Presenting Problem: acute GI hemorrhage    Physician Order: IP Consult to Occupational Therapy  Reason for Therapy: ADL/IADL Dysfunction and Discharge Planning    OCCUPATIONAL THERAPY ASSESSMENT   Patient is a 79 year old male admitted 5/22/2025 for acute GI hemorrhage- re-evaluation after a code blue with cardiac arrest.  Prior to admission, patient's baseline is reported as independent with ADLs and functional mobility.  Patient is currently functioning below baseline with toileting, bathing, upper body dressing, lower body dressing, grooming, eating, bed mobility, transfers, static sitting balance, dynamic sitting balance, static standing balance, dynamic standing balance, functional standing tolerance, and energy conservation strategies.  Patient is requiring maximum assist and dependent as a result of the following impairments: decreased functional strength, decreased endurance, impaired sitting and standing balance, impaired coordination, impaired motor planning, decreased muscular endurance, cognitive deficits (Aox1, difficulty following commands), medical status, decreased compliance/participation, decreased insight to deficits, and decreased safety awareness. Occupational Therapy will continue to follow for duration of hospitalization.    Patient will benefit from continued skilled OT Services to promote return to prior level of function and safety with continuous assistance and gradual rehabilitative therapy.    PLAN DURING HOSPITALIZATION     OT Treatment Plan: Balance activities, Energy conservation/work simplification techniques, ADL training, IADL training, Functional transfer training, UE strengthening/ROM, Endurance training, Patient/Family education, Patient/Family training, Equipment eval/education, Neuromuscluar reeducation, Cognitive reorientation, Visual  perceptual training, Compensatory technique education, Fine motor coordination activities     OCCUPATIONAL THERAPY MEDICAL/SOCIAL HISTORY   Problem List  Principal Problem:    Acute gastrointestinal hemorrhage  Active Problems:    Tremor    Mixed Alzheimer and vascular dementia (HCC)    Generalized anxiety disorder    Alcohol abuse, continuous    Episodic mood disorder    Delirium due to another medical condition    HOME SITUATION  Type of Home: -- ([facility] [place])  Home Layout: One level; Elevator  Patient Regularly Uses: Glasses    SUBJECTIVE  \"I want water.\"    OCCUPATIONAL THERAPY EXAMINATION      OBJECTIVE  Precautions: Bed/chair alarm; Restraints  Fall Risk: High fall risk    PAIN ASSESSMENT  Ratin      COGNITION  Overall Cognitive Status:  Impaired    VISION  Current Vision: wears glasses all the time    PERCEPTION  Overall Perception Status:   MIREYA - unable to assess    SENSATION  Light touch:  unable to assess    Communication: limited communication    Behavioral/Emotional/Social: agitated, uncooperative    RANGE OF MOTION   Upper extremity ROM is within functional limits- would benefit from further testing    STRENGTH ASSESSMENT  Upper extremity strength is within functional limits- would benefit from further testing    COORDINATION  Gross Motor: Impaired  Fine Motor: Impaired    ACTIVITIES OF DAILY LIVING ASSESSMENT  AM-PAC ‘6-Clicks’ Inpatient Daily Activity Short Form  How much help from another person does the patient currently need…  -   Putting on and taking off regular lower body clothing?: Total  -   Bathing (including washing, rinsing, drying)?: Total  -   Toileting, which includes using toilet, bedpan or urinal? : Total  -   Putting on and taking off regular upper body clothing?: A Lot  -   Taking care of personal grooming such as brushing teeth?: A Lot  -   Eating meals?: A Lot    AM-PAC Score:  Score: 9  Approx Degree of Impairment: 79.59%  Standardized Score (AM-PAC Scale): 25.33  CMS  Modifier (G-Code): CL    FUNCTIONAL TRANSFER ASSESSMENT  Sit to Stand: Edge of Bed  Edge of Bed: Contact Guard Assist    BED MOBILITY  Supine to Sit : Dependent  Sit to Supine (OT): Dependent    BALANCE ASSESSMENT  Static Sitting: Maximum Assist    FUNCTIONAL ADL ASSESSMENT  Eating: Maximum Assist  Grooming Seated: Maximum Assist  Bathing Seated: Dependent  UB Dressing Seated: Maximum Assist  LB Dressing Seated: Dependent  Toileting Seated: Dependent    THERAPEUTIC EXERCISE     Skilled Therapy Provided: Chart reviewed and spoke to RN who cleared patient to participate with therapy. Patient received supine in bed. Patient reports no pain at this time and agreeable to participate in OT session. Patient is AOx 0-1 and able to follow directions with significant repetition or not at all. Cognition is impaired. Patient performing ADLs and functional mobility at a max-total assist level this session. Total assist required for supine< sit EOB with max A for sitting balance.  Patient demonstrates functional reach to don socks with total assist. Patient most limited by cognition- resistance to participate in therapy this session. Education provided on body mechanics and how that manifests functionally while completing ADLs and functional mobility. Patient with poor return demonstration on all education.  Patient left supine in bed at end of session with call light within reach. Outcomes of session communicated to RN.     EDUCATION PROVIDED  Patient Education : Role of Occupational Therapy; Plan of Care; Functional Transfer Techniques; Proper Body Mechanics  Patient's Response to Education: Demonstrates Poor Carry Over to Information; Does Not Demonstrate Skills Needed for Learning    The patient's Approx Degree of Impairment: 79.59% has been calculated based on documentation in the [facility] '6 clicks' Inpatient Daily Activity Short Form.  Research supports that patients with this level of impairment may benefit from subacute  rehab.  Final disposition will be made by interdisciplinary medical team.     Patient End of Session: In bed, Needs met, Call light within reach, RN aware of session/findings, All patient questions and concerns addressed, Hospital anti-slip socks, Alarm set, With  staff    OT Goals  Patients self stated goal is: unstated     Patient will complete functional transfer with mod I  Comment:     Patient will complete toileting with mod I  Comment:     Patient will tolerate standing for 3 minutes in prep for adls with mod I   Comment:    Patient will complete item retrieval with mod I  Comment:          Goals  on: 25  Frequency: 3-5x/week    Self-Care Home Management: 10 minutes

## 2025-05-31 NOTE — PROGRESS NOTES
[facility]  part of formerly [facility]    Cardiology Progress Note    Aldo Valdez Patient Status:  Inpatient    1945 MRN [id number]   Location [facility] 2W/SW Attending Pedro Arrieta DO   Hosp Day # 9 PCP Wyatt Ross DO       Impression/Plan:  79 year old male presenting with:     1. witnessed bradycardic CV arrest (25), +throat swelling- anaphylactic reaction? (unknown culprit medication)--> steroids/benadryl, holding lisinopril     2. pAF on apixaban--> GIB, Hg 6.4 s/p prbc transfusion--> AC held, EGD/C-scope 25: diverticulosis (likely cause), polyp s/p cautery/snare.     - TTE this admission with normal LV function, no significant valve disease  - Cont metoprolol, diltiazem for rate control; titrate as needed.  IV agents if patient unable to take po  - Cont eliquis for stroke prevention in AF  - IV lasix PRN; CXR pending for today  - Monitor on tele  - Will follow     Subjective: Patient with tachypnea and increased work of breathing overnight.  Today asking for water.  Denies chest pain, palpitations, dyspnea.    Problem List[1]    Objective:   Temp: 100.3 °F (37.9 °C)  Pulse: 107  Resp: 34  BP: 139/74    Intake/Output:     Intake/Output Summary (Last 24 hours) at 2025 0815  Last data filed at 2025 0445  Gross per 24 hour   Intake 1321.08 ml   Output 1050 ml   Net 271.08 ml       Last 3 Weights   25 0518 152 lb 12.5 oz (69.3 kg)   25 0500 154 lb 5.2 oz (70 kg)   25 0400 156 lb 8.4 oz (71 kg)   25 0200 151 lb 14.4 oz (68.9 kg)   25 2238 157 lb 6.5 oz (71.4 kg)   25 1426 180 lb (81.6 kg)   10/08/24 1853 180 lb (81.6 kg)   19 0857 188 lb (85.3 kg)       Tele: AF, PVCs    Physical Exam:    General: Awake and alert  HEENT: Normocephalic, anicteric sclera, neck supple, no thyromegaly or adenopathy.  Neck: No JVD, carotids 2+, no bruits.  Cardiac: Irregularly irregular  Lungs: Coarse breath sounds bilat  Abdomen: Soft,  non-tender. No organosplenomegally, mass or rebound, BS-present.  Extremities: Without clubbing, cyanosis or edema.  Peripheral pulses are 2+.  Neurologic: Awake and alert  Skin: Warm and dry.     Laboratory/Data:    Labs:         Recent Labs   Lab 05/24/25  0950 05/24/25  1157 05/25/25  0544 05/26/25  0439 05/27/25  0831 05/29/25  0425 05/30/25  0412 05/31/25 0449 05/31/25  0646   WBC 4.4  --  8.3 4.9 6.8  --   --   --  15.5*   HGB 9.5*   < > 8.6* 7.0* 7.5* 7.5* 8.6* 8.5* 8.5*   MCV 79.8*  --  81.5 83.6 82.0  --   --   --  83.5   .0  --  280.0 270.0 286.0  --   --   --  344.0    < > = values in this interval not displayed.       Recent Labs   Lab 05/27/25  1543 05/28/25  0405 05/29/25  0425 05/30/25 0412 05/31/25 0449    145 142 144 143   K 3.6 3.6 3.2* 3.7  3.7 3.3*   * 114* 111 111 108   CO2 21.0 23.0 23.0 25.0 25.0   BUN 44* 34* 27* 26* 26*   CREATSERUM 1.66* 1.42* 1.26 1.23 1.30   CA 8.0* 8.1* 8.0* 7.9* 8.1*   MG  --   --  1.8 2.1  --    * 116* 125* 130* 109*       Recent Labs   Lab 05/25/25  0544 05/26/25  0439 05/28/25  0405 05/29/25  0425 05/30/25  0412   ALT 22 19 28 43 46   AST 38* 27 24 42* 39*   ALB 3.2 3.2 3.5 3.3 3.4       No results for input(s): \"TROP\" in the last 168 hours.    Allergies:   Allergies[2]    Medications:  Current Hospital Medications[3]    Kaiser Byrne MD  5/31/2025  8:15 AM         [1]   Patient Active Problem List  Diagnosis    Essential hypertension    Back pain    Pure hypercholesterolemia    Cerebral infarction involving posterior cerebral artery, right (HCC)    Anxiety    Acute gastrointestinal hemorrhage    Tremor    Mixed Alzheimer and vascular dementia (HCC)    Generalized anxiety disorder    Alcohol abuse, continuous    Episodic mood disorder    Delirium due to another medical condition   [2]   Allergies  Allergen Reactions    Lisinopril ANGIOEDEMA     Per Dr. Fonseca, continue to hold Lisinopril as Pt is an unreliable historian & Pt experiences  cardiopulmonary arrest in which angioedema from Lisinopril is suspected;   [3]   Current Facility-Administered Medications   Medication Dose Route Frequency    potassium chloride 40 mEq in 250mL sodium chloride 0.9% IVPB premix  40 mEq Intravenous Once    dilTIAZem 10 mg BOLUS FROM BAG infusion  10 mg Intravenous Once    ampicillin-sulbactam (Unasyn) 3 g in sodium chloride 0.9% 100mL IVPB-ADD  3 g Intravenous q6h    dilTIAZem (cardIZEM) tab 90 mg  90 mg Oral 4 times per day    thiamine (Vitamin B1) tab 100 mg  100 mg Oral Daily    diazePAM (Valium) tab 2 mg  2 mg Oral BID    mirtazapine (Remeron) tab 7.5 mg  7.5 mg Oral Nightly    apixaban (Eliquis) tab 5 mg  5 mg Oral BID    ergocalciferol (Vitamin D2) cap 50,000 Units  50,000 Units Oral Weekly    ipratropium-albuterol (Duoneb) 0.5-2.5 (3) MG/3ML inhalation solution 3 mL  3 mL Nebulization Q6H PRN    insulin aspart (NovoLOG) 100 Units/mL FlexPen 1-5 Units  1-5 Units Subcutaneous TID CC    LORazepam (Ativan) 2 mg/mL injection 0.5 mg  0.5 mg Intravenous Daily PRN    dilTIAZem 10 mg BOLUS FROM BAG infusion  10 mg Intravenous Q1H PRN    dilTIAZem (cardIZEM) 100 mg in sodium chloride 0.9% 100 mL IVPB-ADDV  2.5-20 mg/hr Intravenous Continuous    pantoprazole (Protonix) 40 mg in sodium chloride 0.9% PF 10 mL IV push  40 mg Intravenous Daily    metoprolol tartrate (Lopressor) tab 25 mg  25 mg Oral 2x Daily(Beta Blocker)    acetaminophen (Tylenol) tab 650 mg  650 mg Oral Q4H PRN    ondansetron (Zofran) 4 MG/2ML injection 4 mg  4 mg Intravenous Q6H PRN    metoclopramide (Reglan) 5 mg/mL injection 5 mg  5 mg Intravenous Q8H PRN    glucose (Dex4) 15 GM/59ML oral liquid 15 g  15 g Oral Q15 Min PRN    Or    glucose (Glutose) 40% oral gel 15 g  15 g Oral Q15 Min PRN    Or    glucose-vitamin C (Dex-4) chewable tab 4 tablet  4 tablet Oral Q15 Min PRN    Or    dextrose 50% injection 50 mL  50 mL Intravenous Q15 Min PRN    Or    glucose (Dex4) 15 GM/59ML oral liquid 30 g  30 g Oral  Q15 Min PRN    Or    glucose (Glutose) 40% oral gel 30 g  30 g Oral Q15 Min PRN    Or    glucose-vitamin C (Dex-4) chewable tab 8 tablet  8 tablet Oral Q15 Min PRN

## 2025-06-01 NOTE — PLAN OF CARE
Patient NPO, oral medications on hold. IV Cardizem infusing for elevated heart rate. Patient's oxygen saturation above 90%, patient on 0.5 liters of oxygen, continuous pulse ox in place.     Patient had elevated temperature of 100.7 and patient complained of pain. Dr. Barrett ordered IV tylenol for pain. Nurse administered, patient's pain and temperature improved, heart rate improved during shift.    Patient's urine brown, cloudy and odorous. Dr. Barrett ordered UA and urine cultures. UTI positive, Dr. REJI Duckworth ordered IV ceftriaxone and stopped IV Unasyn.     Patient turned frequently while resting in bed. Call light in reach.     Problem: Diabetes/Glucose Control  Goal: Glucose maintained within prescribed range  Description: INTERVENTIONS:  - Monitor Blood Glucose as ordered  - Assess for signs and symptoms of hyperglycemia and hypoglycemia  - Administer ordered medications to maintain glucose within target range  - Assess barriers to adequate nutritional intake and initiate nutrition consult as needed  - Instruct patient on self management of diabetes  Outcome: Progressing     Problem: CARDIOVASCULAR - ADULT  Goal: Maintains optimal cardiac output and hemodynamic stability  Description: INTERVENTIONS:  - Monitor vital signs, rhythm, and trends  - Monitor for bleeding, hypotension and signs of decreased cardiac output  - Evaluate effectiveness of vasoactive medications to optimize hemodynamic stability  - Monitor arterial and/or venous puncture sites for bleeding and/or hematoma  - Assess quality of pulses, skin color and temperature  - Assess for signs of decreased coronary artery perfusion - ex. Angina  - Evaluate fluid balance, assess for edema, trend weights  Outcome: Progressing  Goal: Absence of cardiac arrhythmias or at baseline  Description: INTERVENTIONS:  - Continuous cardiac monitoring, monitor vital signs, obtain 12 lead EKG if indicated  - Evaluate effectiveness of antiarrhythmic and heart rate  control medications as ordered  - Initiate emergency measures for life threatening arrhythmias  - Monitor electrolytes and administer replacement therapy as ordered  Outcome: Progressing     Problem: GASTROINTESTINAL - ADULT  Goal: Maintains or returns to baseline bowel function  Description: INTERVENTIONS:  - Assess bowel function  - Maintain adequate hydration with IV or PO as ordered and tolerated  - Evaluate effectiveness of GI medications  - Encourage mobilization and activity  - Obtain nutritional consult as needed  - Establish a toileting routine/schedule  - Consider collaborating with pharmacy to review patient's medication profile  Outcome: Progressing     Problem: HEMATOLOGIC - ADULT  Goal: Maintains hematologic stability  Description: INTERVENTIONS  - Assess for signs and symptoms of bleeding or hemorrhage  - Monitor labs and vital signs for trends  - Administer supportive blood products/factors, fluids and medications as ordered and appropriate  - Administer supportive blood products/factors as ordered and appropriate  Outcome: Progressing  Goal: Free from bleeding injury  Description: (Example usage: patient with low platelets)  INTERVENTIONS:  - Avoid intramuscular injections, enemas and rectal medication administration  - Ensure safe mobilization of patient  - Hold pressure on venipuncture sites to achieve adequate hemostasis  - Assess for signs and symptoms of internal bleeding  - Monitor lab trends  - Patient is to report abnormal signs of bleeding to staff  - Avoid use of toothpicks and dental floss  - Use electric shaver for shaving  - Use soft bristle tooth brush  - Limit straining and forceful nose blowing  Outcome: Progressing     Problem: Patient Centered Care  Goal: Patient preferences are identified and integrated in the patient's plan of care  Description: Interventions:  - What would you like us to know as we care for you? None stated  - Provide timely, complete, and accurate information  to patient/family  - Incorporate patient and family knowledge, values, beliefs, and cultural backgrounds into the planning and delivery of care  - Encourage patient/family to participate in care and decision-making at the level they choose  - Honor patient and family perspectives and choices  Outcome: Progressing     Problem: Patient/Family Goals  Goal: Patient/Family Long Term Goal  Description: Patient's Long Term Goal: free of pain    Interventions:  - monitor pain score  - See additional Care Plan goals for specific interventions  Outcome: Progressing  Goal: Patient/Family Short Term Goal  Description: Patient's Short Term Goal: free of falls     Interventions:   - fall precautions in place  - See additional Care Plan goals for specific interventions  Outcome: Progressing     Problem: Delirium  Goal: Minimize duration of delirium  Description: Interventions:  - Encourage use of hearing aids, eye glasses  - Promote highest level of mobility daily  - Provide frequent reorientation  - Promote wakefulness i.e. lights on, blinds open  - Promote sleep, encourage patient's normal rest cycle i.e. lights off, TV off, minimize noise and interruptions  - Encourage family to assist in orientation and promotion of home routines  Outcome: Progressing     Problem: RESPIRATORY - ADULT  Goal: Achieves optimal ventilation and oxygenation  Description: INTERVENTIONS:  - Assess for changes in respiratory status  - Assess for changes in mentation and behavior  - Position to facilitate oxygenation and minimize respiratory effort  - Oxygen supplementation based on oxygen saturation or ABGs  - Provide Smoking Cessation handout, if applicable  - Encourage broncho-pulmonary hygiene including cough, deep breathe, Incentive Spirometry  - Assess the need for suctioning and perform as needed  - Assess and instruct to report SOB or any respiratory difficulty  - Respiratory Therapy support as indicated  - Manage/alleviate anxiety  - Monitor  for signs/symptoms of CO2 retention  Outcome: Progressing     Problem: GENITOURINARY - ADULT  Goal: Absence of urinary retention  Description: INTERVENTIONS:  - Assess patient’s ability to void and empty bladder  - Monitor intake/output and perform bladder scan as needed  - Follow urinary retention protocol/standard of care  - Consider collaborating with pharmacy to review patient's medication profile  - Implement strategies to promote bladder emptying  Outcome: Progressing     Problem: METABOLIC/FLUID AND ELECTROLYTES - ADULT  Goal: Glucose maintained within prescribed range  Description: INTERVENTIONS:  - Monitor Blood Glucose as ordered  - Assess for signs and symptoms of hyperglycemia and hypoglycemia  - Administer ordered medications to maintain glucose within target range  - Assess barriers to adequate nutritional intake and initiate nutrition consult as needed  - Instruct patient on self management of diabetes  Outcome: Progressing  Goal: Electrolytes maintained within normal limits  Description: INTERVENTIONS:  - Monitor labs and rhythm and assess patient for signs and symptoms of electrolyte imbalances  - Administer electrolyte replacement as ordered  - Monitor response to electrolyte replacements, including rhythm and repeat lab results as appropriate  - Fluid restriction as ordered  - Instruct patient on fluid and nutrition restrictions as appropriate  Outcome: Progressing  Goal: Hemodynamic stability and optimal renal function maintained  Description: INTERVENTIONS:  - Monitor labs and assess for signs and symptoms of volume excess or deficit  - Monitor intake, output and patient weight  - Monitor urine specific gravity, serum osmolarity and serum sodium as indicated or ordered  - Monitor response to interventions for patient's volume status, including labs, urine output, blood pressure (other measures as available)  - Encourage oral intake as appropriate  - Instruct patient on fluid and nutrition  restrictions as appropriate  Outcome: Progressing     Problem: SKIN/TISSUE INTEGRITY - ADULT  Goal: Skin integrity remains intact  Description: INTERVENTIONS  - Assess and document risk factors for pressure ulcer development  - Assess and document skin integrity  - Monitor for areas of redness and/or skin breakdown  - Initiate interventions, skin care algorithm/standards of care as needed  Outcome: Progressing     Problem: MUSCULOSKELETAL - ADULT  Goal: Return mobility to safest level of function  Description: INTERVENTIONS:  - Assess patient stability and activity tolerance for standing, transferring and ambulating w/ or w/o assistive devices  - Assist with transfers and ambulation using safe patient handling equipment as needed  - Ensure adequate protection for wounds/incisions during mobilization  - Obtain PT/OT consults as needed  - Advance activity as appropriate  - Communicate ordered activity level and limitations with patient/family  Outcome: Progressing     Problem: NEUROLOGICAL - ADULT  Goal: Achieves stable or improved neurological status  Description: INTERVENTIONS  - Assess for and report changes in neurological status  - Initiate measures to prevent increased intracranial pressure  - Maintain blood pressure and fluid volume within ordered parameters to optimize cerebral perfusion and minimize risk of hemorrhage  - Monitor temperature, glucose, and sodium. Initiate appropriate interventions as ordered  Outcome: Progressing

## 2025-06-01 NOTE — SLP NOTE
SPEECH DAILY NOTE - INPATIENT    ASSESSMENT & PLAN   ASSESSMENT  PPE REQUIRED. THIS THERAPIST WORE GLOVES FOR DURATION OF EVALUATION. HANDS WASHED UPON ENTRANCE/EXIT.    SLP f/u for ongoing dysphagia assessment per recommendations of BSE. RN reports that patient has been eager to eat/drink.    Pt positioned upright in bed. Pt afebrile, tolerating room air with oxygen status at 98 prior to the start of oral trials. Oral care was provided however, pt was often resistant. Pt maintained his eyes mostly closed, was irritable, and slow to respond. He repeatedly asked for food. Completed trials of thin liquids, nectar thick liquids, and honey thick liquids via spoon. Cueing was provided for patient to attend to spoon and remove trial. Anterior spillage of liquids was noted. There was adequate oral clearance. Suspect a delayed swallow response and occasional multiple swallows were produced. There was immediate and delayed coughing with thin liquids. Delayed coughing produced with thickened liquids. A wet, gurgly vocal quality was noted across all trials provided. Further trials were discontinued given increased risk for aspiration.    Given continued altered mental status and signs of aspiration across all trials provided, recommend continue NPO at this time. Speech therapy to follow up to continue to assess swallow function.     RN was alerted with results and recommendations.     MOST RECENT CXR 5/31   New left basilar opacities, pneumonia versus aspiration.   Persistent mild pulmonary edema.      Diet Recommendations - Solids: NPO  Diet Recommendations - Liquids: NPO    Compensatory Strategies Recommended:  (N/A)  Aspiration Precautions:  (N/A)  Medication Administration Recommendations: Non-oral    Patient Experiencing Pain: No                Treatment Plan  Treatment Plan/Recommendations: SLP to reassess    Interdisciplinary Communication: Discussed with RN            GOALS  Goal #1 Patient will participate in ongoing  clinical bedside swallow evaluation as appropriate.   In Progress       FOLLOW UP  Follow Up Needed (Documentation Required): Yes  SLP Follow-up Date: 06/02/25  Duration: 1 week    Session: 5 after initial evaluation    If you have any questions, please contact Grisel Castillo, SLP Grisel Castillo, [facility]  [phone]

## 2025-06-01 NOTE — PROGRESS NOTES
[facility]  part of [facility]    Cardiology Progress Note    Aldo Valdez Patient Status:  Inpatient    1945 MRN [id number]   Location [facility] 2W/SW Attending Pedro Arrieta DO   Hosp Day # 10 PCP Wyatt Ross DO       Impression/Plan:  79 year old male presenting with:     1. witnessed bradycardic CV arrest (25), +throat swelling- anaphylactic reaction? (unknown culprit medication)--> steroids/benadryl, holding lisinopril     2. pAF on apixaban--> GIB, Hg 6.4 s/p prbc transfusion--> AC held, EGD/C-scope 25: diverticulosis (likely cause), polyp s/p cautery/snare.    3. Aspiration PNA?    4.  UTI     - TTE this admission with normal LV function, no significant valve disease  - Cont diltiazem gtt for rate control; resume po agents when patient able to take po  - Eliquis held 2/2 NPO status; if prolonged hold anticipated consider heparin gtt for stroke prevention  - IV lasix PRN; likely hold today 2/2 rise in creatinine  - Monitor on tele  - Will follow     Subjective: Patient febrile overnight (100.7), UA suggestive of UTI.  Today resting comfortably without acute complaints.    Problem List[1]    Objective:   Temp: 98 °F (36.7 °C)  Pulse: 106  Resp: 27  BP: 138/76    Intake/Output:     Intake/Output Summary (Last 24 hours) at 2025 0829  Last data filed at 2025 0424  Gross per 24 hour   Intake 1155.91 ml   Output 1950 ml   Net -794.09 ml       Last 3 Weights   25 0518 152 lb 12.5 oz (69.3 kg)   25 0500 154 lb 5.2 oz (70 kg)   25 0400 156 lb 8.4 oz (71 kg)   25 0200 151 lb 14.4 oz (68.9 kg)   25 2238 157 lb 6.5 oz (71.4 kg)   25 1426 180 lb (81.6 kg)   10/08/24 1853 180 lb (81.6 kg)   19 0857 188 lb (85.3 kg)       Tele: AF, episodic RVR    Physical Exam:    General: Resting comfortably  HEENT: Normocephalic, anicteric sclera, neck supple, no thyromegaly or adenopathy.  Neck: No JVD, carotids 2+, no bruits.  Cardiac:  Irregularly irregular  Lungs: Clear without wheezes, rales, rhonchi or dullness.  Normal excursions and effort.  Abdomen: Soft, non-tender. No organosplenomegally, mass or rebound, BS-present.  Extremities: Without clubbing, cyanosis or edema.  Peripheral pulses are 2+.  Neurologic: Resting comfortably  Skin: Warm and dry.     Laboratory/Data:    Labs:         Recent Labs   Lab 05/26/25  0439 05/27/25  0831 05/29/25 0425 05/30/25 0412 05/31/25  0449 05/31/25  0646 06/01/25  0130   WBC 4.9 6.8  --   --   --  15.5* 13.8*   HGB 7.0* 7.5* 7.5* 8.6* 8.5* 8.5* 7.6*   MCV 83.6 82.0  --   --   --  83.5 84.9   .0 286.0  --   --   --  344.0 393.0       Recent Labs   Lab 05/27/25  1543 05/28/25 0405 05/29/25 0425 05/30/25 0412 05/31/25 0449 06/01/25  0130 06/01/25  0518    145 142 144 143  --   --    K 3.6 3.6 3.2* 3.7  3.7 3.3* 3.4*  --    * 114* 111 111 108  --   --    CO2 21.0 23.0 23.0 25.0 25.0  --   --    BUN 44* 34* 27* 26* 26*  --   --    CREATSERUM 1.66* 1.42* 1.26 1.23 1.30 1.52*  --    CA 8.0* 8.1* 8.0* 7.9* 8.1*  --   --    MG  --   --  1.8 2.1  --   --  2.0   PHOS  --   --   --   --   --   --  2.8   * 116* 125* 130* 109*  --   --        Recent Labs   Lab 05/26/25  0439 05/28/25 0405 05/29/25 0425 05/30/25  0412   ALT 19 28 43 46   AST 27 24 42* 39*   ALB 3.2 3.5 3.3 3.4       No results for input(s): \"TROP\" in the last 168 hours.    Allergies:   Allergies[2]    Medications:  Current Hospital Medications[3]    Kaiser Byrne MD  6/1/2025  8:29 AM         [1]   Patient Active Problem List  Diagnosis    Essential hypertension    Back pain    Pure hypercholesterolemia    Cerebral infarction involving posterior cerebral artery, right (HCC)    Anxiety    Acute gastrointestinal hemorrhage    Tremor    Mixed Alzheimer and vascular dementia (HCC)    Generalized anxiety disorder    Alcohol abuse, continuous    Episodic mood disorder    Delirium due to another medical condition   [2]    Allergies  Allergen Reactions    Lisinopril ANGIOEDEMA     Per Dr. Fonseca, continue to hold Lisinopril as Pt is an unreliable historian & Pt experiences cardiopulmonary arrest in which angioedema from Lisinopril is suspected;   [3]   Current Facility-Administered Medications   Medication Dose Route Frequency    cefTRIAXone (Rocephin) 2 g in sodium chloride 0.9% 100 mL IVPB-ADDV  2 g Intravenous Q24H    LORazepam (Ativan) 2 mg/mL injection 0.5 mg  0.5 mg Intravenous Q6H PRN    [Held by provider] dilTIAZem (cardIZEM) tab 90 mg  90 mg Oral 4 times per day    thiamine (Vitamin B1) tab 100 mg  100 mg Oral Daily    diazePAM (Valium) tab 2 mg  2 mg Oral BID    mirtazapine (Remeron) tab 7.5 mg  7.5 mg Oral Nightly    apixaban (Eliquis) tab 5 mg  5 mg Oral BID    ergocalciferol (Vitamin D2) cap 50,000 Units  50,000 Units Oral Weekly    ipratropium-albuterol (Duoneb) 0.5-2.5 (3) MG/3ML inhalation solution 3 mL  3 mL Nebulization Q6H PRN    insulin aspart (NovoLOG) 100 Units/mL FlexPen 1-5 Units  1-5 Units Subcutaneous TID CC    LORazepam (Ativan) 2 mg/mL injection 0.5 mg  0.5 mg Intravenous Daily PRN    dilTIAZem 10 mg BOLUS FROM BAG infusion  10 mg Intravenous Q1H PRN    dilTIAZem (cardIZEM) 100 mg in sodium chloride 0.9% 100 mL IVPB-ADDV  2.5-20 mg/hr Intravenous Continuous    pantoprazole (Protonix) 40 mg in sodium chloride 0.9% PF 10 mL IV push  40 mg Intravenous Daily    [Held by provider] metoprolol tartrate (Lopressor) tab 25 mg  25 mg Oral 2x Daily(Beta Blocker)    acetaminophen (Tylenol) tab 650 mg  650 mg Oral Q4H PRN    ondansetron (Zofran) 4 MG/2ML injection 4 mg  4 mg Intravenous Q6H PRN    metoclopramide (Reglan) 5 mg/mL injection 5 mg  5 mg Intravenous Q8H PRN    glucose (Dex4) 15 GM/59ML oral liquid 15 g  15 g Oral Q15 Min PRN    Or    glucose (Glutose) 40% oral gel 15 g  15 g Oral Q15 Min PRN    Or    glucose-vitamin C (Dex-4) chewable tab 4 tablet  4 tablet Oral Q15 Min PRN    Or    dextrose 50% injection  50 mL  50 mL Intravenous Q15 Min PRN    Or    glucose (Dex4) 15 GM/59ML oral liquid 30 g  30 g Oral Q15 Min PRN    Or    glucose (Glutose) 40% oral gel 30 g  30 g Oral Q15 Min PRN    Or    glucose-vitamin C (Dex-4) chewable tab 8 tablet  8 tablet Oral Q15 Min PRN      144

## 2025-06-01 NOTE — PLAN OF CARE
Patient transferring to room 314. All belongings are with patient. Left VM with patient's POA/guardian Juanito to update him on room change. Report given to Susannah CARNEY.

## 2025-06-01 NOTE — PLAN OF CARE
Problem: Diabetes/Glucose Control  Goal: Glucose maintained within prescribed range  Description: INTERVENTIONS:  - Monitor Blood Glucose as ordered  - Assess for signs and symptoms of hyperglycemia and hypoglycemia  - Administer ordered medications to maintain glucose within target range  - Assess barriers to adequate nutritional intake and initiate nutrition consult as needed  - Instruct patient on self management of diabetes  Outcome: Progressing     Problem: GASTROINTESTINAL - ADULT  Goal: Maintains or returns to baseline bowel function  Description: INTERVENTIONS:  - Assess bowel function  - Maintain adequate hydration with IV or PO as ordered and tolerated  - Evaluate effectiveness of GI medications  - Encourage mobilization and activity  - Obtain nutritional consult as needed  - Establish a toileting routine/schedule  - Consider collaborating with pharmacy to review patient's medication profile  Outcome: Progressing     Problem: HEMATOLOGIC - ADULT  Goal: Maintains hematologic stability  Description: INTERVENTIONS  - Assess for signs and symptoms of bleeding or hemorrhage  - Monitor labs and vital signs for trends  - Administer supportive blood products/factors, fluids and medications as ordered and appropriate  - Administer supportive blood products/factors as ordered and appropriate  Outcome: Progressing  Goal: Free from bleeding injury  Description: (Example usage: patient with low platelets)  INTERVENTIONS:  - Avoid intramuscular injections, enemas and rectal medication administration  - Ensure safe mobilization of patient  - Hold pressure on venipuncture sites to achieve adequate hemostasis  - Assess for signs and symptoms of internal bleeding  - Monitor lab trends  - Patient is to report abnormal signs of bleeding to staff  - Avoid use of toothpicks and dental floss  - Use electric shaver for shaving  - Use soft bristle tooth brush  - Limit straining and forceful nose blowing  Outcome: Progressing      Problem: RESPIRATORY - ADULT  Goal: Achieves optimal ventilation and oxygenation  Description: INTERVENTIONS:  - Assess for changes in respiratory status  - Assess for changes in mentation and behavior  - Position to facilitate oxygenation and minimize respiratory effort  - Oxygen supplementation based on oxygen saturation or ABGs  - Provide Smoking Cessation handout, if applicable  - Encourage broncho-pulmonary hygiene including cough, deep breathe, Incentive Spirometry  - Assess the need for suctioning and perform as needed  - Assess and instruct to report SOB or any respiratory difficulty  - Respiratory Therapy support as indicated  - Manage/alleviate anxiety  - Monitor for signs/symptoms of CO2 retention  Outcome: Progressing     Problem: GENITOURINARY - ADULT  Goal: Absence of urinary retention  Description: INTERVENTIONS:  - Assess patient’s ability to void and empty bladder  - Monitor intake/output and perform bladder scan as needed  - Follow urinary retention protocol/standard of care  - Consider collaborating with pharmacy to review patient's medication profile  - Implement strategies to promote bladder emptying  Outcome: Progressing     Problem: METABOLIC/FLUID AND ELECTROLYTES - ADULT  Goal: Glucose maintained within prescribed range  Description: INTERVENTIONS:  - Monitor Blood Glucose as ordered  - Assess for signs and symptoms of hyperglycemia and hypoglycemia  - Administer ordered medications to maintain glucose within target range  - Assess barriers to adequate nutritional intake and initiate nutrition consult as needed  - Instruct patient on self management of diabetes  Outcome: Progressing     Problem: SKIN/TISSUE INTEGRITY - ADULT  Goal: Skin integrity remains intact  Description: INTERVENTIONS  - Assess and document risk factors for pressure ulcer development  - Assess and document skin integrity  - Monitor for areas of redness and/or skin breakdown  - Initiate interventions, skin care  algorithm/standards of care as needed  Outcome: Progressing     Problem: MUSCULOSKELETAL - ADULT  Goal: Return mobility to safest level of function  Description: INTERVENTIONS:  - Assess patient stability and activity tolerance for standing, transferring and ambulating w/ or w/o assistive devices  - Assist with transfers and ambulation using safe patient handling equipment as needed  - Ensure adequate protection for wounds/incisions during mobilization  - Obtain PT/OT consults as needed  - Advance activity as appropriate  - Communicate ordered activity level and limitations with patient/family  Outcome: Progressing

## 2025-06-01 NOTE — PROGRESS NOTES
Antimicrobial Dose Adjustment for Ceftriaxone    Indication:  Moderate dose (pyelonephritis, pneumonia, intra-abdominal infection, bacteremia, bone/joint infection)  Anthropometrics:  Wt Readings from Last 1 Encounters:   05/31/25 69.3 kg (152 lb 12.5 oz)         Ceftriaxone adjusted per P&T protocol.

## 2025-06-01 NOTE — PROGRESS NOTES
[facility] Hospitalist Progress Note     CC: Hospital Follow up    PCP: Wyatt Ross DO       Assessment/Plan:     Principal Problem:    Acute gastrointestinal hemorrhage  Active Problems:    Tremor    Mixed Alzheimer and vascular dementia (HCC)    Generalized anxiety disorder    Alcohol abuse, continuous    Episodic mood disorder    Delirium due to another medical condition    79-year-old male with history of Dementia, CVA, Atrial Fibrillation on Eliquis, Hypertension, type 2 Diabetes, CKD, who presents to the hospital for evaluation of rectal bleeding.  S/p EGD/C-scope 5/23/25.  Hospital course complicated by CODE BLUE was called for increased secretions with tongue swelling and bradycardia which led to asystolic arrest.  CPR was initiated and patient was intubated.  Likely cause was thought to be due to angioedema from lisinopril.  Extubated 5/25/25.      Worsening congestion   - Presumed aspiration pneumonia  -Lung parenchyma more clear today patient is more awake less congested less hypoxic  -Awaiting repeat bedside swallow eval okay for diet if passes  -Possible urinary tract infection based on UA antibiotics changed to ceftriaxone alone  -Wean O2 as able    Acute respiratory failure secondary to angioedema resolved   -Thought to be related to ACE inhibitor which is a home medication, I do not see that any was given while in the hospital  -Tongue swelling and secretions are now resolved  Extubated as of 5/25 and currently on room air  -Completed steroids and Benadryl    Tremors , thought to be related to benzodiazepine withdrawal take scheduled Valium at home  -Unclear why he does drink alcohol frequently as well    Rectal bleeding   Acute blood loss anemia  -Hemoglobin now stable  - S/p EGD/C-scope 5/23/25 Colonoscopy with diverticulosis and large pedunculated polyp in the rectosigmoid region that was fully removed with hot cautery snare, bleeding was felt to be more likely diverticular without  culprit lesion identified   - PPI IV   - if further bleeding may need IR involvement   - serial Hemoglobin monitoring has been stable   - Eliquis resumed 5/29/25 -hemoglobin has been improving     Atrial fibrillation chronic  -As patient had been n.p.o. dill drip was restarted at 10 if able to take oral can resume oral medications and stop dill drip today  -Chronically takes diltiazem 180 mg twice daily at home and Toprol 50 XL daily  -Inpatient regiment was 90 mg every 6  - Previous oral dose metoprolol tartrate 25 twice daily  -Also on diltiazem drip at 5  Cardiology following  - Echo with EF 55-60%  - resume Eliquis 5/29/25   - iv lasix as needed- ordered 1 time dose 5/31  - Lung congestion is improved    DM  - A1c 5.9, ISS used while on steroids      ZOE on CKD  -Resolved  Hypernatremia revolved      Dementia-chronic could be Alzheimer's type versus alcohol induced  - resides at the [facility] [place]-independent per report  - TSH, b12 wnl   - was in the process of moving [facility] in [place] prior to admission   Plan to go to subacute rehab then to memory care in [place]  - Guardian Juanito requesting NANCY before this transition   PT OT eval ongoing     Fluids: none  Diet: modified   DVT prophylaxis: Eliquis      Dispo: cardiac tele, once medically stable can go to subacute rehab     Code status: Full code    Questions/concerns were discussed with patient and/or family by bedside.      Thank You,  Pedro Arrieta, DO    Hospitalist with [facility]     Subjective:     Again asking for something to eat or drink feels like were not giving him anything denies fevers or chills denies cough    OBJECTIVE:    Blood pressure 137/76, pulse 103, temperature 99.2 °F (37.3 °C), temperature source Axillary, resp. rate 24, height 5' 9\" (1.753 m), weight 152 lb 12.5 oz (69.3 kg), SpO2 98%.    Temp:  [98 °F (36.7 °C)-100.7 °F (38.2 °C)] 99.2 °F (37.3 °C)  Pulse:  [103-125] 103  Resp:  [9-36]  24  BP: (129-159)/(70-89) 137/76  SpO2:  [94 %-100 %] 98 %      Intake/Output:    Intake/Output Summary (Last 24 hours) at 6/1/2025 0919  Last data filed at 6/1/2025 0424  Gross per 24 hour   Intake 1155.91 ml   Output 1600 ml   Net -444.09 ml       Last 3 Weights   05/31/25 0518 152 lb 12.5 oz (69.3 kg)   05/28/25 0500 154 lb 5.2 oz (70 kg)   05/27/25 0400 156 lb 8.4 oz (71 kg)   05/25/25 0200 151 lb 14.4 oz (68.9 kg)   05/22/25 2238 157 lb 6.5 oz (71.4 kg)   05/22/25 1426 180 lb (81.6 kg)   10/08/24 1853 180 lb (81.6 kg)   12/18/19 0857 188 lb (85.3 kg)       Exam   Gen: No acute distress, alert and oriented x 1-2  Pulm: Overall improved air entry no wheezes  CV: Heart with regular rate and rhythm  Abd: Abdomen soft, non-tender  MSK: no clubbing, no cyanosis        Data Review:       Labs:     Recent Labs   Lab 05/26/25  0439 05/27/25  0831 05/29/25  0425 05/31/25  0449 05/31/25  0646 06/01/25  0130   RBC 2.68* 2.89*  --   --  3.34* 3.05*   HGB 7.0* 7.5*   < > 8.5* 8.5* 7.6*   HCT 22.4* 23.7*   < > 27.4* 27.9* 25.9*   MCV 83.6 82.0  --   --  83.5 84.9   MCH 26.1 26.0  --   --  25.4* 24.9*   MCHC 31.3 31.6  --   --  30.5* 29.3*   RDW 18.0* 17.8*  --   --  16.8* 17.2*   NEPRELIM 3.38 5.20  --   --  13.37*  --    WBC 4.9 6.8  --   --  15.5* 13.8*   .0 286.0  --   --  344.0 393.0    < > = values in this interval not displayed.         Recent Labs   Lab 05/29/25 0425 05/30/25 0412 05/31/25 0449 06/01/25  0130   * 130* 109*  --    BUN 27* 26* 26*  --    CREATSERUM 1.26 1.23 1.30 1.52*   EGFRCR 59* 60 57* 47*   CA 8.0* 7.9* 8.1*  --     144 143  --    K 3.2* 3.7  3.7 3.3* 3.4*    111 108  --    CO2 23.0 25.0 25.0  --        Recent Labs   Lab 05/26/25  0439 05/28/25 0405 05/29/25 0425 05/30/25 0412   ALT 19 28 43 46   AST 27 24 42* 39*   ALB 3.2 3.5 3.3 3.4         Imaging:  XR CHEST AP PORTABLE  (CPT=71045)  Result Date: 5/31/2025  CONCLUSION:   New left basilar opacities, pneumonia  versus aspiration.  Persistent mild pulmonary edema.    elm-[email or web address]      Dictated by (CST): Jose Roberto Lebron MD on 5/31/2025 at 8:21 AM     Finalized by (CST): Jose Roberto Lebron MD on 5/31/2025 at 8:23 AM              Meds:     Scheduled Medications[1]  Medication Infusions[2]  PRN Medications[3]           [1]    cefTRIAXone  2 g Intravenous Q24H    [Held by provider] dilTIAZem  90 mg Oral 4 times per day    thiamine  100 mg Oral Daily    diazePAM  2 mg Oral BID    mirtazapine  7.5 mg Oral Nightly    apixaban  5 mg Oral BID    ergocalciferol  50,000 Units Oral Weekly    insulin aspart  1-5 Units Subcutaneous TID CC    pantoprazole  40 mg Intravenous Daily    [Held by provider] metoprolol tartrate  25 mg Oral 2x Daily(Beta Blocker)   [2]    dilTIAZem 10 mg/hr (06/01/25 0520)   [3]   LORazepam    ipratropium-albuterol    LORazepam    dilTIAZem    acetaminophen    ondansetron    metoclopramide    glucose **OR** glucose **OR** glucose-vitamin C **OR** dextrose **OR** glucose **OR** glucose **OR** glucose-vitamin C

## 2025-06-02 NOTE — CM/SW NOTE
FAITH contacted pt's Guardian Juanito via phone. He confirmed receipt of NANCY list on Friday 5/30 but has not reviewed it.    FAITH requested he review the list and call FAITH w/ choice. Provided contact info on his Vmail per his request as he was driving.    PLAN: NANCY - pending choice & med clear      SW/CARLITA to remain available for support and/or discharge planning.       MS SeraW, LSW [id number]

## 2025-06-02 NOTE — PROGRESS NOTES
[facility]  part of [facility]    Cardiology Progress Note    Aldo Valdez Patient Status:  Inpatient    1945 MRN [id number]   Location [facility] 2W/SW Attending Pedro Arrieta DO   Hosp Day # 11 PCP Wyatt Ross DO       Impression/Plan:  79 year old male presenting with:     1. witnessed bradycardic CV arrest (25), +throat swelling- anaphylactic reaction? (unknown culprit medication)--> steroids/benadryl, holding lisinopril     2. pAF on apixaban--> GIB, Hg 6.4 s/p prbc transfusion--> AC held, EGD/C-scope 25: diverticulosis (likely cause), polyp s/p cautery/snare.    3. Aspiration PNA?    4.  UTI     - TTE this admission with normal LV function, no significant valve disease  - Cont diltiazem gtt for rate control; resume po agents when patient able to take po  - Eliquis held 2/2 NPO status; currently on heparin drip  - IV lasix PRN  - Monitor on tele  - Will follow         Subjective:   Confused.     Problem List[1]    Objective:   Temp: 98.6 °F (37 °C)  Pulse: 118  Resp: 24  BP: 154/86    Intake/Output:     Intake/Output Summary (Last 24 hours) at 2025 0728  Last data filed at 2025 0600  Gross per 24 hour   Intake 273 ml   Output 750 ml   Net -477 ml       Last 3 Weights   25 0518 152 lb 12.5 oz (69.3 kg)   25 0500 154 lb 5.2 oz (70 kg)   25 0400 156 lb 8.4 oz (71 kg)   25 0200 151 lb 14.4 oz (68.9 kg)   25 2238 157 lb 6.5 oz (71.4 kg)   25 1426 180 lb (81.6 kg)   10/08/24 1853 180 lb (81.6 kg)   19 0857 188 lb (85.3 kg)       Tele: AF, episodic RVR    Physical Exam:    General: Resting comfortably  HEENT: Normocephalic, anicteric sclera  Cardiac: Irregularly irregular  Lungs: Clear without wheezes, rales, rhonchi or dullness.  Normal excursions and effort.  Abdomen: Soft, non-distended  Extremities: no edema  Neurologic: Resting comfortably  Skin: Warm and dry.     Laboratory/Data:    Labs:         Recent Labs   Lab  05/27/25  0831 05/29/25 0425 05/31/25 0449 05/31/25  0646 06/01/25  0130 06/01/25  1041 06/02/25  0600   WBC 6.8  --   --  15.5* 13.8* 13.6* 13.7*   HGB 7.5*   < > 8.5* 8.5* 7.6* 8.2* 7.6*  7.7*   MCV 82.0  --   --  83.5 84.9 83.7 83.1   .0  --   --  344.0 393.0 463.0* 520.0*    < > = values in this interval not displayed.       Recent Labs   Lab 05/28/25 0405 05/29/25 0425 05/30/25 0412 05/31/25 0449 06/01/25 0130 06/01/25  0518 06/01/25  1041 06/02/25  0600    142 144 143  --   --  147*  --    K 3.6 3.2* 3.7  3.7 3.3* 3.4*  --  3.6 3.6   * 111 111 108  --   --  114*  --    CO2 23.0 23.0 25.0 25.0  --   --  23.0  --    BUN 34* 27* 26* 26*  --   --  26*  --    CREATSERUM 1.42* 1.26 1.23 1.30 1.52*  --  1.48*  --    CA 8.1* 8.0* 7.9* 8.1*  --   --  8.1*  --    MG  --  1.8 2.1  --   --  2.0  --   --    PHOS  --   --   --   --   --  2.8  --   --    * 125* 130* 109*  --   --  109*  --        Recent Labs   Lab 05/28/25 0405 05/29/25 0425 05/30/25 0412   ALT 28 43 46   AST 24 42* 39*   ALB 3.5 3.3 3.4       No results for input(s): \"TROP\" in the last 168 hours.      ECHO:  1. Left ventricle: The cavity size was normal. Wall thickness was normal.      Systolic function was normal. The estimated ejection fraction was 55-60%,      by biplane method of disks. Wall motion is normal; there are no regional      wall motion abnormalities. Unable to assess LV diastolic function due to      heart rhythm.   2. Right ventricle: The cavity size was normal. Systolic function was      normal.   3. Left atrium: The atrial volume was mildly increased.   4. Aortic valve: There was thickening, consistent with sclerosis.   Impressions:  No previous study was available for comparison.            Allergies:   Allergies[2]    Medications:  Current Hospital Medications[3]           [1]   Patient Active Problem List  Diagnosis    Essential hypertension    Back pain    Pure hypercholesterolemia    Cerebral  infarction involving posterior cerebral artery, right (HCC)    Anxiety    Acute gastrointestinal hemorrhage    Tremor    Mixed Alzheimer and vascular dementia (HCC)    Generalized anxiety disorder    Alcohol abuse, continuous    Episodic mood disorder    Delirium due to another medical condition   [2]   Allergies  Allergen Reactions    Lisinopril ANGIOEDEMA     Per Dr. Fonseca, continue to hold Lisinopril as Pt is an unreliable historian & Pt experiences cardiopulmonary arrest in which angioedema from Lisinopril is suspected;   [3]   Current Facility-Administered Medications   Medication Dose Route Frequency    cefTRIAXone (Rocephin) 2 g in sodium chloride 0.9% 100 mL IVPB-ADDV  2 g Intravenous Q24H    sodium chloride 0.9% infusion   Intravenous Continuous    heparin (Porcine) 82380 units/250mL infusion ACS/AFIB CONTINUOUS  200-3,000 Units/hr Intravenous Continuous    LORazepam (Ativan) 2 mg/mL injection 0.5 mg  0.5 mg Intravenous Q6H PRN    [Held by provider] dilTIAZem (cardIZEM) tab 90 mg  90 mg Oral 4 times per day    thiamine (Vitamin B1) tab 100 mg  100 mg Oral Daily    diazePAM (Valium) tab 2 mg  2 mg Oral BID    mirtazapine (Remeron) tab 7.5 mg  7.5 mg Oral Nightly    ergocalciferol (Vitamin D2) cap 50,000 Units  50,000 Units Oral Weekly    ipratropium-albuterol (Duoneb) 0.5-2.5 (3) MG/3ML inhalation solution 3 mL  3 mL Nebulization Q6H PRN    insulin aspart (NovoLOG) 100 Units/mL FlexPen 1-5 Units  1-5 Units Subcutaneous TID CC    LORazepam (Ativan) 2 mg/mL injection 0.5 mg  0.5 mg Intravenous Daily PRN    dilTIAZem 10 mg BOLUS FROM BAG infusion  10 mg Intravenous Q1H PRN    dilTIAZem (cardIZEM) 100 mg in sodium chloride 0.9% 100 mL IVPB-ADDV  2.5-20 mg/hr Intravenous Continuous    pantoprazole (Protonix) 40 mg in sodium chloride 0.9% PF 10 mL IV push  40 mg Intravenous Daily    [Held by provider] metoprolol tartrate (Lopressor) tab 25 mg  25 mg Oral 2x Daily(Beta Blocker)    acetaminophen (Tylenol) tab 650 mg   650 mg Oral Q4H PRN    ondansetron (Zofran) 4 MG/2ML injection 4 mg  4 mg Intravenous Q6H PRN    metoclopramide (Reglan) 5 mg/mL injection 5 mg  5 mg Intravenous Q8H PRN    glucose (Dex4) 15 GM/59ML oral liquid 15 g  15 g Oral Q15 Min PRN    Or    glucose (Glutose) 40% oral gel 15 g  15 g Oral Q15 Min PRN    Or    glucose-vitamin C (Dex-4) chewable tab 4 tablet  4 tablet Oral Q15 Min PRN    Or    dextrose 50% injection 50 mL  50 mL Intravenous Q15 Min PRN    Or    glucose (Dex4) 15 GM/59ML oral liquid 30 g  30 g Oral Q15 Min PRN    Or    glucose (Glutose) 40% oral gel 30 g  30 g Oral Q15 Min PRN    Or    glucose-vitamin C (Dex-4) chewable tab 8 tablet  8 tablet Oral Q15 Min PRN

## 2025-06-02 NOTE — PROGRESS NOTES
[facility] Hospitalist Progress Note     CC: Hospital Follow up    PCP: Wyatt Ross DO       Assessment/Plan:     Principal Problem:    Acute gastrointestinal hemorrhage  Active Problems:    Tremor    Mixed Alzheimer and vascular dementia (HCC)    Generalized anxiety disorder    Alcohol abuse, continuous    Episodic mood disorder    Delirium due to another medical condition    79-year-old male with history of Dementia, CVA, Atrial Fibrillation on Eliquis, Hypertension, type 2 Diabetes, CKD, who presents to the hospital for evaluation of rectal bleeding.  S/p EGD/C-scope 5/23/25.  Hospital course complicated by CODE BLUE was called for increased secretions with tongue swelling and bradycardia which led to asystolic arrest.  CPR was initiated and patient was intubated.  Likely cause was thought to be due to angioedema from lisinopril.  Extubated 5/25/25.     - Presumed aspiration pneumonia over weekend   -Congestion improved  -Unasyn changed to ceftriaxone over concerns of possible urinary tract infection  -As patient clinically has improved on ceftriaxone will continue  Lungs are now clear patient alert and oriented x 2-3 today  Bedside swallow eval pending  -Now on room air    Acute respiratory failure secondary to angioedema resolved   -Thought to be related to ACE inhibitor which is a home medication, I do not see that any was given while in the hospital  -Tongue swelling and secretions are now resolved  Extubated as of 5/25 and currently on room air  -Completed steroids and Benadryl    Tremors , thought to be related to benzodiazepine withdrawal take scheduled Valium at home  -Unclear why he does drink alcohol frequently as well    Rectal bleeding   Acute blood loss anemia  -Hemoglobin now stable  - S/p EGD/C-scope 5/23/25 Colonoscopy with diverticulosis and large pedunculated polyp in the rectosigmoid region that was fully removed with hot cautery snare, bleeding was felt to be more likely  diverticular without culprit lesion identified   - PPI IV   - if further bleeding may need IR involvement   - serial Hemoglobin monitoring has been stable   - Eliquis resumed 5/29/25 -hemoglobin has been improving     Atrial fibrillation chronic  -As patient had been n.p.o. dilt drip was restarted at 10 if able to take oral can resume oral medications and stop dill drip pending speech eval  -Chronically takes diltiazem 180 mg twice daily at home and Toprol 50 XL daily  -Inpatient regiment was 90 mg every 6  - Previous oral dose metoprolol tartrate 25 twice daily  -Also on diltiazem drip at 5  Cardiology following  - Echo with EF 55-60%  - resume Eliquis 5/29/25 -now on heparin drip due to n.p.o. status cardiology following  - iv lasix as needed- ordered 1 time dose 5/31  - Lung congestion is improved  - Has been on gentle fluids due to prolonged n.p.o. continue saline at 30  DM  - A1c 5.9, ISS used while on steroids      ZOE on CKD  -Resolved  Hypernatremia revolved   Full BMP not drawn today       Dementia-chronic could be Alzheimer's type versus alcohol induced  - resides at the [facility] [place]-independent per report  - TSH, b12 wnl   - was in the process of moving [facility] in [place] prior to admission   Plan to go to subacute rehab then to memory care in [place]  - Homer Solomon requesting NANCY before this transition   PT OT eval ongoing     Fluids: none  Diet: modified   DVT prophylaxis: Eliquis      Dispo: cardiac tele, once medically stable can go to subacute rehab     Code status: Full code    Questions/concerns were discussed with patient and/or family by bedside.      Thank You,  Pedro Arrieta DO    Hospitalist with [facility]     Subjective:       Reporting he is thirsty denies cough or shortness of breath no chest pain    OBJECTIVE:    Blood pressure 156/81, pulse 118, temperature 98.2 °F (36.8 °C), temperature source Oral, resp. rate 18, height 5' 9\" (1.753  m), weight 152 lb 12.5 oz (69.3 kg), SpO2 93%.    Temp:  [98.2 °F (36.8 °C)-99.3 °F (37.4 °C)] 98.2 °F (36.8 °C)  Pulse:  [104-126] 118  Resp:  [18-26] 18  BP: (138-158)/(77-93) 156/81  SpO2:  [92 %-100 %] 93 %      Intake/Output:    Intake/Output Summary (Last 24 hours) at 6/2/2025 1214  Last data filed at 6/2/2025 1154  Gross per 24 hour   Intake 283 ml   Output 1150 ml   Net -867 ml       Last 3 Weights   05/31/25 0518 152 lb 12.5 oz (69.3 kg)   05/28/25 0500 154 lb 5.2 oz (70 kg)   05/27/25 0400 156 lb 8.4 oz (71 kg)   05/25/25 0200 151 lb 14.4 oz (68.9 kg)   05/22/25 2238 157 lb 6.5 oz (71.4 kg)   05/22/25 1426 180 lb (81.6 kg)   10/08/24 1853 180 lb (81.6 kg)   12/18/19 0857 188 lb (85.3 kg)       Exam   Gen: No acute distress, alert and oriented x 2  Pulm: Good air entry no wheezes or congestion today  CV: Heart with regular rate and rhythm  Abd: Abdomen soft, non-tender  MSK: no clubbing, no cyanosis        Data Review:       Labs:     Recent Labs   Lab 05/27/25  0831 05/29/25  0425 05/31/25  0646 06/01/25  0130 06/01/25  1041 06/02/25  0600   RBC 2.89*  --  3.34* 3.05* 3.19* 3.08*   HGB 7.5*   < > 8.5* 7.6* 8.2* 7.6*  7.7*   HCT 23.7*   < > 27.9* 25.9* 26.7* 25.2*  25.6*   MCV 82.0  --  83.5 84.9 83.7 83.1   MCH 26.0  --  25.4* 24.9* 25.7* 25.0*   MCHC 31.6  --  30.5* 29.3* 30.7* 30.1*   RDW 17.8*  --  16.8* 17.2* 16.9* 17.2*   NEPRELIM 5.20  --  13.37*  --   --   --    WBC 6.8  --  15.5* 13.8* 13.6* 13.7*   .0  --  344.0 393.0 463.0* 520.0*    < > = values in this interval not displayed.         Recent Labs   Lab 05/30/25  0412 05/31/25  0449 06/01/25  0130 06/01/25  1041 06/02/25  0600   * 109*  --  109*  --    BUN 26* 26*  --  26*  --    CREATSERUM 1.23 1.30 1.52* 1.48*  --    EGFRCR 60 57* 47* 48*  --    CA 7.9* 8.1*  --  8.1*  --     143  --  147*  --    K 3.7  3.7 3.3* 3.4* 3.6 3.6    108  --  114*  --    CO2 25.0 25.0  --  23.0  --        Recent Labs   Lab 05/28/25  1404  05/29/25  0425 05/30/25  0412   ALT 28 43 46   AST 24 42* 39*   ALB 3.5 3.3 3.4         Imaging:  XR CHEST AP PORTABLE  (CPT=71045)  Result Date: 5/31/2025  CONCLUSION:   New left basilar opacities, pneumonia versus aspiration.  Persistent mild pulmonary edema.    elm-[email or web address]      Dictated by (CST): Jose Roberto Lebron MD on 5/31/2025 at 8:21 AM     Finalized by (CST): Jose Roberto Lebron MD on 5/31/2025 at 8:23 AM              Meds:     Scheduled Medications[1]  Medication Infusions[2]  PRN Medications[3]           [1]    cefTRIAXone  2 g Intravenous Q24H    [Held by provider] dilTIAZem  90 mg Oral 4 times per day    thiamine  100 mg Oral Daily    diazePAM  2 mg Oral BID    mirtazapine  7.5 mg Oral Nightly    ergocalciferol  50,000 Units Oral Weekly    insulin aspart  1-5 Units Subcutaneous TID CC    pantoprazole  40 mg Intravenous Daily    [Held by provider] metoprolol tartrate  25 mg Oral 2x Daily(Beta Blocker)   [2]    sodium chloride 30 mL/hr at 06/01/25 1015    continuous dose heparin 900 Units/hr (06/02/25 0708)    dilTIAZem 15 mg/hr (06/02/25 0533)   [3]   LORazepam    ipratropium-albuterol    LORazepam    dilTIAZem    acetaminophen    ondansetron    metoclopramide    glucose **OR** glucose **OR** glucose-vitamin C **OR** dextrose **OR** glucose **OR** glucose **OR** glucose-vitamin C

## 2025-06-02 NOTE — SLP NOTE
ADULT SWALLOWING RE EVALUATION    ASSESSMENT    ASSESSMENT/OVERALL IMPRESSION:  PPE REQUIRED. THIS THERAPIST WORE GLOVES FOR DURATION OF EVALUATION. HANDS WASHED UPON ENTRANCE/EXIT.    A swallow re evaluation warranted as pt remains NPO. Pt afebrile with clear vocal quality, on room air, with oxygen saturation at 92%. Pt with no hx of dysphagia at [facility].   Pt positioned 90 degrees in bed, alert/cooperative. Pt with no complaints of pain. Oral motor examination revealed reduced strength, ROM, and rate of motion. Pt presented with trials of puree and moderately thick liquids via tsp. Pt with reduced oral acceptance and bilabial seal across all trials. Pt with reduce bolus formation/propulsion. Pt's swallow response appears delayed with reduced hyolaryngeal elevation/excursion. Mild wet vocal quality observed. No other clinical signs of aspiration (e.g., immediate/delayed throat clear, immediate/delayed cough, increased O2 effort) observed across trials. 5/31 CXR indicates \"New left basilar opacities, pneumonia versus aspiration. Persistent mild pulmonary edema\". Oxygen status remained stable t/o the entire evaluation.     At this time, pt presents with mild/moderate oral dysphagia and probable pharyngeal dysfunction. Recommend a puree diet and moderately thick liquids with strict adherence to safe swallowing compensatory strategies. Results and recommendations reviewed with RN, pt. Pt v/u to all results/recommendations, no family present. Recommendations remain written on whiteboard. SLP collaborated with RN for MD diet orders.     PLAN: SLP to f/u x3-4 meal assessments, monitor imaging, and VFSS if clinically indicated       RECOMMENDATIONS   Diet Recommendations - Solids: Puree  Diet Recommendations - Liquids: Honey thick liquids/ Moderately thick                    Compensatory Strategies Recommended: Liquids via spoon, Multiple swallows, Alternate consistencies  Aspiration Precautions: Upright position, Slow rate,  Small bites, Small sips, Cueing to swallow, No straw, 1:1 feeding  Medication Administration Recommendations: Crushed in puree  Treatment Plan/Recommendations: Aspiration precautions    HISTORY   MEDICAL HISTORY  Reason for Referral: RN dysphagia screen (Extubated)    Problem List  Principal Problem:    Acute gastrointestinal hemorrhage  Active Problems:    Tremor    Mixed Alzheimer and vascular dementia (HCC)    Generalized anxiety disorder    Alcohol abuse, continuous    Episodic mood disorder    Delirium due to another medical condition      Past Medical History  Past Medical History[1]    Prior Living Situation: Skilled nursing facility  Diet Prior to Admission: Unknown  Precautions: Aspiration    Patient/Family Goals: did not state    SWALLOWING HISTORY  Current Diet Consistency: NPO  Dysphagia History: none  Imaging Results: 5/25 CT BRAIN  CONCLUSION:   1. No acute intracranial finding.   2. 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   3. Moderate changes of chronic small vessel disease in cerebral white matter.   4. Chronic left cerebellar lacunar infarct related to small vessel disease.   5. Large vessel intracranial atherosclerosis.   6. Evaluation degraded by patient related motion artifact.         SUBJECTIVE       OBJECTIVE   ORAL MOTOR EXAMINATION  Dentition: Natural  Symmetry: Within Functional Limits  Strength: Overall reduced     Range of Motion: Overall reduced  Rate of Motion: Reduced    Voice Quality: Hoarse  Respiratory Status: Unlabored  Consistencies Trialed: Honey thick liquids, Puree  Method of Presentation: Staff/Clinician assistance, Spoon  Patient Positioned: Upright, Midline    Oral Phase of Swallow: Impaired  Bolus Retrieval: Impaired  Bilabial Seal: Impaired  Bolus Formation: Impaired  Bolus Propulsion: Impaired  Mastication:  (N/A)  Retention: Impaired    Pharyngeal Phase of Swallow: Appears Impaired  Laryngeal Elevation Timing: Appears impaired  Laryngeal  Elevation Strength: Appears impaired  Laryngeal Elevation Coordination: Appears impaired  (Please note: Silent aspiration cannot be evaluated clinically. Videofluoroscopic Swallow Study is required to rule-out silent aspiration.)    Esophageal Phase of Swallow: No complaints consistent with possible esophageal involvement  Comments: NA          GOALS  Goal #1 The patient will tolerate puree consistency and moderately thick liquids without overt signs or symptoms of aspiration with 100 % accuracy over 1-2 session(s).  In Progress   Goal #2 The patient/family/caregiver will demonstrate understanding and implementation of aspiration precautions and swallow strategies independently over 1-2 session(s).    In Progress   Goal #3 The patient will tolerate trial upgrade of soft solids consistency and mildly thick/thin liquids without overt signs or symptoms of aspiration with 100 % accuracy over 1-2 session(s).  In Progress   Goal #4 The patient will utilize compensatory strategies as outlined by  BSSE (clinical evaluation) including Slow rate, Small bites, Small sips, Multiple swallows, Alternate liquids/solids, No straws, Upright 90 degrees, Upright 90 degrees 30 mins after meal, Liquids via tsp amount only, Eliminate distractions, Feed patient with 1:1 assistance 100 % of the time across 2 sessions.    In Progress     FOLLOW UP  Treatment Plan/Recommendations: Aspiration precautions  Duration: 1 week  Follow Up Needed (Documentation Required): Yes  SLP Follow-up Date: 06/03/25    Thank you for your referral.   If you have any questions, please contact BIGG Jeffery M.S. CCC-SLP  Speech Language Pathologist  Phone Number Ext. [phone]         [1]   Past Medical History:   A-fib (HCC)    CVA (cerebral vascular accident) (HCC)    Diabetes (HCC)    Diverticulosis of large intestine    Scattered pandiverticulosis

## 2025-06-02 NOTE — PLAN OF CARE
RA. Afebrile. IV heparin and Cardizem infusing. Passed swallow eval, started on diet, tolerating well. Call light within reach. Safety precautions in place.     Problem: Diabetes/Glucose Control  Goal: Glucose maintained within prescribed range  Description: INTERVENTIONS:  - Monitor Blood Glucose as ordered  - Assess for signs and symptoms of hyperglycemia and hypoglycemia  - Administer ordered medications to maintain glucose within target range  - Assess barriers to adequate nutritional intake and initiate nutrition consult as needed  - Instruct patient on self management of diabetes  Outcome: Progressing     Problem: CARDIOVASCULAR - ADULT  Goal: Maintains optimal cardiac output and hemodynamic stability  Description: INTERVENTIONS:  - Monitor vital signs, rhythm, and trends  - Monitor for bleeding, hypotension and signs of decreased cardiac output  - Evaluate effectiveness of vasoactive medications to optimize hemodynamic stability  - Monitor arterial and/or venous puncture sites for bleeding and/or hematoma  - Assess quality of pulses, skin color and temperature  - Assess for signs of decreased coronary artery perfusion - ex. Angina  - Evaluate fluid balance, assess for edema, trend weights  Outcome: Progressing  Goal: Absence of cardiac arrhythmias or at baseline  Description: INTERVENTIONS:  - Continuous cardiac monitoring, monitor vital signs, obtain 12 lead EKG if indicated  - Evaluate effectiveness of antiarrhythmic and heart rate control medications as ordered  - Initiate emergency measures for life threatening arrhythmias  - Monitor electrolytes and administer replacement therapy as ordered  Outcome: Progressing     Problem: GASTROINTESTINAL - ADULT  Goal: Maintains or returns to baseline bowel function  Description: INTERVENTIONS:  - Assess bowel function  - Maintain adequate hydration with IV or PO as ordered and tolerated  - Evaluate effectiveness of GI medications  - Encourage mobilization and  activity  - Obtain nutritional consult as needed  - Establish a toileting routine/schedule  - Consider collaborating with pharmacy to review patient's medication profile  Outcome: Progressing     Problem: HEMATOLOGIC - ADULT  Goal: Maintains hematologic stability  Description: INTERVENTIONS  - Assess for signs and symptoms of bleeding or hemorrhage  - Monitor labs and vital signs for trends  - Administer supportive blood products/factors, fluids and medications as ordered and appropriate  - Administer supportive blood products/factors as ordered and appropriate  Outcome: Progressing  Goal: Free from bleeding injury  Description: (Example usage: patient with low platelets)  INTERVENTIONS:  - Avoid intramuscular injections, enemas and rectal medication administration  - Ensure safe mobilization of patient  - Hold pressure on venipuncture sites to achieve adequate hemostasis  - Assess for signs and symptoms of internal bleeding  - Monitor lab trends  - Patient is to report abnormal signs of bleeding to staff  - Avoid use of toothpicks and dental floss  - Use electric shaver for shaving  - Use soft bristle tooth brush  - Limit straining and forceful nose blowing  Outcome: Progressing     Problem: Patient Centered Care  Goal: Patient preferences are identified and integrated in the patient's plan of care  Description: Interventions:  - Provide timely, complete, and accurate information to patient/family  - Incorporate patient and family knowledge, values, beliefs, and cultural backgrounds into the planning and delivery of care  - Encourage patient/family to participate in care and decision-making at the level they choose  - Honor patient and family perspectives and choices  Outcome: Progressing       Problem: Delirium  Goal: Minimize duration of delirium  Description: Interventions:  - Encourage use of hearing aids, eye glasses  - Promote highest level of mobility daily  - Provide frequent reorientation  - Promote  wakefulness i.e. lights on, blinds open  - Promote sleep, encourage patient's normal rest cycle i.e. lights off, TV off, minimize noise and interruptions  - Encourage family to assist in orientation and promotion of home routines  Outcome: Progressing     Problem: RESPIRATORY - ADULT  Goal: Achieves optimal ventilation and oxygenation  Description: INTERVENTIONS:  - Assess for changes in respiratory status  - Assess for changes in mentation and behavior  - Position to facilitate oxygenation and minimize respiratory effort  - Oxygen supplementation based on oxygen saturation or ABGs  - Provide Smoking Cessation handout, if applicable  - Encourage broncho-pulmonary hygiene including cough, deep breathe, Incentive Spirometry  - Assess the need for suctioning and perform as needed  - Assess and instruct to report SOB or any respiratory difficulty  - Respiratory Therapy support as indicated  - Manage/alleviate anxiety  - Monitor for signs/symptoms of CO2 retention  Outcome: Progressing     Problem: GENITOURINARY - ADULT  Goal: Absence of urinary retention  Description: INTERVENTIONS:  - Assess patient’s ability to void and empty bladder  - Monitor intake/output and perform bladder scan as needed  - Follow urinary retention protocol/standard of care  - Consider collaborating with pharmacy to review patient's medication profile  - Implement strategies to promote bladder emptying  Outcome: Progressing     Problem: METABOLIC/FLUID AND ELECTROLYTES - ADULT  Goal: Glucose maintained within prescribed range  Description: INTERVENTIONS:  - Monitor Blood Glucose as ordered  - Assess for signs and symptoms of hyperglycemia and hypoglycemia  - Administer ordered medications to maintain glucose within target range  - Assess barriers to adequate nutritional intake and initiate nutrition consult as needed  - Instruct patient on self management of diabetes  Outcome: Progressing  Goal: Electrolytes maintained within normal  limits  Description: INTERVENTIONS:  - Monitor labs and rhythm and assess patient for signs and symptoms of electrolyte imbalances  - Administer electrolyte replacement as ordered  - Monitor response to electrolyte replacements, including rhythm and repeat lab results as appropriate  - Fluid restriction as ordered  - Instruct patient on fluid and nutrition restrictions as appropriate  Outcome: Progressing  Goal: Hemodynamic stability and optimal renal function maintained  Description: INTERVENTIONS:  - Monitor labs and assess for signs and symptoms of volume excess or deficit  - Monitor intake, output and patient weight  - Monitor urine specific gravity, serum osmolarity and serum sodium as indicated or ordered  - Monitor response to interventions for patient's volume status, including labs, urine output, blood pressure (other measures as available)  - Encourage oral intake as appropriate  - Instruct patient on fluid and nutrition restrictions as appropriate  Outcome: Progressing     Problem: SKIN/TISSUE INTEGRITY - ADULT  Goal: Skin integrity remains intact  Description: INTERVENTIONS  - Assess and document risk factors for pressure ulcer development  - Assess and document skin integrity  - Monitor for areas of redness and/or skin breakdown  - Initiate interventions, skin care algorithm/standards of care as needed  Outcome: Progressing     Problem: MUSCULOSKELETAL - ADULT  Goal: Return mobility to safest level of function  Description: INTERVENTIONS:  - Assess patient stability and activity tolerance for standing, transferring and ambulating w/ or w/o assistive devices  - Assist with transfers and ambulation using safe patient handling equipment as needed  - Ensure adequate protection for wounds/incisions during mobilization  - Obtain PT/OT consults as needed  - Advance activity as appropriate  - Communicate ordered activity level and limitations with patient/family  Outcome: Progressing     Problem: NEUROLOGICAL -  ADULT  Goal: Achieves stable or improved neurological status  Description: INTERVENTIONS  - Assess for and report changes in neurological status  - Initiate measures to prevent increased intracranial pressure  - Maintain blood pressure and fluid volume within ordered parameters to optimize cerebral perfusion and minimize risk of hemorrhage  - Monitor temperature, glucose, and sodium. Initiate appropriate interventions as ordered  Outcome: Progressing

## 2025-06-02 NOTE — PLAN OF CARE
Problem: Diabetes/Glucose Control  Goal: Glucose maintained within prescribed range  Description: INTERVENTIONS:  - Monitor Blood Glucose as ordered  - Assess for signs and symptoms of hyperglycemia and hypoglycemia  - Administer ordered medications to maintain glucose within target range  - Assess barriers to adequate nutritional intake and initiate nutrition consult as needed  - Instruct patient on self management of diabetes  Outcome: Progressing     Problem: CARDIOVASCULAR - ADULT  Goal: Maintains optimal cardiac output and hemodynamic stability  Description: INTERVENTIONS:  - Monitor vital signs, rhythm, and trends  - Monitor for bleeding, hypotension and signs of decreased cardiac output  - Evaluate effectiveness of vasoactive medications to optimize hemodynamic stability  - Monitor arterial and/or venous puncture sites for bleeding and/or hematoma  - Assess quality of pulses, skin color and temperature  - Assess for signs of decreased coronary artery perfusion - ex. Angina  - Evaluate fluid balance, assess for edema, trend weights  Outcome: Progressing     Problem: GASTROINTESTINAL - ADULT  Goal: Maintains or returns to baseline bowel function  Description: INTERVENTIONS:  - Assess bowel function  - Maintain adequate hydration with IV or PO as ordered and tolerated  - Evaluate effectiveness of GI medications  - Encourage mobilization and activity  - Obtain nutritional consult as needed  - Establish a toileting routine/schedule  - Consider collaborating with pharmacy to review patient's medication profile  Outcome: Progressing     Problem: HEMATOLOGIC - ADULT  Goal: Maintains hematologic stability  Description: INTERVENTIONS  - Assess for signs and symptoms of bleeding or hemorrhage  - Monitor labs and vital signs for trends  - Administer supportive blood products/factors, fluids and medications as ordered and appropriate  - Administer supportive blood products/factors as ordered and appropriate  Outcome:  Progressing  Goal: Free from bleeding injury  Description: (Example usage: patient with low platelets)  INTERVENTIONS:  - Avoid intramuscular injections, enemas and rectal medication administration  - Ensure safe mobilization of patient  - Hold pressure on venipuncture sites to achieve adequate hemostasis  - Assess for signs and symptoms of internal bleeding  - Monitor lab trends  - Patient is to report abnormal signs of bleeding to staff  - Avoid use of toothpicks and dental floss  - Use electric shaver for shaving  - Use soft bristle tooth brush  - Limit straining and forceful nose blowing  Outcome: Progressing     Problem: RESPIRATORY - ADULT  Goal: Achieves optimal ventilation and oxygenation  Description: INTERVENTIONS:  - Assess for changes in respiratory status  - Assess for changes in mentation and behavior  - Position to facilitate oxygenation and minimize respiratory effort  - Oxygen supplementation based on oxygen saturation or ABGs  - Provide Smoking Cessation handout, if applicable  - Encourage broncho-pulmonary hygiene including cough, deep breathe, Incentive Spirometry  - Assess the need for suctioning and perform as needed  - Assess and instruct to report SOB or any respiratory difficulty  - Respiratory Therapy support as indicated  - Manage/alleviate anxiety  - Monitor for signs/symptoms of CO2 retention  Outcome: Progressing     Problem: GENITOURINARY - ADULT  Goal: Absence of urinary retention  Description: INTERVENTIONS:  - Assess patient’s ability to void and empty bladder  - Monitor intake/output and perform bladder scan as needed  - Follow urinary retention protocol/standard of care  - Consider collaborating with pharmacy to review patient's medication profile  - Implement strategies to promote bladder emptying  Outcome: Progressing     Problem: METABOLIC/FLUID AND ELECTROLYTES - ADULT  Goal: Glucose maintained within prescribed range  Description: INTERVENTIONS:  - Monitor Blood Glucose as  ordered  - Assess for signs and symptoms of hyperglycemia and hypoglycemia  - Administer ordered medications to maintain glucose within target range  - Assess barriers to adequate nutritional intake and initiate nutrition consult as needed  - Instruct patient on self management of diabetes  Outcome: Progressing   Remains strictly NPO. IV heparin and diltiazem infusing.

## 2025-06-03 PROBLEM — Z71.89 ADVANCE CARE PLANNING: Status: ACTIVE | Noted: 2025-01-01

## 2025-06-03 PROBLEM — Z51.5 PALLIATIVE CARE BY SPECIALIST: Status: ACTIVE | Noted: 2025-01-01

## 2025-06-03 PROBLEM — Z71.89 GOALS OF CARE, COUNSELING/DISCUSSION: Status: ACTIVE | Noted: 2025-01-01

## 2025-06-03 NOTE — CONSULTS
[facility]  part of [facility]  Palliative Care Initial Consult Note    Aldo Valdez Patient Status:  Inpatient    1945 MRN [id number]   Location [facility] 3W/SW Attending Edward Pa MD   Hosp Day # 12 PCP Wyatt Ross DO     Date of Consult: 6/3/2025  Patient seen at: [facility]    The  Cures Act makes medical notes like these available to patients in the interest of transparency. Please be advised this is a medical document. Medical documents are intended to carry relevant information, facts as evident, and the clinical opinion of the practitioner. The medical note is intended as peer to peer communication and may appear blunt or direct. It is written in medical language and may contain abbreviations or verbiage that are unfamiliar.     Reason for Consultation: Consult ordered by:: Dr. Pa for evaluation of Palliative Care needs and Goals of care discussion.    Subjective     History of Present Illness: Aldo Valdez is a 79 year old male with history of dementia, CVA, Afib on Eliquis, HTN, DM type II who was admitted on 2025 for rectal bleeding. See below for reviewed labs and imaging. Pt was admitted for treatment and evaluation of GIB/rectal bleeding, anemia and hypotension. Pt underwent EGD/colonoscopy on 25 which showed pandiverticulosis, large polyp in rectosigmoid junction s/p polypectomy and 3 clips, normal EGD other than hiatal hernia. Hospital course complicated by code blue called on  for increased secretions/angioedema with bradycardia which lead to asystole arrest s/p resuscitation with ROSC->extubated , pt had possible aspiration event on . See below for reviewed imaging.     He is being followed by GI, pulmonary, cardiology, neurology and psych services.    I reviewed labs and imaging-see below. History was obtained from [employer] as pt is a poor historian and unable to reach guardian by phone.  Today is  day 12 of hospitalization.     Pt is listed as Full code in Epic,  no advance directives on file.    Reviewed symptom needs past 24 hrs: none    Patient was seen and examined in bed with no visitors at bedside. Pt is alert and oriented to person, place and year. He is very forgetful and gets easily agitated with too many questions. He tells me he is tired today and is fixated on wanting to drink water. He denies any dyspnea symptoms on RA. He denies any pain. He tells me his appetite is fair, +modified diet, but pt says he doesn't like the thickened liquids, denies abdominal pain, n/v and moved his bowels yesterday. See physical exam and ROS below.    Review of Systems/Palliative Care symptom needs assessed:   Limited ROS due to confusion    Fatigue:  Yes  Dyspnea: denies   Current O2 therapy: RA  Pain Present: denies  Non-verbal signs of pain present: NO  Appetite: fair  Constipation: denies  Diarrhea: denies  Nausea/Vomiting: denies    Medical History: obtained from [facility]  Past Medical History[1]  Past Surgical History[2]    Family History: obtained from [facility]  Family History[3]    Palliative Care Social History:   Marital Status:   Children: none  Living Situation Prior to Admit: Papa at [facility]  Occupational History: Retired, worked for [place] [employer]    Substance History:   reports that he has quit smoking. His smoking use included cigarettes. He has never been exposed to tobacco smoke. He has never used smokeless tobacco.  reports current alcohol use of about 6.0 - 7.0 standard drinks of alcohol per week.  has no history on file for drug use.  Hx of Substance Use/Abuse: No    Spiritual Assessment:   Samaritan: non-Taoism    Allergies:  Allergies[4]    Medications:   Current Hospital Medications[5]    Nutritional status:  BMI: 22 Weight: 152  Weight changes: Pt denies  Current Appetite: fair  Dysphagia: Yes, on modified diet per SLP    Functional Status History:  ADLs: mostly now in bed  and needs help with ADL's, pt says prior to admission he was independent at home  Recent Falls: Yes    Palliative Performance Scale:   Prior to admission: 70%  Observed during hospitalization: 40%  % Ambulation Activity Level Self-Care Intake Consciousness   100 Full  Normal  No Disease Full Normal Full   90 Full  Normal  Some Disease Full Normal Full   80 Full  Normal w/effort  Some Disease Full Normal or reduced Full   70 Reduced  Can't Perform Job  Some Disease Full Normal or reduced Full   60 Reduced  Can't Perform Hobby   Significant Disease Occ Assist Normal or reduced Full or confused   50 Mainly sit/lie Can't do any work  Extensive Disease Partial Assist Normal or reduced Full or confused   40 Mainly in bed Can't do any work  Extensive Disease Mainly Assist Normal or reduced Full or confused   30 Bed Bound Can't do any work  Extensive Disease Max Assist  Total Care Reduced  Drowsy/confused   20 Bed Bound Can't do any work  Extensive Disease Max Assist  Total Care Minimal  Drowsy/confused   10 Bed Bound Can't do any work  Extensive Disease Max Assist  Total Care Mouth Care  Drowsy/confused   0 Death        Objective      Vital Signs:  Blood pressure (!) 134/113, pulse 107, temperature 97.5 °F (36.4 °C), temperature source Oral, resp. rate 20, height 5' 9\" (1.753 m), weight 152 lb 12.5 oz (69.3 kg), SpO2 91%.  Body mass index is 22.56 kg/m².  Present Level of pain: 0/10  Non-verbal signs of pain present: No    Physical Exam:  General: Alert and in no apparent distress. Weak appearing  HEENT: No focal deficits. +dry mucous membranes  Cardiac: Tachycardic, Regular rate and rhythm, S1, S2 normal, no murmur, rub or gallop.  Lungs: Coarse breath sounds bilaterally. Normal excursions and effort.  Abdomen: Soft, non-tender, normal bowel sounds X 4 quadrants, no rebound or guarding  Extremities: Without clubbing, cyanosis. Peripheral pulses are 2+. BLE Edema not present  Neurologic: Alert and oriented X2-3, forgetful,  follows commands  Skin: Warm and dry.    Hematology:  Lab Results   Component Value Date    WBC 11.9 (H) 2025    HGB 8.5 (L) 2025    HCT 27.8 (L) 2025    .0 (H) 2025       Coags:  Lab Results   Component Value Date    INR 1.47 (H) 2025    PTT 46.5 (H) 2025       Chemistry:  Lab Results   Component Value Date    CREATSERUM 1.54 (H) 2025    BUN 29 (H) 2025     (H) 2025    K 3.4 (L) 2025     (H) 2025    CO2 23.0 2025     (H) 2025    CA 8.1 (L) 2025    ALB 3.4 2025    ALKPHO 64 2025    BILT 1.2 (H) 2025    TP 6.4 2025    AST 39 (H) 2025    ALT 46 2025    MG 2.0 2025    PHOS 2.8 2025       Imagin/22/25 EGD/colonoscopy  Post-Operative Diagnosis:   Scattered pandiverticulosis  Old blood noted throughout the entire colon.  Multiple washings performed.  No active bleeding was identified throughout the entire procedure despite prolonged procedure and multiple washings.  Large pedunculated polyp 3 cm in the rectosigmoid junction at about 20cms from the anal verge.  This was erythematous.  It was not actively bleeding at the time of the scope however I cannot rule out this being the source of recent bleeding in the setting of anticoagulation especially with the CT angiogram showing positive bleed in this same region.  Because no other active bleeding was noted throughout the entire procedure the decision was made to remove this large polyp with hot cautery snare at the base of the polyp.  The post polypectomy site was closed with 3 clips.  There was evidence of old blood in the terminal ileum as well  Grade II internal and external hemorrhoids noted     EGD;  Normal esophagus and Z-line.  2 cm sliding hiatal hernia.  No esophageal varices.  Normal gastric examination.  No blood or bleeding source identified.  Retroflexed view was unremarkable.  Normal duodenum to  the third portion    25 CT Brain  CONCLUSION:   1. No acute intracranial finding.   2. 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   3. Moderate changes of chronic small vessel disease in cerebral white matter.   4. Chronic left cerebellar lacunar infarct related to small vessel disease.   5. Large vessel intracranial atherosclerosis.   6. Evaluation degraded by patient related motion artifact.     25 CT A/P  FINDINGS:   Small active blush from the sigmoid colon series 12, image 51      Small volume hyperdense fluid throughout the rectosigmoid.  Large volume low-density colonic fluid      Diffuse sigmoid diverticulosis but no diverticulitis.  No colitis.  No perforation or obstruction.  No ascites      Trace basilar effusions      Normal liver and gallbladder.  Normal pancreas, spleen, adrenals.  Unobstructed kidneys.  Bilateral renal cysts.      Normal-size widely patent aorta and proximal mesenteric arteries      Normal urinary bladder      Impression:  Active GI bleed in the sigmoid colon       25 CXR  CONCLUSION:      New left basilar opacities, pneumonia versus aspiration.      Persistent mild pulmonary edema.       CARD ECHO 2D DOPPLER (CPT=93306)  Result Date: 2025  Transthoracic Echocardiogram Name:Aldo Valdez Date: 2025 :  1945 Ht:  (69in)  BP: 143 / 86 MRN:  [id number]    Age:  79years    Wt:  (157lb) HR: 89bpm Loc:  [facility]       Gndr: M          BSA: 1.86m^2 Sonographer: CHARLES Ann AE, PE Ordering:    Clem Díaz Consulting:  Yadira Tobin ---------------------------------------------------------------------------- History/Indications:  CV arrest. ---------------------------------------------------------------------------- Procedure information:  A transthoracic complete 2D study was performed. Additional evaluation included M-mode, complete spectral Doppler, and color Doppler.  Patient status:  Inpatient.  Location:  Bedside.    This was a routine  study.  Image quality was adequate. The study was technically limited due to patient supine. ECG rhythm:   Atrial flutter ---------------------------------------------------------------------------- Conclusions: 1. Left ventricle: The cavity size was normal. Wall thickness was normal.    Systolic function was normal. The estimated ejection fraction was 55-60%,    by biplane method of disks. Wall motion is normal; there are no regional    wall motion abnormalities. Unable to assess LV diastolic function due to    heart rhythm. 2. Right ventricle: The cavity size was normal. Systolic function was    normal. 3. Left atrium: The atrial volume was mildly increased. 4. Aortic valve: There was thickening, consistent with sclerosis. Impressions:  No previous study was available for comparison. * ---------------------------------------------------------------------------- * Findings: Left ventricle:  The cavity size was normal. Wall thickness was normal. Systolic function was normal. The estimated ejection fraction was 55-60%, by biplane method of disks. Wall motion is normal; there are no regional wall motion abnormalities. Unable to assess LV diastolic function due to heart rhythm. Left atrium:  The atrial volume was mildly increased. Right ventricle:  The cavity size was normal. Systolic function was normal. Right atrium:  The atrium was normal in size. Mitral valve:  The annulus was mildly calcified. Leaflet separation was normal.  Doppler:  Transvalvular velocity was within the normal range. There was no evidence for stenosis. There was trivial regurgitation. Aortic valve:  The annulus was mildly calcified. The valve was trileaflet. The leaflets were mildly thickened. There was thickening, consistent with sclerosis. Cusp separation was normal.  Doppler:  Transvalvular velocity was within the normal range. There was no evidence for stenosis. There was no significant regurgitation. Tricuspid valve:  The valve is  structurally normal. Leaflet separation was normal.  Doppler:  Transvalvular velocity was within the normal range. There was no evidence for stenosis. There was no significant regurgitation. Pulmonic valve:   The valve is structurally normal. Cusp separation was normal.  Doppler:  Transvalvular velocity was within the normal range. There was no evidence for stenosis. There was no significant regurgitation. Pericardium:   There was no pericardial effusion. Aorta: Aortic root: The aortic root was normal-sized. Ascending aorta: The ascending aorta was normal. Pulmonary arteries: The main pulmonary artery was normal-sized. Systemic veins:  Central venous respirophasic diameter changes are blunted (< 50%). Inferior vena cava: The IVC was dilated. ---------------------------------------------------------------------------- Measurements  Left ventricle                    Value        Ref  IVS thickness, ED, PLAX       (H) 1.1   cm     0.6 - 1.0  LV ID, ED, PLAX                   5.1   cm     4.2 - 5.8  LV ID, ES, PLAX                   3.3   cm     2.5 - 4.0  LV PW thickness, ED, PLAX         0.6   cm     0.6 - 1.0  IVS/LV PW ratio, ED, PLAX         1.83         ---------  LV PW/LV ID ratio, ED, PLAX       0.12         ---------  LV ejection fraction              64    %      52 - 72  LV end-diastolic volume, 1-p      94    ml     69 - 185  A4C  LV ejection fraction, 1-p A4C     54    %      46 - 74  Stroke volume, 1-p A4C            51    ml     ---------  LV end-diastolic volume/bsa,      50    ml/m^2 37 - 93  1-p A4C  Stroke volume/bsa, 1-p A4C        27    ml/m^2 ---------  Aortic root                       Value        Ref  Aortic root ID                    3.6   cm     2.6 - 4.0  Left atrium                       Value        Ref  LA ID, A-P, ES                    3.6   cm     3.0 - 4.0  LA volume, ES, 1-p A4C            58    ml     18 - 58  LA volume, ES, 1-p A2C            57    ml     18 - 58  LA volume, ES, A/L                 67    ml     ---------  LA volume/bsa, ES, A/L        (H) 36    ml/m^2 16 - 34  LA/aortic root ratio              1            ---------  Right ventricle                   Value        Ref  TAPSE, 2D                     (L) 1.56  cm     >=1.70  TAPSE, MM                     (L) 1.56  cm     >=1.70  RV s', lateral                    12.8  cm/sec >=9.5 Legend: (L)  and  (H)  aminata values outside specified reference range. ---------------------------------------------------------------------------- Prepared and electronically signed by Clem Díaz 05/25/2025 10:58        Summary of GOC Discussion      I attempted to call pt's guardian Mica listed on face sheet and LM regarding palliative care consult.  There is no guardian paperwork in the pt's hard chart or EPIC and asked SW to try to obtain this from pt's living facility. I left palliative care brochure/[place] POLST form at bedside.    Addendum:345p  I discussed reason for palliative care consultation with patient's guardian Mica Jonathan by phone. He says he is already somewhat familiar with palliative care from other [facility]. Mica says he is primary guardian, but also has associates Sonali and Kevin who are also involved in pt's care.    I differentiated the palliative treatment-focus model versus the hospice comfort-focused philosophy of care. I informed the patient/family that having palliative care support does not limit medical treatment options or decisions to those who wish to continue curative or restorative medical therapies. I discussed the benefits of palliative care to include assistance with arising symptom management needs, an extra layer of support, to ensure GOC are respected throughout healthcare continuum, and assist with transition to hospice care when appropriate.  Palliative care handout emailed to Mica at [email or web address].    Outpatient/Community Palliative Care Services:  Usually visit once per 4 weeks depending on contracted  agency guidelines  Focus on GOC and symptom management   Palliative Care criteria:  Not altered by prognosis   Does not limit curative or restorative therapies      Outpatient Hospice services:  24/7 phone triage services   RN visit one or more times per week depending on need  Home health aid to assist in ADLs/hygiene   Hospice criteria:  Less than six-month prognosis   Must forego most life-prolonging  measures/treatments   Focus solely on comfort   Must sign onto hospice benefit with agency     Background provided by guardian:  -Juanito says they have already been working on getting pt moved out of [place] to [facility] care as he now has increased care needs.     Prognostic awareness/understanding: Still in informational gathering stage    -I  discussed current clinical condition and explored previous discussions with MDs.    -I discussed the normal disease trajectory of advancing dementia, CVA, dysphagia with associated symptoms and decline over time.     Hopes/goals/concerns:   -Juanito tells me he wants to continue with supportive care and hopes pt will improve.    -Juanito talked with me about plans for NANCY on dc and then transitioning to memory care at [facility] after. I recommended having a community palliative care program follow on dc and Juanito is in agreement with this. Juanito would consider re-hospitalization if needed.    -I also addressed pt's dysphagia issues and lack of enjoyment with modified diet. Discussed he will be at risk for recurrent aspiration pneumonia and provided education on the risks vs benefits of feeding tubes in the setting of dementia. Did not recommend feeding tube and discussed this does not prevent further aspiration events or improve QOL. Juanito says he would likely not pursue any feeding tubes.    -Discussed ongoing GOC discussions will be needed over time.    Advance Care Planning counseling and discussion:     -I discussed the importance of advance care planning prior  to crisis with guardian Mica.     -I addressed pt's full code status discussed the risks vs benefits of life sustaining treatments in the setting of advancing age and in his clinical picture. Education provided on what DNAR entails and encouraged setting limits. Mica is not ready to set limits right now and discussed the legal process to get code status changed. Pt will remain full code. I did [place] him a [place] of [place] for [place].     -Provided emotional support to pt/Mica who are coping adequately.       Assessment and Recommendations      Problem List:    Aspiration pneumonia  Acute respiratory failure-resolved  Witnessed bradycardic CV arrest on 5/24/25  Tremors  Rectal bleeding  Acute blood loss anemia  Afib  ZOE on CKD  Hypernatremia  Dementia  Dysphagia  Moderate Malnutrition-per dietician recs  Delirium  HTN  H/o CVA  DM type II  Weakness    Goals of care counseling  -see above for details  -Confirmed wishes for full code and continue supportive care.  -Discussed pt's ongoing dysphagia issues and aspiration risk with guardian-he says he would likely not pursue feeding tube placement in the future. Will need further discussions if arises.  -Ongoing GOC discussions will be needed over time.  -Dispo:  NANCY and recommended community palliative care program to follow on dc. Plans for pt to be moved to [facility] memory care after rehab. SW to help with dc planning.   -Provided emotional support to pt/guardian who are coping adequately.    Advance care planning  -see above for details  -Pt has Gavin diegoan appointed-Mica Jonathan #[phone].  -Guardian paperwork on file in EPIC.    -Emailed palliative care info/ [place] for review to guardian ([email or web address]    Palliative Performance Scale 40%    Emotion support provided to patient/family today: Yes    A total of 80 mins were spent on this consult, which included all of the following:direct face to face contact, history taking,  physical examination, and >50% was spent counseling and coordinating care.    Discussed today's visit with message to Deloris Berry RN and FAITH Zamora.    I will sign off from active follow up as C established and follow peripherally. Please call if needed again.    Thank you for allowing Palliative Care services to participate in the care of Mr. Aldo Valdez.     Enedina Garsia, ANP-BC, [facility] [id number]  6/3/2025  4:15 PM  Palliative Care Services          [1]   Past Medical History:   A-fib (HCC)    CVA (cerebral vascular accident) (HCC)    Diabetes (HCC)    Diverticulosis of large intestine    Scattered pandiverticulosis   [2]   Past Surgical History:  Procedure Laterality Date    Colonoscopy N/A 05/22/2025    EGD/COLONOSCOPY;  Surgeon: Cesar Byrne MD;  Location: [facility] ENDOSCOPY   [3] No family history on file.  [4]   Allergies  Allergen Reactions    Lisinopril ANGIOEDEMA     Per Dr. Fonseca, continue to hold Lisinopril as Pt is an unreliable historian & Pt experiences cardiopulmonary arrest in which angioedema from Lisinopril is suspected;   [5]   Current Facility-Administered Medications:     dextrose 5% infusion, , Intravenous, Continuous    LORazepam (Ativan) 2 mg/mL injection 0.5 mg, 0.5 mg, Intravenous, TID    mirtazapine (Remeron SolTab) disintegrating tab 15 mg, 15 mg, Oral, Nightly    thiamine 100 mg/mL injection 100 mg, 100 mg, Intravenous, Daily    cefTRIAXone (Rocephin) 2 g in sodium chloride 0.9% 100 mL IVPB-ADDV, 2 g, Intravenous, Q24H    heparin (Porcine) 91551 units/250mL infusion ACS/AFIB CONTINUOUS, 200-3,000 Units/hr, Intravenous, Continuous    LORazepam (Ativan) 2 mg/mL injection 0.5 mg, 0.5 mg, Intravenous, Q6H PRN    dilTIAZem (cardIZEM) tab 90 mg, 90 mg, Oral, 4 times per day    ergocalciferol (Vitamin D2) cap 50,000 Units, 50,000 Units, Oral, Weekly    ipratropium-albuterol (Duoneb) 0.5-2.5 (3) MG/3ML inhalation solution 3 mL, 3 mL, Nebulization, Q6H PRN    insulin aspart (NovoLOG)  100 Units/mL FlexPen 1-5 Units, 1-5 Units, Subcutaneous, TID CC    LORazepam (Ativan) 2 mg/mL injection 0.5 mg, 0.5 mg, Intravenous, Daily PRN    dilTIAZem 10 mg BOLUS FROM BAG infusion, 10 mg, Intravenous, Q1H PRN    dilTIAZem (cardIZEM) 100 mg in sodium chloride 0.9% 100 mL IVPB-ADDV, 2.5-20 mg/hr, Intravenous, Continuous    metoprolol tartrate (Lopressor) tab 25 mg, 25 mg, Oral, 2x Daily(Beta Blocker)    acetaminophen (Tylenol) tab 650 mg, 650 mg, Oral, Q4H PRN    ondansetron (Zofran) 4 MG/2ML injection 4 mg, 4 mg, Intravenous, Q6H PRN    metoclopramide (Reglan) 5 mg/mL injection 5 mg, 5 mg, Intravenous, Q8H PRN    glucose (Dex4) 15 GM/59ML oral liquid 15 g, 15 g, Oral, Q15 Min PRN **OR** glucose (Glutose) 40% oral gel 15 g, 15 g, Oral, Q15 Min PRN **OR** glucose-vitamin C (Dex-4) chewable tab 4 tablet, 4 tablet, Oral, Q15 Min PRN **OR** dextrose 50% injection 50 mL, 50 mL, Intravenous, Q15 Min PRN **OR** glucose (Dex4) 15 GM/59ML oral liquid 30 g, 30 g, Oral, Q15 Min PRN **OR** glucose (Glutose) 40% oral gel 30 g, 30 g, Oral, Q15 Min PRN **OR** glucose-vitamin C (Dex-4) chewable tab 8 tablet, 8 tablet, Oral, Q15 Min PRN

## 2025-06-03 NOTE — OCCUPATIONAL THERAPY NOTE
OCCUPATIONAL THERAPY TREATMENT NOTE - INPATIENT        Room Number: 314/314-A     Presenting Problem: Acute GI hemmorhage    Problem List  Principal Problem:    Acute gastrointestinal hemorrhage  Active Problems:    Tremor    Mixed Alzheimer and vascular dementia (HCC)    Generalized anxiety disorder    Alcohol abuse, continuous    Episodic mood disorder    Delirium due to another medical condition      OCCUPATIONAL THERAPY ASSESSMENT   Patient demonstrates limited progress this session, goals remain in progress.    Patient is requiring maximum assist and dependent as a result of the following impairments: decreased functional strength, decreased functional reach, decreased endurance, pain, decreased muscular endurance, cognitive deficits (impaired problem solving, executive functioning, confusion), medical status, and limited LUE ROM.    Patient continues to function below baseline with toileting, bathing, upper body dressing, lower body dressing, grooming, eating, bed mobility, and transfers.  Next session anticipate patient to progress toileting, bathing, upper body dressing, lower body dressing, grooming, eating, bed mobility, and transfers.  Occupational Therapy will continue to follow patient for duration of hospitalization.    Patient continues to benefit from continued skilled OT services: to promote return to prior level of function and safety with continuous assistance and gradual rehabilitative therapy.     PLAN DURING HOSPITALIZATION  OT Treatment Plan: Balance activities, ADL training, Functional transfer training, UE strengthening/ROM, Cognitive reorientation, Patient/Family education, Patient/Family training, Equipment eval/education     SUBJECTIVE  \"Watch the shoulder\"     OBJECTIVE  Precautions: Bed/chair alarm    PAIN ASSESSMENT  Rating: Unable to rate  Location: LUE    ACTIVITY TOLERANCE  Pulse: (!) 124  Heart Rate Source: Monitor  BP: 125/86  BP Location: Right arm  BP Method: Automatic  Patient  Position: Sitting    O2 SATURATIONS  Oxygen Therapy  SPO2% on Room Air at Rest: 93    ACTIVITIES OF DAILY LIVING ASSESSMENT  AM-PAC ‘6-Clicks’ Inpatient Daily Activity Short Form  How much help from another person does the patient currently need…  -   Putting on and taking off regular lower body clothing?: Total  -   Bathing (including washing, rinsing, drying)?: Total  -   Toileting, which includes using toilet, bedpan or urinal? : Total  -   Putting on and taking off regular upper body clothing?: A Lot  -   Taking care of personal grooming such as brushing teeth?: A Lot  -   Eating meals?: A Lot    AM-PAC Score:  Score: 9  Approx Degree of Impairment: 79.59%  Standardized Score (AM-PAC Scale): 25.33  CMS Modifier (G-Code): CL    BED MOBILITY  Supine to Sit : Dependent  Sit to Supine (OT): Dependent    BALANCE ASSESSMENT  Static Sitting: Maximum Assist    Skilled Therapy Provided: RN approved session, completed in collaboration with PT. Pt agreeable to session, presents with impaired problem solving, executive functioning, and mild confusion. Pt appears with functional decline from previous session; may have to downgrade goals next session. C/o of LUE pain with movement (40 degrees shoulder flexion in gravity eliminated, strength 2-/5). Dep (max of 2) for supine to sit, unable to sit unsupported d/t whole body tremors (max A). Required Tribal cues to hold on to bed rail for support. Pt increasingly agitated in sitting, dep for sit to supine. Repositioned pt's UE with pillows for comfort. Left in bed with needs met and call light within reach.     The patient's Approx Degree of Impairment: 79.59% has been calculated based on documentation in the [facility] '6 clicks' Inpatient Daily Activity Short Form.  Research supports that patients with this level of impairment may benefit from rehab.  Final disposition will be made by interdisciplinary medical team.    Patient End of Session: In bed, Needs met, Call light within reach,  RN aware of session/findings, All patient questions and concerns addressed, Hospital anti-slip socks, Alarm set, With  staff    OT Goals:  Patients self stated goal is: unstated      Patient will complete functional transfer with mod I  Comment: NT    Patient will complete toileting with mod I  Comment: NT    Patient will tolerate standing for 3 minutes in prep for adls with mod I   Comment: NT    Patient will complete item retrieval with mod I  Comment: NT            Goals  on: 25  Frequency: 3-5x/week    OT Session Time: 15 minutes  TA: 15 minutes    ANGELA Davenport, [facility]  Inpatient Rehabilitation  Occupational Therapy  [phone]

## 2025-06-03 NOTE — CM/SW NOTE
06/03/25 1246   Choice of Post-Acute Provider   Informed patient of right to choose their preferred provider Yes   List of appropriate post-acute services provided to patient/family with quality data Yes   Patient/family choice [facility]   Information given to Guardian/HCPOA/Surrogate  (Guardian Juanito)     12:45PM  SW spoke to pt's Guardian Juanito via phone - confirmed he reviewed [facility] list and choice is [facility].    [facility] reserved in [place].    Per RN Deloris, plan for Palliative Care consult as well.    FAITH spoke to [employer] w/ [facility] (max assist, AOX1) set on WILL CALL through 6/6. PCS completed and will need date added day of actual DC.    02:10PM  SW spoke to Brook (phone #: [phone]) w/ [facility]. Requested copy of pt's Guardianship document.    Brook confirmed she will f/up and have it emailed to FAITH.    FAITH to place copy on pt's paperchart and send copy to registration for upload to Epic once document is received.    03:00PM  Received email w/ copy of pt's Guardian paperwork.  FAITH placed a copy on pt's paperchart. FAITH also sent a copy to Tiffany w/ Registration via tube # [phone] - requested she upload to pt's Epic.    PLAN: [facility], Ambulance on WC, PCS needs date - pending med clear      SW/CM to remain available for support and/or discharge planning.         MS SeraW, LSW [id number]

## 2025-06-03 NOTE — PROGRESS NOTES
[facility]  part of [facility]    Cardiology Progress Note    Aldo Valdez Patient Status:  Inpatient    1945 MRN [id number]   Location [facility] 3W/SW Attending Edward Pa MD   Hosp Day # 12 PCP Wyatt Ross DO       Impression/Plan:  79 year old male presenting with:     1. witnessed bradycardic CV arrest (25), +throat swelling- anaphylactic reaction? (unknown culprit medication)--> steroids/benadryl, holding lisinopril     2. pAF on apixaban--> GIB, Hg 6.4 s/p prbc transfusion--> AC held, EGD/C-scope 25: diverticulosis (likely cause), polyp s/p cautery/snare.     3. Aspiration PNA?     4.  UTI     - TTE this admission with normal LV function, no significant valve disease  - Cont diltiazem gtt for rate control; resume po agents when patient able to take po  - Eliquis held 2/2 NPO status; currently on heparin drip  - IV lasix PRN  - Monitor on tele  - Will follow     Subjective: No events overnight.  Today patient without acute complaints    Problem List[1]    Objective:   Temp: 99.1 °F (37.3 °C)  Pulse: 106  Resp: 20  BP: 152/85    Intake/Output:     Intake/Output Summary (Last 24 hours) at 6/3/2025 0930  Last data filed at 6/3/2025 0551  Gross per 24 hour   Intake 1439 ml   Output 1000 ml   Net 439 ml       Last 3 Weights   25 0518 152 lb 12.5 oz (69.3 kg)   25 0500 154 lb 5.2 oz (70 kg)   25 0400 156 lb 8.4 oz (71 kg)   25 0200 151 lb 14.4 oz (68.9 kg)   25 2238 157 lb 6.5 oz (71.4 kg)   25 1426 180 lb (81.6 kg)   10/08/24 1853 180 lb (81.6 kg)   19 0857 188 lb (85.3 kg)       Tele: AF, PVCs    Physical Exam:    General: Alert and oriented x 3. No apparent distress. No respiratory or constitutional distress.  HEENT: Normocephalic, anicteric sclera, neck supple, no thyromegaly or adenopathy.  Neck: No JVD, carotids 2+, no bruits.  Cardiac: Irregularly irregular  Lungs: Clear without wheezes, rales, rhonchi or dullness.   Normal excursions and effort.  Abdomen: Soft, non-tender. No organosplenomegally, mass or rebound, BS-present.  Extremities: Without clubbing, cyanosis or edema.  Peripheral pulses are 2+.  Neurologic: Alert and oriented, normal affect. No motor or coordinational deficit.  Skin: Warm and dry.     Laboratory/Data:    Labs:         Recent Labs   Lab 05/31/25  0646 06/01/25  0130 06/01/25  1041 06/02/25  0600 06/03/25  0542   WBC 15.5* 13.8* 13.6* 13.7* 11.9*   HGB 8.5* 7.6* 8.2* 7.6*  7.7* 8.5*   MCV 83.5 84.9 83.7 83.1 84.2   .0 393.0 463.0* 520.0* 539.0*       Recent Labs   Lab 05/29/25 0425 05/30/25 0412 05/31/25  0449 06/01/25  0130 06/01/25  0518 06/01/25  1041 06/02/25  0600 06/03/25  0542    144 143  --   --  147*  --  152*   K 3.2* 3.7  3.7 3.3* 3.4*  --  3.6 3.6 3.4*    111 108  --   --  114*  --  120*   CO2 23.0 25.0 25.0  --   --  23.0  --  23.0   BUN 27* 26* 26*  --   --  26*  --  29*   CREATSERUM 1.26 1.23 1.30 1.52*  --  1.48*  --  1.54*   CA 8.0* 7.9* 8.1*  --   --  8.1*  --  8.1*   MG 1.8 2.1  --   --  2.0  --   --   --    PHOS  --   --   --   --  2.8  --   --   --    * 130* 109*  --   --  109*  --  132*       Recent Labs   Lab 05/28/25  0405 05/29/25 0425 05/30/25 0412   ALT 28 43 46   AST 24 42* 39*   ALB 3.5 3.3 3.4       No results for input(s): \"TROP\" in the last 168 hours.    Allergies:   Allergies[2]    Medications:  Current Hospital Medications[3]    Kaiser Byrne MD  6/3/2025  9:30 AM         [1]   Patient Active Problem List  Diagnosis    Essential hypertension    Back pain    Pure hypercholesterolemia    Cerebral infarction involving posterior cerebral artery, right (HCC)    Anxiety    Acute gastrointestinal hemorrhage    Tremor    Mixed Alzheimer and vascular dementia (HCC)    Generalized anxiety disorder    Alcohol abuse, continuous    Episodic mood disorder    Delirium due to another medical condition   [2]   Allergies  Allergen Reactions    Lisinopril  ANGIOEDEMA     Per Dr. Fonseca, continue to hold Lisinopril as Pt is an unreliable historian & Pt experiences cardiopulmonary arrest in which angioedema from Lisinopril is suspected;   [3]   Current Facility-Administered Medications   Medication Dose Route Frequency    potassium chloride 40 mEq in 250mL sodium chloride 0.9% IVPB premix  40 mEq Intravenous Once    dextrose 5% infusion   Intravenous Continuous    LORazepam (Ativan) 2 mg/mL injection 0.5 mg  0.5 mg Intravenous TID    mirtazapine (Remeron SolTab) disintegrating tab 15 mg  15 mg Oral Nightly    thiamine 100 mg/mL injection 100 mg  100 mg Intravenous Daily    cefTRIAXone (Rocephin) 2 g in sodium chloride 0.9% 100 mL IVPB-ADDV  2 g Intravenous Q24H    heparin (Porcine) 66009 units/250mL infusion ACS/AFIB CONTINUOUS  200-3,000 Units/hr Intravenous Continuous    LORazepam (Ativan) 2 mg/mL injection 0.5 mg  0.5 mg Intravenous Q6H PRN    [Held by provider] dilTIAZem (cardIZEM) tab 90 mg  90 mg Oral 4 times per day    ergocalciferol (Vitamin D2) cap 50,000 Units  50,000 Units Oral Weekly    ipratropium-albuterol (Duoneb) 0.5-2.5 (3) MG/3ML inhalation solution 3 mL  3 mL Nebulization Q6H PRN    insulin aspart (NovoLOG) 100 Units/mL FlexPen 1-5 Units  1-5 Units Subcutaneous TID CC    LORazepam (Ativan) 2 mg/mL injection 0.5 mg  0.5 mg Intravenous Daily PRN    dilTIAZem 10 mg BOLUS FROM BAG infusion  10 mg Intravenous Q1H PRN    dilTIAZem (cardIZEM) 100 mg in sodium chloride 0.9% 100 mL IVPB-ADDV  2.5-20 mg/hr Intravenous Continuous    [Held by provider] metoprolol tartrate (Lopressor) tab 25 mg  25 mg Oral 2x Daily(Beta Blocker)    acetaminophen (Tylenol) tab 650 mg  650 mg Oral Q4H PRN    ondansetron (Zofran) 4 MG/2ML injection 4 mg  4 mg Intravenous Q6H PRN    metoclopramide (Reglan) 5 mg/mL injection 5 mg  5 mg Intravenous Q8H PRN    glucose (Dex4) 15 GM/59ML oral liquid 15 g  15 g Oral Q15 Min PRN    Or    glucose (Glutose) 40% oral gel 15 g  15 g Oral Q15 Min  PRN    Or    glucose-vitamin C (Dex-4) chewable tab 4 tablet  4 tablet Oral Q15 Min PRN    Or    dextrose 50% injection 50 mL  50 mL Intravenous Q15 Min PRN    Or    glucose (Dex4) 15 GM/59ML oral liquid 30 g  30 g Oral Q15 Min PRN    Or    glucose (Glutose) 40% oral gel 30 g  30 g Oral Q15 Min PRN    Or    glucose-vitamin C (Dex-4) chewable tab 8 tablet  8 tablet Oral Q15 Min PRN

## 2025-06-03 NOTE — CONSULTS
[facility]  part of [facility]    Report of Consultation    Aldo Valdez Patient Status:  Inpatient    1945 MRN [id number]   Location [facility] 3W/SW Attending Edward Pa MD   Hosp Day # 12 PCP Wyatt Ross DO     Date of Admission:  2025  Date of Consult:  6/3/2025  Reason for Consultation:   Hypernatremia  ZOE    History of Present Illness:   Patient is a 79 year old male with PMH of dementia CVA, Afib, HTN, DM2, CKD admitted with rectal bleeding, hospital course complicated by cardiac arrest, were consulted for hypernatremia and ZOE.    Patient is thirsty at this time asking for water otherwise denies having any other symptoms at this time.    Past Medical History  Past Medical History[1]    Past Surgical History  Past Surgical History[2]    Family History  Family History[3]    Social History  Short Social Hx on File[4]        Current Medications:  Current Hospital Medications[5]  Prescriptions Prior to Admission[6]    Allergies  Allergies[7]    Review of Systems:   Review of systems not obtained due to patient factors.    Physical Exam:   Vital Signs:  Blood pressure (!) 134/113, pulse 107, temperature 97.5 °F (36.4 °C), temperature source Oral, resp. rate 20, height 5' 9\" (1.753 m), weight 152 lb 12.5 oz (69.3 kg), SpO2 91%.  General: cachetic awake  HEENT: Dry mucous membranes.  Respiratory: Clear to auscultation  Cardiovascular: S1, S2. No rubs  Abdomen: Soft, +BS  Ext: No LE edema      Results:     Laboratory Data:  Lab Results   Component Value Date    WBC 11.9 (H) 2025    HGB 8.5 (L) 2025    HCT 27.8 (L) 2025    .0 (H) 2025    CREATSERUM 1.57 (H) 2025    BUN 28 (H) 2025     (H) 2025    K 3.7 2025     (H) 2025    CO2 22.0 2025     (H) 2025    CA 8.4 (L) 2025    ALB 3.4 2025    ALKPHO 64 2025    TP 6.4 2025    AST 39 (H) 2025    ALT 46  05/30/2025    PTT 51.3 (H) 06/03/2025    INR 1.47 (H) 05/23/2025    PTP 18.6 (H) 05/23/2025    T4F 0.8 05/29/2025    TSH 1.122 05/29/2025    MG 2.0 06/01/2025    PHOS 2.8 06/01/2025    B12 477 05/29/2025         Imaging:  No results found.       Impression/plan:   79 year old male with PMH of dementia CVA, Afib, HTN, DM2, CKD admitted with rectal bleeding, hospital course complicated by cardiac arrest, were consulted for hypernatremia and ZOE:    Hypernatremia:  - secondary to decreased PO intake in the setting of NPO status  - total water deficit 2.97  - on D5W@75cc/hr - increase to 100cc/hr  - repeat BMP this evening    ZOE on CKD:  - Baseline creatinine variable but more recently 1.3  - remains above suspected baseline  - Will obtain urine studies  - check PVD  - avoid nephrotoxins  - renally adjust medications  - No acute indication for RRT.    Thank you for allowing me to participate in the care of your patient.    Glenn Crews MD  [facility]  Nephrology           [1]   Past Medical History:   A-fib (HCC)    CVA (cerebral vascular accident) (HCC)    Diabetes (HCC)    Diverticulosis of large intestine    Scattered pandiverticulosis   [2]   Past Surgical History:  Procedure Laterality Date    Colonoscopy N/A 05/22/2025    EGD/COLONOSCOPY;  Surgeon: Cesar Byrne MD;  Location: [facility] ENDOSCOPY   [3] No family history on file.  [4]   Social History  Socioeconomic History    Marital status:    Tobacco Use    Smoking status: Former     Types: Cigarettes     Passive exposure: Never    Smokeless tobacco: Never   Vaping Use    Vaping status: Never Used   Substance and Sexual Activity    Alcohol use: Yes     Alcohol/week: 6.0 - 7.0 standard drinks of alcohol     Types: 3 - 4 Standard drinks or equivalent, 3 Cans of beer per week     Social Drivers of Health     Food Insecurity: Patient Unable To Answer (5/28/2025)    NCSS - Food Insecurity     Worried About Running Out of Food in the Last Year: Patient  unable to answer     Ran Out of Food in the Last Year: Patient unable to answer   Transportation Needs: Patient Unable To Answer (5/28/2025)    NCSS - Transportation     Lack of Transportation: Patient unable to answer   Housing Stability: Patient Unable To Answer (5/28/2025)    NCSS - Housing/Utilities     Has Housing: Patient unable to answer     Worried About Losing Housing: Patient unable to answer     Unable to Get Utilities: Patient unable to answer   [5]   Current Facility-Administered Medications   Medication Dose Route Frequency    dextrose 5% infusion   Intravenous Continuous    LORazepam (Ativan) 2 mg/mL injection 0.5 mg  0.5 mg Intravenous TID    mirtazapine (Remeron SolTab) disintegrating tab 15 mg  15 mg Oral Nightly    thiamine 100 mg/mL injection 100 mg  100 mg Intravenous Daily    cefTRIAXone (Rocephin) 2 g in sodium chloride 0.9% 100 mL IVPB-ADDV  2 g Intravenous Q24H    heparin (Porcine) 02545 units/250mL infusion ACS/AFIB CONTINUOUS  200-3,000 Units/hr Intravenous Continuous    LORazepam (Ativan) 2 mg/mL injection 0.5 mg  0.5 mg Intravenous Q6H PRN    dilTIAZem (cardIZEM) tab 90 mg  90 mg Oral 4 times per day    ergocalciferol (Vitamin D2) cap 50,000 Units  50,000 Units Oral Weekly    ipratropium-albuterol (Duoneb) 0.5-2.5 (3) MG/3ML inhalation solution 3 mL  3 mL Nebulization Q6H PRN    insulin aspart (NovoLOG) 100 Units/mL FlexPen 1-5 Units  1-5 Units Subcutaneous TID CC    LORazepam (Ativan) 2 mg/mL injection 0.5 mg  0.5 mg Intravenous Daily PRN    dilTIAZem 10 mg BOLUS FROM BAG infusion  10 mg Intravenous Q1H PRN    dilTIAZem (cardIZEM) 100 mg in sodium chloride 0.9% 100 mL IVPB-ADDV  2.5-20 mg/hr Intravenous Continuous    metoprolol tartrate (Lopressor) tab 25 mg  25 mg Oral 2x Daily(Beta Blocker)    acetaminophen (Tylenol) tab 650 mg  650 mg Oral Q4H PRN    ondansetron (Zofran) 4 MG/2ML injection 4 mg  4 mg Intravenous Q6H PRN    metoclopramide (Reglan) 5 mg/mL injection 5 mg  5 mg  Intravenous Q8H PRN    glucose (Dex4) 15 GM/59ML oral liquid 15 g  15 g Oral Q15 Min PRN    Or    glucose (Glutose) 40% oral gel 15 g  15 g Oral Q15 Min PRN    Or    glucose-vitamin C (Dex-4) chewable tab 4 tablet  4 tablet Oral Q15 Min PRN    Or    dextrose 50% injection 50 mL  50 mL Intravenous Q15 Min PRN    Or    glucose (Dex4) 15 GM/59ML oral liquid 30 g  30 g Oral Q15 Min PRN    Or    glucose (Glutose) 40% oral gel 30 g  30 g Oral Q15 Min PRN    Or    glucose-vitamin C (Dex-4) chewable tab 8 tablet  8 tablet Oral Q15 Min PRN   [6]   Medications Prior to Admission   Medication Sig    apixaban 5 MG Oral Tab Take 1 tablet (5 mg total) by mouth 2 (two) times daily.    diazePAM 2 MG Oral Tab Take 1 tablet (2 mg total) by mouth daily.    diazePAM 5 MG Oral Tab Take 1 tablet (5 mg total) by mouth at bedtime.    dilTIAZem 90 MG Oral Tab Take 2 tablets (180 mg total) by mouth 2 (two) times daily. HOLD if blood pressure <100 and heart rate <60    lisinopril 30 MG Oral Tab Take 1 tablet (30 mg total) by mouth before bedtime.    lisinopril 40 MG Oral Tab Take 1 tablet (40 mg total) by mouth in the morning.    metFORMIN 500 MG Oral Tab Take 1 tablet (500 mg total) by mouth 2 (two) times daily with meals.    metoprolol succinate ER 50 MG Oral Tablet 24 Hr Take 1 tablet (50 mg total) by mouth in the morning.    ergocalciferol 1.25 MG (51279 UT) Oral Cap Take 1 capsule (50,000 Units total) by mouth once a week. Taking dose on Fridays   [7]   Allergies  Allergen Reactions    Lisinopril ANGIOEDEMA     Per Dr. Fonseca, continue to hold Lisinopril as Pt is an unreliable historian & Pt experiences cardiopulmonary arrest in which angioedema from Lisinopril is suspected;

## 2025-06-03 NOTE — SLP NOTE
SPEECH DAILY NOTE - INPATIENT    ASSESSMENT & PLAN   ASSESSMENT      Proper PPE worn. Hands sanitized upon entrance/exit Pt room.         Pt alert, afebrile and on room air. Pt seen for swallow analysis per RE-BSE recommendations (after consulting with RN). Pt agreed to participate and continually requesting water from various staff. Pt's preferred method of learning verbal. Pt upright in bed; observed with current diet of pureed and moderately-thick liquids for monitoring diet tolerance. Additionally, Pt seen with mildly-thick and thin liquid trials for candidacy of diet upgrade. Swallowing precautions/strategies discussed as SLP fed Pt oral trials. Slow lingual formation on pureed consistency. Minimal oral retention cleared with SLP directed liquid wash. Pharyngeal response appeared delayed per hyolaryngeal elevation to completion (functional rise/strength per palpation). No overt CSA on pureed/moderately-thick nor mildly-thick liquids. Aggressive continuous coughing on thin liquids via controlled open cup. Diet to be UPGRADED to pureed/MILDLY-THICK liquids/no straw/pills whole in pureed. Collaborated with RN  and Dr. Pa regarding Pt's swallowing plan of care. Per RN, Pt with good tolerance of current diet. No report of difficulty taking meds. No new CXR completed. Sp02 ~94% during this session. Call light within Pt's reach upon SLP discharge from room.          CXR 5/31/25:  CONCLUSION:   New left basilar opacities, pneumonia versus aspiration.   Persistent mild pulmonary edema.             PLAN:    To f/u x2-3 sessions to ensure safe intake of NEW UPGRADED diet and reinforce swallowing/aspiration precautions. To monitor for new CXR results. Diet continue to be upgraded as tolerated. VFSS IF appropriate. Family education as available.             Diet Recommendations - Solids: Puree  Diet Recommendations - Liquids: Nectar thick liquids/ Mildly thick    Compensatory Strategies Recommended:  (no  straw)  Aspiration Precautions: Upright position, Slow rate, Small bites, Small sips  Medication Administration Recommendations: Whole in puree    Patient Experiencing Pain: No  Treatment Plan  Treatment Plan/Recommendations: Aspiration precautions  Interdisciplinary Communication: Discussed with RN  Plan posted at bedside      GOALS  Goal #1 The patient will tolerate puree consistency and moderately thick liquids without overt signs or symptoms of aspiration with 100 % accuracy over 1-2 session(s).    No CSA on current diet.       GOAL MET 6/03/25      Goal #2 The patient/family/caregiver will demonstrate understanding and implementation of aspiration precautions and swallow strategies independently over 1-2 session(s).    Swallowing precautions posted in room.     In Progress   Goal #3 The patient will tolerate trial upgrade of soft solids consistency and mildly thick/thin liquids without overt signs or symptoms of aspiration with 100 % accuracy over 1-2 session(s).    UPGRADED TO MILDLY-thick liquids. To f/u for safe tolerance; possible continued upgrade.    In Progress   Goal #4 The patient will utilize compensatory strategies as outlined by  BSSE (clinical evaluation) including Slow rate, Small bites, Small sips, Multiple swallows, Alternate liquids/solids, No straws, Upright 90 degrees, Upright 90 degrees 30 mins after meal, Liquids via tsp amount only, Eliminate distractions, Feed patient with 1:1 assistance 100 % of the time across 2 sessions.    SLP fed Pt; strategies executed.     In Progress   Follow Up Needed (Documentation Required): Yes  SLP Follow-up Date: 06/04/25  Duration: 1 week    Session: 1 S/P RE-BSE    If you have any questions, please contact   Azul Andrea M.S. [facility]/SLP  Speech-Language Pathologist  [facility]  #[phone]

## 2025-06-03 NOTE — PLAN OF CARE
RA. Afebrile. Patient remains confused. Patient constantly and aggressively asking for thin water. Repeat speech eval today, patient still coughing aggressively with thin liquids. RN educated pt on why he needs to be on thickened liquids. On IV heparin,Cardizem and Fluids. Call light within reach. Safety precautions in place. PT/OT session today. Plan: [facility] pending medical clearance.     Problem: Diabetes/Glucose Control  Goal: Glucose maintained within prescribed range  Description: INTERVENTIONS:  - Monitor Blood Glucose as ordered  - Assess for signs and symptoms of hyperglycemia and hypoglycemia  - Administer ordered medications to maintain glucose within target range  - Assess barriers to adequate nutritional intake and initiate nutrition consult as needed  - Instruct patient on self management of diabetes  Outcome: Progressing     Problem: CARDIOVASCULAR - ADULT  Goal: Maintains optimal cardiac output and hemodynamic stability  Description: INTERVENTIONS:  - Monitor vital signs, rhythm, and trends  - Monitor for bleeding, hypotension and signs of decreased cardiac output  - Evaluate effectiveness of vasoactive medications to optimize hemodynamic stability  - Monitor arterial and/or venous puncture sites for bleeding and/or hematoma  - Assess quality of pulses, skin color and temperature  - Assess for signs of decreased coronary artery perfusion - ex. Angina  - Evaluate fluid balance, assess for edema, trend weights  Outcome: Progressing  Goal: Absence of cardiac arrhythmias or at baseline  Description: INTERVENTIONS:  - Continuous cardiac monitoring, monitor vital signs, obtain 12 lead EKG if indicated  - Evaluate effectiveness of antiarrhythmic and heart rate control medications as ordered  - Initiate emergency measures for life threatening arrhythmias  - Monitor electrolytes and administer replacement therapy as ordered  Outcome: Progressing     Problem: GASTROINTESTINAL - ADULT  Goal:  Maintains or returns to baseline bowel function  Description: INTERVENTIONS:  - Assess bowel function  - Maintain adequate hydration with IV or PO as ordered and tolerated  - Evaluate effectiveness of GI medications  - Encourage mobilization and activity  - Obtain nutritional consult as needed  - Establish a toileting routine/schedule  - Consider collaborating with pharmacy to review patient's medication profile  Outcome: Progressing     Problem: HEMATOLOGIC - ADULT  Goal: Maintains hematologic stability  Description: INTERVENTIONS  - Assess for signs and symptoms of bleeding or hemorrhage  - Monitor labs and vital signs for trends  - Administer supportive blood products/factors, fluids and medications as ordered and appropriate  - Administer supportive blood products/factors as ordered and appropriate  Outcome: Progressing  Goal: Free from bleeding injury  Description: (Example usage: patient with low platelets)  INTERVENTIONS:  - Avoid intramuscular injections, enemas and rectal medication administration  - Ensure safe mobilization of patient  - Hold pressure on venipuncture sites to achieve adequate hemostasis  - Assess for signs and symptoms of internal bleeding  - Monitor lab trends  - Patient is to report abnormal signs of bleeding to staff  - Avoid use of toothpicks and dental floss  - Use electric shaver for shaving  - Use soft bristle tooth brush  - Limit straining and forceful nose blowing  Outcome: Progressing     Problem: Patient Centered Care  Goal: Patient preferences are identified and integrated in the patient's plan of care  Description: Interventions:  - Provide timely, complete, and accurate information to patient/family  - Incorporate patient and family knowledge, values, beliefs, and cultural backgrounds into the planning and delivery of care  - Encourage patient/family to participate in care and decision-making at the level they choose  - Honor patient and family perspectives and  choices  Outcome: Progressing  Problem: Delirium  Goal: Minimize duration of delirium  Description: Interventions:  - Encourage use of hearing aids, eye glasses  - Promote highest level of mobility daily  - Provide frequent reorientation  - Promote wakefulness i.e. lights on, blinds open  - Promote sleep, encourage patient's normal rest cycle i.e. lights off, TV off, minimize noise and interruptions  - Encourage family to assist in orientation and promotion of home routines  Outcome: Progressing     Problem: RESPIRATORY - ADULT  Goal: Achieves optimal ventilation and oxygenation  Description: INTERVENTIONS:  - Assess for changes in respiratory status  - Assess for changes in mentation and behavior  - Position to facilitate oxygenation and minimize respiratory effort  - Oxygen supplementation based on oxygen saturation or ABGs  - Provide Smoking Cessation handout, if applicable  - Encourage broncho-pulmonary hygiene including cough, deep breathe, Incentive Spirometry  - Assess the need for suctioning and perform as needed  - Assess and instruct to report SOB or any respiratory difficulty  - Respiratory Therapy support as indicated  - Manage/alleviate anxiety  - Monitor for signs/symptoms of CO2 retention  Outcome: Progressing     Problem: GENITOURINARY - ADULT  Goal: Absence of urinary retention  Description: INTERVENTIONS:  - Assess patient’s ability to void and empty bladder  - Monitor intake/output and perform bladder scan as needed  - Follow urinary retention protocol/standard of care  - Consider collaborating with pharmacy to review patient's medication profile  - Implement strategies to promote bladder emptying  Outcome: Progressing     Problem: METABOLIC/FLUID AND ELECTROLYTES - ADULT  Goal: Glucose maintained within prescribed range  Description: INTERVENTIONS:  - Monitor Blood Glucose as ordered  - Assess for signs and symptoms of hyperglycemia and hypoglycemia  - Administer ordered medications to maintain  glucose within target range  - Assess barriers to adequate nutritional intake and initiate nutrition consult as needed  - Instruct patient on self management of diabetes  Outcome: Progressing  Goal: Electrolytes maintained within normal limits  Description: INTERVENTIONS:  - Monitor labs and rhythm and assess patient for signs and symptoms of electrolyte imbalances  - Administer electrolyte replacement as ordered  - Monitor response to electrolyte replacements, including rhythm and repeat lab results as appropriate  - Fluid restriction as ordered  - Instruct patient on fluid and nutrition restrictions as appropriate  Outcome: Progressing  Goal: Hemodynamic stability and optimal renal function maintained  Description: INTERVENTIONS:  - Monitor labs and assess for signs and symptoms of volume excess or deficit  - Monitor intake, output and patient weight  - Monitor urine specific gravity, serum osmolarity and serum sodium as indicated or ordered  - Monitor response to interventions for patient's volume status, including labs, urine output, blood pressure (other measures as available)  - Encourage oral intake as appropriate  - Instruct patient on fluid and nutrition restrictions as appropriate  Outcome: Progressing     Problem: SKIN/TISSUE INTEGRITY - ADULT  Goal: Skin integrity remains intact  Description: INTERVENTIONS  - Assess and document risk factors for pressure ulcer development  - Assess and document skin integrity  - Monitor for areas of redness and/or skin breakdown  - Initiate interventions, skin care algorithm/standards of care as needed  Outcome: Progressing     Problem: MUSCULOSKELETAL - ADULT  Goal: Return mobility to safest level of function  Description: INTERVENTIONS:  - Assess patient stability and activity tolerance for standing, transferring and ambulating w/ or w/o assistive devices  - Assist with transfers and ambulation using safe patient handling equipment as needed  - Ensure adequate  protection for wounds/incisions during mobilization  - Obtain PT/OT consults as needed  - Advance activity as appropriate  - Communicate ordered activity level and limitations with patient/family  Outcome: Progressing     Problem: NEUROLOGICAL - ADULT  Goal: Achieves stable or improved neurological status  Description: INTERVENTIONS  - Assess for and report changes in neurological status  - Initiate measures to prevent increased intracranial pressure  - Maintain blood pressure and fluid volume within ordered parameters to optimize cerebral perfusion and minimize risk of hemorrhage  - Monitor temperature, glucose, and sodium. Initiate appropriate interventions as ordered  Outcome: Progressing

## 2025-06-03 NOTE — PLAN OF CARE
Problem: CARDIOVASCULAR - ADULT  Goal: Maintains optimal cardiac output and hemodynamic stability  Description: INTERVENTIONS:  - Monitor vital signs, rhythm, and trends  - Monitor for bleeding, hypotension and signs of decreased cardiac output  - Evaluate effectiveness of vasoactive medications to optimize hemodynamic stability  - Monitor arterial and/or venous puncture sites for bleeding and/or hematoma  - Assess quality of pulses, skin color and temperature  - Assess for signs of decreased coronary artery perfusion - ex. Angina  - Evaluate fluid balance, assess for edema, trend weights  Outcome: Progressing  Goal: Absence of cardiac arrhythmias or at baseline  Description: INTERVENTIONS:  - Continuous cardiac monitoring, monitor vital signs, obtain 12 lead EKG if indicated  - Evaluate effectiveness of antiarrhythmic and heart rate control medications as ordered  - Initiate emergency measures for life threatening arrhythmias  - Monitor electrolytes and administer replacement therapy as ordered  Outcome: Progressing     Problem: GASTROINTESTINAL - ADULT  Goal: Maintains or returns to baseline bowel function  Description: INTERVENTIONS:  - Assess bowel function  - Maintain adequate hydration with IV or PO as ordered and tolerated  - Evaluate effectiveness of GI medications  - Encourage mobilization and activity  - Obtain nutritional consult as needed  - Establish a toileting routine/schedule  - Consider collaborating with pharmacy to review patient's medication profile  Outcome: Progressing     Problem: HEMATOLOGIC - ADULT  Goal: Maintains hematologic stability  Description: INTERVENTIONS  - Assess for signs and symptoms of bleeding or hemorrhage  - Monitor labs and vital signs for trends  - Administer supportive blood products/factors, fluids and medications as ordered and appropriate  - Administer supportive blood products/factors as ordered and appropriate  Outcome: Progressing  Goal: Free from bleeding  injury  Description: (Example usage: patient with low platelets)  INTERVENTIONS:  - Avoid intramuscular injections, enemas and rectal medication administration  - Ensure safe mobilization of patient  - Hold pressure on venipuncture sites to achieve adequate hemostasis  - Assess for signs and symptoms of internal bleeding  - Monitor lab trends  - Patient is to report abnormal signs of bleeding to staff  - Avoid use of toothpicks and dental floss  - Use electric shaver for shaving  - Use soft bristle tooth brush  - Limit straining and forceful nose blowing  Outcome: Progressing     Problem: RESPIRATORY - ADULT  Goal: Achieves optimal ventilation and oxygenation  Description: INTERVENTIONS:  - Assess for changes in respiratory status  - Assess for changes in mentation and behavior  - Position to facilitate oxygenation and minimize respiratory effort  - Oxygen supplementation based on oxygen saturation or ABGs  - Provide Smoking Cessation handout, if applicable  - Encourage broncho-pulmonary hygiene including cough, deep breathe, Incentive Spirometry  - Assess the need for suctioning and perform as needed  - Assess and instruct to report SOB or any respiratory difficulty  - Respiratory Therapy support as indicated  - Manage/alleviate anxiety  - Monitor for signs/symptoms of CO2 retention  Outcome: Progressing     Problem: GENITOURINARY - ADULT  Goal: Absence of urinary retention  Description: INTERVENTIONS:  - Assess patient’s ability to void and empty bladder  - Monitor intake/output and perform bladder scan as needed  - Follow urinary retention protocol/standard of care  - Consider collaborating with pharmacy to review patient's medication profile  - Implement strategies to promote bladder emptying  Outcome: Progressing

## 2025-06-03 NOTE — PROGRESS NOTES
[facility] Hospitalist Progress Note     CC: Hospital Follow up    PCP: Wyatt Ross DO       Assessment/Plan:     Principal Problem:    Acute gastrointestinal hemorrhage  Active Problems:    Tremor    Mixed Alzheimer and vascular dementia (HCC)    Generalized anxiety disorder    Alcohol abuse, continuous    Episodic mood disorder    Delirium due to another medical condition    Patient is a 79-year-old male with history of Dementia, CVA, Atrial Fibrillation on Eliquis, Hypertension, type 2 Diabetes, CKD, who presents to the hospital for evaluation of rectal bleeding.  S/p EGD/C-scope 5/23/25.  Hospital course complicated by CODE BLUE was called for increased secretions with tongue swelling and bradycardia which led to asystolic arrest.  CPR was initiated and patient was intubated.  Likely cause was thought to be due to angioedema from lisinopril.  Extubated 5/25/25.  How with aspiration and Delirium with in multifactorial.     Presumed aspiration pneumonia over weekend   - Congestion improved  - Unasyn changed to ceftriaxone over concerns of possible urinary tract infection  - As patient clinically has improved on ceftriaxone will continue  - Lungs are now clear patient alert and oriented x 2-3   - Bedside swallow with modified diet   - Now on room air    Acute respiratory failure secondary to angioedema resolved   - Thought to be related to ACE inhibitor which is a home medication, I do not see that any was given while in the hospital  - Tongue swelling and secretions are now resolved  - Extubated as of 5/25 and currently on room air  - Completed steroids and Benadryl    Tremors , thought to be related to benzodiazepine withdrawal take scheduled Valium at home  - Unclear why he does drink alcohol frequently as well  - seen by psych   - medications adjusted   - needs neuropsych eval as outpatient     Rectal bleeding   Acute blood loss anemia  - Hemoglobin now stable  - S/p EGD/C-scope 5/23/25 Colonoscopy  with diverticulosis and large pedunculated polyp in the rectosigmoid region that was fully removed with hot cautery snare, bleeding was felt to be more likely diverticular without culprit lesion identified   - PPI IV   - if further bleeding may need IR involvement   - serial Hemoglobin monitoring has been stable   - Eliquis resumed 5/29/25 -hemoglobin has been improving  - heparin drip for NPO      Atrial fibrillation chronic  - As patient had been n.p.o. dilt drip was restarted at 10 if able to take oral can resume oral medications   - Chronically takes diltiazem 180 mg twice daily at home and Toprol 50 XL daily  - Inpatient regiment was 90 mg every 6  - Previous oral dose metoprolol tartrate 25 twice daily  - Also on diltiazem drip at 5  - Cardiology following  - Echo with EF 55-60%  - resume Eliquis 5/29/25 -now on heparin drip due to n.p.o. status cardiology following  - iv lasix as needed- ordered 1 time dose 5/31  - Lung congestion is improved  - Has been on gentle fluids due to prolonged n.p.o. continue saline at 30  DM  - A1c 5.9, ISS used while on steroids      ZOE on CKD  -Resolved    Hypernatremia revolved, now Na rending up after NPO  - nephrology consulted   - D5W  - trend bnp     Dementia-chronic could be Alzheimer's type versus alcohol induced  - resides at the [facility] [place]-independent per report  - TSH, b12 wnl   - was in the process of moving [facility] in [place] prior to admission   Plan to go to subacute rehab then to memory care in [place]  - Guardian Juanito requesting NANCY before this transition   - PT OT eval ongoing     Fluids: none  Diet: modified   DVT prophylaxis: Eliquis      Dispo: cardiac tele, once medically stable can go to subacute rehab     Code status: Full code    Questions/concerns were discussed with patient and/or family by bedside.      Thank You,  Edward Pa MD     Hospitalist with [facility]     Subjective:     Wants to drink  water but has not been tolerating thin liquids.   Tolerating modified diet this AM.     OBJECTIVE:    Blood pressure 141/74, pulse 120, temperature 98.1 °F (36.7 °C), temperature source Oral, resp. rate 20, height 5' 9\" (1.753 m), weight 152 lb 12.5 oz (69.3 kg), SpO2 95%.    Temp:  [97.4 °F (36.3 °C)-99.1 °F (37.3 °C)] 98.1 °F (36.7 °C)  Pulse:  [] 120  Resp:  [18-24] 20  BP: (141-152)/(74-95) 141/74  SpO2:  [92 %-95 %] 95 %      Intake/Output:    Intake/Output Summary (Last 24 hours) at 6/3/2025 1126  Last data filed at 6/3/2025 1112  Gross per 24 hour   Intake 1836.5 ml   Output 1000 ml   Net 836.5 ml       Last 3 Weights   05/31/25 0518 152 lb 12.5 oz (69.3 kg)   05/28/25 0500 154 lb 5.2 oz (70 kg)   05/27/25 0400 156 lb 8.4 oz (71 kg)   05/25/25 0200 151 lb 14.4 oz (68.9 kg)   05/22/25 2238 157 lb 6.5 oz (71.4 kg)   05/22/25 1426 180 lb (81.6 kg)   10/08/24 1853 180 lb (81.6 kg)   12/18/19 0857 188 lb (85.3 kg)       Exam   Gen: No acute distress, alert and oriented x 2  Pulm: Good air entry no wheezes or congestion today  CV: Heart with regular rate and rhythm  Abd: Abdomen soft, non-tender  MSK: no clubbing, no cyanosis    Data Review:       Labs:     Recent Labs   Lab 05/31/25  0646 06/01/25  0130 06/01/25  1041 06/02/25  0600 06/03/25  0542   RBC 3.34*   < > 3.19* 3.08* 3.30*   HGB 8.5*   < > 8.2* 7.6*  7.7* 8.5*   HCT 27.9*   < > 26.7* 25.2*  25.6* 27.8*   MCV 83.5   < > 83.7 83.1 84.2   MCH 25.4*   < > 25.7* 25.0* 25.8*   MCHC 30.5*   < > 30.7* 30.1* 30.6*   RDW 16.8*   < > 16.9* 17.2* 17.4*   NEPRELIM 13.37*  --   --   --   --    WBC 15.5*   < > 13.6* 13.7* 11.9*   .0   < > 463.0* 520.0* 539.0*    < > = values in this interval not displayed.         Recent Labs   Lab 05/31/25  0449 06/01/25  0130 06/01/25  1041 06/02/25  0600 06/03/25  0542   *  --  109*  --  132*   BUN 26*  --  26*  --  29*   CREATSERUM 1.30 1.52* 1.48*  --  1.54*   EGFRCR 57* 47* 48*  --  46*   CA 8.1*  --  8.1*   --  8.1*     --  147*  --  152*   K 3.3* 3.4* 3.6 3.6 3.4*     --  114*  --  120*   CO2 25.0  --  23.0  --  23.0       Recent Labs   Lab 05/28/25  0405 05/29/25  0425 05/30/25  0412   ALT 28 43 46   AST 24 42* 39*   ALB 3.5 3.3 3.4         Imaging:  No results found.        Meds:     Scheduled Medications[1]  Medication Infusions[2]  PRN Medications[3]           [1]    potassium chloride  40 mEq Intravenous Once    LORazepam  0.5 mg Intravenous TID    mirtazapine  15 mg Oral Nightly    thiamine  100 mg Intravenous Daily    cefTRIAXone  2 g Intravenous Q24H    dilTIAZem  90 mg Oral 4 times per day    ergocalciferol  50,000 Units Oral Weekly    insulin aspart  1-5 Units Subcutaneous TID CC    metoprolol tartrate  25 mg Oral 2x Daily(Beta Blocker)   [2]    dextrose 75 mL/hr at 06/03/25 0906    continuous dose heparin 1,200 Units/hr (06/03/25 0719)    dilTIAZem 15 mg/hr (06/03/25 0905)   [3]   LORazepam    ipratropium-albuterol    LORazepam    dilTIAZem    acetaminophen    ondansetron    metoclopramide    glucose **OR** glucose **OR** glucose-vitamin C **OR** dextrose **OR** glucose **OR** glucose **OR** glucose-vitamin C

## 2025-06-03 NOTE — PROGRESS NOTES
Patient is a 79 year old   male with history of HTN, DM2, A-fib, CVA and history of dementia who presented to the hospital from [facility] for recent fall and found with low hemoglobin of 6.4.  Patient admitted to the PCU.  Patient has been demonstrating confusion and agitation.  Patient started on CIWA protocol and psych consult requested for evaluation and advice.  Consult Duration     The patient seen for over 50-minute, follow-up evaluation, over 50% counseling and coordinating care addressing confusion, agitation.    Record reviewed, communication with attending, communication with RN and patient seen face to face evaluation.    History of Present Illness:     Communicating with the team, patient continues to demonstrate alternation in moo and cognition. Patient somewhat lethargic today otherwise aaox3 this morning.     Vital signs: Bp 139/82   Patient not able to receive valium 2 mg BID, Remeron 7.5 mg for the last 2 days due to n.p.o.,     The patient seen today in his room.    Patient was anxious and restless.    He otherwise is not in restraint.     The patient found tremorous and very restless and anxious.  He demonstrates some confusion otherwise upon starting asking orientation question he kept very irritable as an acknowledgment that he has been having the same question repeatedly through the day.    Patient continues treating alternation in his mood with increased anxiety, restlessness, difficulty sleeping, confusion and appear in withdrawal and.    The patient denying any suicidal or homicidal ideation.  Patient denied any auditory visual hallucination otherwise demonstrate response to internal stimuli.    According to the medical team the patient presumed to have aspiration pneumonia over the weekend  Patient is NPO.     The patient who has been demonstrating delirious episode has been demonstrating reasonable improvement gradually.  Patient definitely  with anxiety and will benefit from alternative approach of treatment.      Past Psychiatric/Medication History:  1. Prior diagnoses: Generalized anxiety disorder.  2. Past psychiatric inpatient: No psych admission reported  3. Past outpatient history: PCP  4. Past suicide history: Denied any  5. Medication history: Diazepam    Social History:   Patient is a  male lives in long-term home.  Patient's POA is Clem Naidu.  Patient with history of CVA.  Questionable diagnosis of dementia.  Patient denying history of substance abuse otherwise admitted to drinking socially with recent excessive drinking.    Family History:  Patient denies psychiatric family history  Medical History:   Past Medical History  Past Medical History[1]    Past Surgical History  Past Surgical History[2]    Family History  Family History[3]    Social History  Short Social Hx on File[4]        Current Medications:  Current Hospital Medications[5]  Prescriptions Prior to Admission[6]    Allergies  Allergies[7]    Review of Systems:   As by Admitting/Attending    Results:   Laboratory Data:  Lab Results   Component Value Date    WBC 13.7 (H) 06/02/2025    HGB 7.6 (L) 06/02/2025    HGB 7.7 (L) 06/02/2025    HCT 25.2 (L) 06/02/2025    HCT 25.6 (L) 06/02/2025    .0 (H) 06/02/2025    CREATSERUM 1.48 (H) 06/01/2025    BUN 26 (H) 06/01/2025     (H) 06/01/2025    K 3.6 06/02/2025     (H) 06/01/2025    CO2 23.0 06/01/2025     (H) 06/01/2025    CA 8.1 (L) 06/01/2025    ALB 3.4 05/30/2025    ALKPHO 64 05/30/2025    TP 6.4 05/30/2025    AST 39 (H) 05/30/2025    ALT 46 05/30/2025    PTT 44.9 (H) 06/02/2025    INR 1.47 (H) 05/23/2025    PTP 18.6 (H) 05/23/2025    T4F 0.8 05/29/2025    TSH 1.122 05/29/2025    MG 2.0 06/01/2025    PHOS 2.8 06/01/2025    B12 477 05/29/2025         Imaging:  XR CHEST AP PORTABLE  (CPT=71045)  Result Date: 5/31/2025  CONCLUSION:   New left basilar opacities, pneumonia versus aspiration.  Persistent  mild pulmonary edema.    elm-remote      Dictated by (CST): Jose Roberto Lebron MD on 5/31/2025 at 8:21 AM     Finalized by (CST): Jose Roberto Lebron MD on 5/31/2025 at 8:23 AM            Vital Signs:   Blood pressure 147/79, pulse 106, temperature 97.4 °F (36.3 °C), temperature source Oral, resp. rate 18, height 69\", weight 69.3 kg (152 lb 12.5 oz), SpO2 92%.    Mental Status Exam:   Appearance: Stated age male, in hospital gown, laying down in hospital bed.  Psychomotor: Patient seen today very tremorous all over his body and very restless and irritable  Orientation: Alert and oriented to person, place and date.  Patient by report has been confused  Gait: Not evaluated.  Attitude/Coorperation: Patient presented inattentive, restless and alternation in his presentation.  Behavior: episodes of restlessness and agitation.  Otherwise improvement today  Speech: Regular rate and rhythm speech.  Mood: Patient admitted that he has been feeling anxious.  Affect: Anxious affect congruent with mood.  Thought process: Confused has been reported otherwise the patient demonstrated some improvement today  Thought content: No reports of  suicidal or homicidal ideation.  Perceptions: Patient has been demonstrating response to internal stimuli.  Concentration: Grossly distracted  Memory: Grossly impaired with some improvement  Intellect: Average.  Judgment and Insight: Questionable.     Impression:     Episodic mood disorder.  Delirium due to another medical condition.  Generalized anxiety disorder.  Benzodiazepine withdrawal syndrome.  Acute gastrointestinal hemorrhage  Tremor  Mixed Alzheimer and vascular dementia (HCC)      The patient is a 79-year-old   male with history of A-fib, HTN, DM2 and history of CVA who presented to the hospital with fall and bloody bowel movement.  Patient found with hemoglobin of 6.4.  Patient during this admission started to demonstrate alternation in his mood, episode of confusion, restlessness,  agitation hallucinating.  Patient started on CIWA after he mentioning wine.    The patient seen today in his room and the patient demonstrating improvement in his cognitive function considering his condition previously.  Patient admitting history of anxiety.  Patient admitting some sign of depression.  Patient endorsing the fact that he have history of social drinking with recent excessive drinking on memorial day and recent fall with abdominal pain.    The patient has been demonstrating delirium episode superimposed on anxiety, depression and alcohol abuse with some improvement.    5/31/2025: patient continues to demonstrate alternation in mood and cognition. Patient aaox3 this morning according to RN. Patient otherwise anxious and restless and refusing to answer questions stating he needs water. Patient NPO.     6/2/2025: The patient continued demonstrating alternation in his mood, cognition, restlessness, tremulousness, tachycardic with slight fluctuation in the blood pressure.    Discussed risk and benefit, acknowledging the current symptom and severity.  At this point, I would recommend the following approach:     Focus on safety  Focus on education and support.  Focus on insight orientation helping the patient understand diagnosis and treatment plan.  Discontinue diazepam to 2 mg twice daily.  Discontinue Remeron 7.5 mg nightly.  Start lorazepam 0.5 mg IV 3 times daily.  Start mirtazapine disintegrated tablet 50 mg nightly.  Change thiamine 200 mg IV daily.  Utilize ativan 0.5 mg IV q 6 hours PRN  The patient would benefit from neuropsych testing a month from today considering improvement in his physical and cognitive function and returning to his baseline.  Processed with patient at length, the initiation of the above psychotropic medications I advised the patient of the risks, benefits, alternatives and potential side effects. The patient consents to administration of the medications and understands the  right to refuse medications at any time. The patient verbalized understanding.   Coordinate plan with team    Orders This Visit:  Orders Placed This Encounter   Procedures    CBC With Differential With Platelet    Comp Metabolic Panel (14)    Hemoglobin    Prothrombin Time (PT)    PTT, Activated    Comp Metabolic Panel (14)    CBC With Differential With Platelet    Prothrombin Time (PT)    Hemoglobin & Hematocrit    Basic Metabolic Panel (8)    Comp Metabolic Panel (14)    Arterial blood gas    Hemoglobin    CBC With Differential With Platelet    Comp Metabolic Panel (14)    Expanded Arterial Blood Gas    CBC With Differential With Platelet    Triglycerides    Hemoglobin A1C    CBC With Differential With Platelet    Basic Metabolic Panel (8)    CBC With Differential With Platelet    Basic Metabolic Panel (8)    Basic Metabolic Panel (8)    TSH and Free T4    Vitamin B12    Magnesium    Potassium    Magnesium    Hemoglobin & Hematocrit    Basic Metabolic Panel (8)    CBC With Differential With Platelet    Arterial blood gas    Potassium    CBC, Platelet; No Differential    Urinalysis with Culture Reflex    Magnesium    Phosphorus    Creatinine, Serum    Potassium    Basic Metabolic Panel (8)    CBC, Platelet; No Differential    Platelet Count    PTT, Activated    PTT, Activated    PTT, Activated    PTT, Activated    Basic Metabolic Panel (8)    Basic Metabolic Panel (8)    PTT, Activated    Type and screen    ABORH (Blood Type)    Antibody Screen    ABORH Confirmation    Prepare RBC Once    Prepare RBC Once    Prepare RBC STAT    Specimen to Pathology Tissue    MRSA Screen by PCR    Blood Culture    Urine Culture, Routine       Meds This Visit:  Requested Prescriptions      No prescriptions requested or ordered in this encounter     Nicola Stone MD  6/2/2025    Note to Patient: The 21st Century Cures Act makes medical notes like these available to patients in the interest of transparency. However, be advised this  is a medical document. It is intended as peer to peer communication. It is written in medical language and may contain abbreviations or verbiage that are unfamiliar. It may appear blunt or direct. Medical documents are intended to carry relevant information, facts as evident, and the clinical opinion of the practitioner. This note may have been transcribed using a voice dictation system. Voice recognition errors may occur. This should not be taken to alter the content or meaning of this note.            [1]   Past Medical History:   A-fib (HCC)    CVA (cerebral vascular accident) (HCC)    Diabetes (HCC)    Diverticulosis of large intestine    Scattered pandiverticulosis   [2]   Past Surgical History:  Procedure Laterality Date    Colonoscopy N/A 05/22/2025    EGD/COLONOSCOPY;  Surgeon: Cesar Byrne MD;  Location: [facility] ENDOSCOPY   [3] No family history on file.  [4]   Social History  Socioeconomic History    Marital status:    Tobacco Use    Smoking status: Former     Types: Cigarettes     Passive exposure: Never    Smokeless tobacco: Never   Vaping Use    Vaping status: Never Used   Substance and Sexual Activity    Alcohol use: Yes     Alcohol/week: 6.0 - 7.0 standard drinks of alcohol     Types: 3 - 4 Standard drinks or equivalent, 3 Cans of beer per week     Social Drivers of Health     Food Insecurity: Patient Unable To Answer (5/28/2025)    NCSS - Food Insecurity     Worried About Running Out of Food in the Last Year: Patient unable to answer     Ran Out of Food in the Last Year: Patient unable to answer   Transportation Needs: Patient Unable To Answer (5/28/2025)    NCSS - Transportation     Lack of Transportation: Patient unable to answer   Housing Stability: Patient Unable To Answer (5/28/2025)    NCSS - Housing/Utilities     Has Housing: Patient unable to answer     Worried About Losing Housing: Patient unable to answer     Unable to Get Utilities: Patient unable to answer   [5]   Current  Facility-Administered Medications   Medication Dose Route Frequency    cefTRIAXone (Rocephin) 2 g in sodium chloride 0.9% 100 mL IVPB-ADDV  2 g Intravenous Q24H    sodium chloride 0.9% infusion   Intravenous Continuous    heparin (Porcine) 54017 units/250mL infusion ACS/AFIB CONTINUOUS  200-3,000 Units/hr Intravenous Continuous    LORazepam (Ativan) 2 mg/mL injection 0.5 mg  0.5 mg Intravenous Q6H PRN    [Held by provider] dilTIAZem (cardIZEM) tab 90 mg  90 mg Oral 4 times per day    thiamine (Vitamin B1) tab 100 mg  100 mg Oral Daily    diazePAM (Valium) tab 2 mg  2 mg Oral BID    mirtazapine (Remeron) tab 7.5 mg  7.5 mg Oral Nightly    ergocalciferol (Vitamin D2) cap 50,000 Units  50,000 Units Oral Weekly    ipratropium-albuterol (Duoneb) 0.5-2.5 (3) MG/3ML inhalation solution 3 mL  3 mL Nebulization Q6H PRN    insulin aspart (NovoLOG) 100 Units/mL FlexPen 1-5 Units  1-5 Units Subcutaneous TID CC    LORazepam (Ativan) 2 mg/mL injection 0.5 mg  0.5 mg Intravenous Daily PRN    dilTIAZem 10 mg BOLUS FROM BAG infusion  10 mg Intravenous Q1H PRN    dilTIAZem (cardIZEM) 100 mg in sodium chloride 0.9% 100 mL IVPB-ADDV  2.5-20 mg/hr Intravenous Continuous    pantoprazole (Protonix) 40 mg in sodium chloride 0.9% PF 10 mL IV push  40 mg Intravenous Daily    [Held by provider] metoprolol tartrate (Lopressor) tab 25 mg  25 mg Oral 2x Daily(Beta Blocker)    acetaminophen (Tylenol) tab 650 mg  650 mg Oral Q4H PRN    ondansetron (Zofran) 4 MG/2ML injection 4 mg  4 mg Intravenous Q6H PRN    metoclopramide (Reglan) 5 mg/mL injection 5 mg  5 mg Intravenous Q8H PRN    glucose (Dex4) 15 GM/59ML oral liquid 15 g  15 g Oral Q15 Min PRN    Or    glucose (Glutose) 40% oral gel 15 g  15 g Oral Q15 Min PRN    Or    glucose-vitamin C (Dex-4) chewable tab 4 tablet  4 tablet Oral Q15 Min PRN    Or    dextrose 50% injection 50 mL  50 mL Intravenous Q15 Min PRN    Or    glucose (Dex4) 15 GM/59ML oral liquid 30 g  30 g Oral Q15 Min PRN    Or     glucose (Glutose) 40% oral gel 30 g  30 g Oral Q15 Min PRN    Or    glucose-vitamin C (Dex-4) chewable tab 8 tablet  8 tablet Oral Q15 Min PRN   [6]   Medications Prior to Admission   Medication Sig    apixaban 5 MG Oral Tab Take 1 tablet (5 mg total) by mouth 2 (two) times daily.    diazePAM 2 MG Oral Tab Take 1 tablet (2 mg total) by mouth daily.    diazePAM 5 MG Oral Tab Take 1 tablet (5 mg total) by mouth at bedtime.    dilTIAZem 90 MG Oral Tab Take 2 tablets (180 mg total) by mouth 2 (two) times daily. HOLD if blood pressure <100 and heart rate <60    lisinopril 30 MG Oral Tab Take 1 tablet (30 mg total) by mouth before bedtime.    lisinopril 40 MG Oral Tab Take 1 tablet (40 mg total) by mouth in the morning.    metFORMIN 500 MG Oral Tab Take 1 tablet (500 mg total) by mouth 2 (two) times daily with meals.    metoprolol succinate ER 50 MG Oral Tablet 24 Hr Take 1 tablet (50 mg total) by mouth in the morning.    ergocalciferol 1.25 MG (85049 UT) Oral Cap Take 1 capsule (50,000 Units total) by mouth once a week. Taking dose on Fridays   [7]   Allergies  Allergen Reactions    Lisinopril ANGIOEDEMA     Per Dr. Fonseca, continue to hold Lisinopril as Pt is an unreliable historian & Pt experiences cardiopulmonary arrest in which angioedema from Lisinopril is suspected;

## 2025-06-03 NOTE — PHYSICAL THERAPY NOTE
PHYSICAL THERAPY RE-EVALUATION - INPATIENT     Room Number: 314/314-A  Evaluation Date: 6/3/2025  Type of Evaluation: Re-evaluation   Physician Order: PT Eval and Treat    Presenting Problem: acute GI hemorrhage, rectal bleeding, hypotention, s/p EGD and colonoscopy with hot cautery snare and clipse; re-evaluation s/p code blue on 5/24 after being intubated x 1 day  Co-Morbidities : dementia, stroke, atrial fibrillation on Eliquis, hypertension, type 2 diabetes  Reason for Therapy: Mobility Dysfunction and Discharge Planning    PHYSICAL THERAPY ASSESSMENT   Patient is a 79 year old male admitted 5/22/2025 for acute GI hemorrhage, rectal bleeding, hypotention, s/p EGD and colonoscopy with hot cautery snare and clipse; re-evaluation s/p code blue on 5/24 after being intubated x 1 day.  Prior to admission, patient's baseline is independent, ambulates without assistive device.  Patient is currently functioning below baseline with bed mobility, transfers, gait, and maintaining seated position.  Patient is requiring maximum assist x 2 as a result of the following impairments: decreased functional strength, decreased endurance/aerobic capacity, pain, impaired sitting balance, decreased muscular endurance, and medical status.  Physical Therapy will continue to follow for duration of hospitalization.    Patient will benefit from continued skilled PT Services to promote return to prior level of function and safety with continuous assistance and gradual rehabilitative therapy .    PLAN DURING HOSPITALIZATION  Nursing Mobility Recommendation : Lift Equipment  PT Device Recommendation: Mechanical lift  PT Treatment Plan: Bed mobility, Body mechanics, Energy conservation, Endurance, Patient education, Gait training, Strengthening, Stair training, Transfer training, Balance training  Rehab Potential : Fair  Frequency (Obs): 3-5x/week     PHYSICAL THERAPY MEDICAL/SOCIAL HISTORY     Problem List  Principal Problem:    Acute  gastrointestinal hemorrhage  Active Problems:    Tremor    Mixed Alzheimer and vascular dementia (HCC)    Generalized anxiety disorder    Alcohol abuse, continuous    Episodic mood disorder    Delirium due to another medical condition    Goals of care, counseling/discussion    Advance care planning    Palliative care by specialist      HOME SITUATION  Type of Home:  ([facility])  Home Layout: One level, Elevator  Drives: No   Patient Regularly Uses: Glasses     Prior Level of [place]: independent    SUBJECTIVE  \"I have a  injury on this shoulder\"     PHYSICAL THERAPY EXAMINATION   OBJECTIVE  Precautions: Bed/chair alarm, Limb alert - left  Fall Risk: High fall risk    WEIGHT BEARING RESTRICTION  none    PAIN ASSESSMENT  Rating:  (did not rate)  Location: L arm/shoulder  Management Techniques: Activity promotion, Body mechanics, Repositioning    COGNITION  Overall Cognitive Status:  Impaired A&O x 2, forgetful, easily agitated, needs repeated cues    RANGE OF MOTION AND STRENGTH ASSESSMENT  Upper extremity ROM and strength are within functional limits.  Lower extremity ROM is within functional limits.  Lower extremity strength is within functional limits.    BALANCE  Static Sitting: Poor -  Dynamic Sitting: Poor -  Static Standing: Not tested  Dynamic Standing: Not tested    ACTIVITY TOLERANCE  Pulse: (!) 124  Heart Rate Source: Monitor  BP: 125/86  BP Location: Right arm  BP Method: Automatic  Patient Position: Sitting    AM-PAC '6-Clicks' INPATIENT SHORT FORM - BASIC MOBILITY  How much difficulty does the patient currently have...  Patient Difficulty: Turning over in bed (including adjusting bedclothes, sheets and blankets)?: A Lot   Patient Difficulty: Sitting down on and standing up from a chair with arms (e.g., wheelchair, bedside commode, etc.): Unable   Patient Difficulty: Moving from lying on back to sitting on the side of the bed?: A Lot   How much help from another person does the patient  currently need...   Help from Another: Moving to and from a bed to a chair (including a wheelchair)?: Total   Help from Another: Need to walk in hospital room?: Total   Help from Another: Climbing 3-5 steps with a railing?: Total     AM-PAC Score:  Raw Score: 8   Approx Degree of Impairment: 86.62%   Standardized Score (AM-PAC Scale): 28.58   CMS Modifier (G-Code): CM    FUNCTIONAL ABILITY STATUS  Functional Mobility/Gait Assessment  Gait Assistance: Not tested  Supine to Sit: maximum assist x 2   Sit to Supine: maximum assist x 2     Exercise/Education Provided:  Education Provided To: Patient     Patient Education: Role of Physical Therapy, Plan of Care, Functional Transfer Techniques, Fall Prevention, Posture/Positioning, Energy Conservation, Proper Body Mechanics     Patient's Response to Education: Requires Further Education, Demonstrates Poor Carry Over to Information, Does Not Demonstrate Skills Needed for Learning     Skilled Therapy Provided: Pt received resting in bed and agreeable to activity. Perseverating on having drink of gatorade. Requires frequent repeated cues to redirect and stay on task. Pt very fearful of falling with movement. Required max A x 2 for supine>sit at EOB. Mod-max A for static sitting balance unsupported at EOB; pt unable to use LUE on bed rail due to L shoulder pain/impaired ROM. Noted to be significantly tremulous in BUE and trunk while sitting at EOB, significant posterior lean noted. Pt impulsively attempted to return to supine despite cues. Max A x 2 to complete sit>supine and scoot in bed. Pt was left resting in bed with needs within reach, alarm on, handoff to RN complete.     The patient's Approx Degree of Impairment: 86.62% has been calculated based on documentation in the [facility] '6 clicks' Inpatient Basic Mobility Short Form.  Research supports that patients with this level of impairment may benefit from LTAC however anticipate benefit from rehab facility.  Final  disposition will be made by interdisciplinary medical team.    Patient End of Session: In bed, Needs met, Call light within reach, RN aware of session/findings, All patient questions and concerns addressed, Hospital anti-slip socks, Alarm set    CURRENT GOALS  Goals to be met by: 6/10/25  Patient Goal Patient's self-stated goal is: none stated   Goal #1 Patient is able to demonstrate supine - sit EOB @ level: moderate assistance     Goal #1   Current Status    Goal #2 Patient is able to demonstrate transfers Sit to/from Stand at assistance level: moderate assistance with walker - rolling     Goal #2  Current Status    Goal #3 Patient is able to ambulate 15 feet with assist device: walker - rolling at assistance level: moderate assistance   Goal #3   Current Status    Goal #4    Goal #4   Current Status    Goal #5 Patient to demonstrate independence with home activity/exercise instructions provided to patient in preparation for discharge.   Goal #5   Current Status    Goal #6    Goal #6  Current Status      PT re-evaluation   Therapeutic Activity:  10 minutes

## 2025-06-03 NOTE — DISCHARGE INSTRUCTIONS
The patient would benefit from neuropsych testing a month from today considering improvement in his physical and cognitive function and returning to his baseline.     [facility]:  [facility]  [address], [place] [address]  Phone: [phone]  Fax: [phone]    Keep all follow up appointments and continue current medications.

## 2025-06-04 NOTE — PROGRESS NOTES
[facility] Hospitalist Progress Note     CC: Hospital Follow up    PCP: Wyatt Ross DO       Assessment/Plan:     Principal Problem:    Acute gastrointestinal hemorrhage  Active Problems:    Tremor    Mixed Alzheimer and vascular dementia (HCC)    Generalized anxiety disorder    Alcohol abuse, continuous    Episodic mood disorder    Delirium due to another medical condition    Goals of care, counseling/discussion    Advance care planning    Palliative care by specialist    Patient is a 79-year-old male with history of Dementia, CVA, Atrial Fibrillation on Eliquis, Hypertension, type 2 Diabetes, CKD, who presents to the hospital for evaluation of rectal bleeding.  S/p EGD/C-scope 5/23/25.  Hospital course complicated by CODE BLUE was called for increased secretions with tongue swelling and bradycardia which led to asystolic arrest.  CPR was initiated and patient was intubated.  Likely cause was thought to be due to angioedema from lisinopril.  Extubated 5/25/25.  How with aspiration and Delirium with in multifactorial.     Presumed aspiration pneumonia over weekend   - Congestion improved  - Unasyn changed to ceftriaxone over concerns of possible urinary tract infection  - As patient clinically has improved on ceftriaxone will continue  - Lungs are now clear patient alert and oriented x 2-3   - Bedside swallow with modified diet   - Now on room air    Acute respiratory failure secondary to angioedema resolved   - Thought to be related to ACE inhibitor which is a home medication, I do not see that any was given while in the hospital  - Tongue swelling and secretions are now resolved  - Extubated as of 5/25 and currently on room air  - Completed steroids and Benadryl    Tremors , thought to be related to benzodiazepine withdrawal take scheduled Valium at home  - Unclear why he does drink alcohol frequently as well  - seen by psych   - medications adjusted   - needs neuropsych eval as outpatient     Rectal  bleeding   Acute blood loss anemia  - Hemoglobin now stable  - S/p EGD/C-scope 5/23/25 Colonoscopy with diverticulosis and large pedunculated polyp in the rectosigmoid region that was fully removed with hot cautery snare, bleeding was felt to be more likely diverticular without culprit lesion identified   - PPI IV   - if further bleeding may need IR involvement   - serial Hemoglobin monitoring has been stable   - Eliquis resumed 5/29/25 -hemoglobin has been improving  - heparin drip for NPO      Atrial fibrillation chronic  - As patient had been n.p.o. dilt drip was restarted at 10 if able to take oral can resume oral medications   - Chronically takes diltiazem 180 mg twice daily at home and Toprol 50 XL daily  - Inpatient regiment was 90 mg every 6  - Previous oral dose metoprolol tartrate 25 twice daily  - Also on diltiazem drip at 5  - Cardiology following  - Echo with EF 55-60%  - resume Eliquis 5/29/25 -now on heparin drip due to n.p.o. status cardiology following  - iv lasix as needed- ordered 1 time dose 5/31  - Lung congestion is improved  - Has been on gentle fluids due to prolonged n.p.o. continue saline at 30  DM  - A1c 5.9, ISS used while on steroids      ZOE on CKD  -Resolved    Hypernatremia revolved, now Na rending up after NPO  - nephrology consulted   - D5W  - trend bnp     Dementia-chronic could be Alzheimer's type versus alcohol induced  - resides at the [facility]-independent per report  - TSH, b12 wnl   - was in the process of moving [facility] in [place] prior to admission   Plan to go to subacute rehab then to memory care in [place]  - Guardian Juanito requesting NANCY before this transition   - PT OT eval ongoing     Fluids: none  Diet: modified   DVT prophylaxis: Eliquis      Dispo: cardiac tele, once medically stable with Hg and Na can go to subacute rehab     Code status: Full code    Questions/concerns were discussed with patient and/or family by  bedside.      Thank You,  Edwadr Pa MD     Hospitalist with [facility]     Subjective:     Still wants water.   Tolerating modified diet this AM.     OBJECTIVE:    Blood pressure 142/77, pulse 86, temperature 98.4 °F (36.9 °C), temperature source Oral, resp. rate 16, height 5' 9\" (1.753 m), weight 147 lb 7.8 oz (66.9 kg), SpO2 94%.    Temp:  [97.4 °F (36.3 °C)-98.4 °F (36.9 °C)] 98.4 °F (36.9 °C)  Pulse:  [] 86  Resp:  [16-20] 16  BP: (123-145)/() 142/77  SpO2:  [91 %-94 %] 94 %      Intake/Output:    Intake/Output Summary (Last 24 hours) at 6/4/2025 1033  Last data filed at 6/4/2025 1005  Gross per 24 hour   Intake 1194.58 ml   Output 750 ml   Net 444.58 ml       Last 3 Weights   06/04/25 0520 147 lb 7.8 oz (66.9 kg)   05/31/25 0518 152 lb 12.5 oz (69.3 kg)   05/28/25 0500 154 lb 5.2 oz (70 kg)   05/27/25 0400 156 lb 8.4 oz (71 kg)   05/25/25 0200 151 lb 14.4 oz (68.9 kg)   05/22/25 2238 157 lb 6.5 oz (71.4 kg)   05/22/25 1426 180 lb (81.6 kg)   10/08/24 1853 180 lb (81.6 kg)   12/18/19 0857 188 lb (85.3 kg)       Exam   Gen: No acute distress, alert and oriented x 2  Pulm: Good air entry no wheezes or congestion today  CV: Heart with regular rate and rhythm  Abd: Abdomen soft, non-tender  MSK: no clubbing, no cyanosis    Data Review:       Labs:     Recent Labs   Lab 05/31/25  0646 06/01/25  0130 06/02/25  0600 06/03/25  0542 06/04/25  0643   RBC 3.34*   < > 3.08* 3.30* 2.91*   HGB 8.5*   < > 7.6*  7.7* 8.5* 7.2*   HCT 27.9*   < > 25.2*  25.6* 27.8* 23.8*   MCV 83.5   < > 83.1 84.2 81.8   MCH 25.4*   < > 25.0* 25.8* 24.7*   MCHC 30.5*   < > 30.1* 30.6* 30.3*   RDW 16.8*   < > 17.2* 17.4* 17.4*   NEPRELIM 13.37*  --   --   --   --    WBC 15.5*   < > 13.7* 11.9* 11.3*   .0   < > 520.0* 539.0* 516.0*  516.0*    < > = values in this interval not displayed.         Recent Labs   Lab 06/03/25  1346 06/03/25  1938 06/04/25  0643   * 226* 168*  168*   BUN 28* 31* 25*  25*    CREATSERUM 1.57* 1.60* 1.45*  1.45*   EGFRCR 45* 44* 50*  50*   CA 8.4* 8.0* 7.8*  7.8*   * 149* 148*  148*   K 3.7 3.6 3.1*  3.1*   * 116* 116*  116*   CO2 22.0 21.0 22.0  22.0       Recent Labs   Lab 05/29/25  0425 05/30/25  0412 06/04/25  0643   ALT 43 46  --    AST 42* 39*  --    ALB 3.3 3.4 2.9*         Imaging:  No results found.        Meds:     Scheduled Medications[1]  Medication Infusions[2]  PRN Medications[3]           [1]    insulin aspart  3 Units Subcutaneous TID CC    magnesium sulfate  2 g Intravenous Once    potassium chloride  40 mEq Intravenous Once    escitalopram  5 mg Oral Nightly    OLANZapine  2.5 mg Oral Nightly    clonazePAM  0.5 mg Oral Nightly    thiamine  100 mg Intravenous Daily    cefTRIAXone  2 g Intravenous Q24H    dilTIAZem  90 mg Oral 4 times per day    ergocalciferol  50,000 Units Oral Weekly    insulin aspart  1-5 Units Subcutaneous TID CC    metoprolol tartrate  25 mg Oral 2x Daily(Beta Blocker)   [2]    dextrose 100 mL/hr at 06/04/25 1007    continuous dose heparin 1,200 Units/hr (06/03/25 1541)    dilTIAZem Stopped (06/04/25 0000)   [3]   LORazepam    LORazepam    ipratropium-albuterol    dilTIAZem    acetaminophen    ondansetron    glucose **OR** glucose **OR** glucose-vitamin C **OR** dextrose **OR** glucose **OR** glucose **OR** glucose-vitamin C

## 2025-06-04 NOTE — CONGREGATE LIVING REVIEW
[facility] Living Authorization    The [facility] Review Committee has reviewed this case and the patient IS APPROVED for discharge to a facility for Short Term Skilled once the following procedure is followed:     - The physician discharge instructions (contained within the TEN note for SNF) must inlcude the below appropriate and approved COVID instructions to the facility    For questions regarding CLRC approval process, please contact the CM assigned to the case.  For questions regarding RN discharge workflow, please contact the unit Clinical Leader.

## 2025-06-04 NOTE — DIETARY NOTE
ADULT NUTRITION REASSESSMENT    Pt is at moderate nutrition risk.  Pt meets moderate malnutrition criteria.      RECOMMENDATIONS TO MD:   RD added ONS TID to meal trays. RN staff to provide 1:1 assistance at meal times with encouragement.  See Nutrition Intervention for diet and ONS specifics     ADMITTING DIAGNOSIS:  Acute gastrointestinal hemorrhage [K92.2]  PERTINENT PAST MEDICAL HISTORY: Past Medical History[1]    PATIENT STATUS:   Initial 05/29/25: Pt assessed due to new MST entered by RN, at risk for recent wt loss and poor appetite/intake. Pt presented to hospital with c/o diarrhea, rectal bleeding. Pt has extensive PMH including dementia and stroke. Noted code blue called on 5/24 due to increased secretions with tongue swelling and bradycardia which lead to asystolic arrest - orally intubated 5/24, extubated on 5/25. Failed BSSE on 5/25 however passed on 5/26 and PO diet resumed. Pt lying in bed at time of visit. Pt demanding ice water. Redirected several times and explained to pt that he can only have thickened water at this time. Provided pt with several spoonfuls of thickened water however continued to request ice water. Pt is a poor historian however reports typical wt 185 lbs. Limited wt hx available in EMR however noted most recent wt  lbs on 10/8/24. Per RN pt refusing all food today, requesting only water but does not want the thickened water. Noted pt from independent living with spouse who recently passed. Pt has a legal guardian who was in the process of moving the pt to a memory care unit.           Update 6/4: Pt reassessed per protocol. Pt with fluctuating intakes since last RD visit, refusing meals over the weekend, however improved intake since 6/2. Had 100% of breakfast this AM- pureed pancakes, oatmeal, and juice. Pt is not drinking the SF shakes. Offered to add Magic cup instead, pt agreeable to try. Will adjust ONS accordingly. Per palliative care note, legal guardian stated no  plans for tube feeds. Still plan for memory care after rehab.     FOOD/NUTRITION RELATED HISTORY:  Appetite: Poor, but improving  Intake: Good intake prior to intubated on 5/24; fluctuating intake every extubated on 5/25.  Intake Meeting Needs: No, but oral nutrition supplements (ONS) to maximize  Percent Meals Eaten (last 6 days)       Date/Time Percent Meals Eaten (%)    05/29/25 1026 0 %     Percent Meals Eaten (%): refused at 05/29/25 1026    05/29/25 1130 0 %     Percent Meals Eaten (%): refused at 05/29/25 1130    05/29/25 1727 5 %     Percent Meals Eaten (%): 6 spoonfuls vegetable soup at 05/29/25 1727    05/30/25 0900 75 %     Percent Meals Eaten (%): vanilla pudding at 05/30/25 0900    05/30/25 1000 30 %     Percent Meals Eaten (%): peaches and pears at 05/30/25 1000    05/30/25 1247 100 %     Percent Meals Eaten (%): cottage cheese at 05/30/25 1247    05/31/25 2124 0 %    05/31/25 2324 0 %    05/31/25 2353 0 %    06/01/25 0304 0 %    06/01/25 0424 0 %    06/01/25 0800 0 %    06/01/25 1802 0 %     Percent Meals Eaten (%): npo at 06/01/25 1802    06/02/25 0900 0 %    06/02/25 1326 45 %    06/02/25 1900 20 %    06/03/25 0907 80 %    06/03/25 1459 50 %    06/03/25 1835 5 %    06/04/25 1005 100 %        Food Allergies: No Known Food Allergies (NKFA)  Cultural/Ethnic/Denominational Preferences: None    GASTROINTESTINAL: +BM 6/4 (soft, brown, extra large), Loss of appetite, and abdomen is soft, non-distended, non-tender  UO: 750 mL output over the past 24 hrs  I/Os: +1.2 L total this admission    MEDICATIONS: reviewed   dextrose 100 mL/hr at 06/04/25 1007    continuous dose heparin 1,200 Units/hr (06/03/25 1541)    dilTIAZem Stopped (06/04/25 0000)      insulin aspart  3 Units Subcutaneous TID CC    magnesium sulfate  2 g Intravenous Once    potassium chloride  40 mEq Intravenous Once    escitalopram  5 mg Oral Nightly    OLANZapine  2.5 mg Oral Nightly    clonazePAM  0.5 mg Oral Nightly    thiamine  100 mg  Intravenous Daily    cefTRIAXone  2 g Intravenous Q24H    dilTIAZem  90 mg Oral 4 times per day    ergocalciferol  50,000 Units Oral Weekly    insulin aspart  1-5 Units Subcutaneous TID CC    metoprolol tartrate  25 mg Oral 2x Daily(Beta Blocker)     LABS: reviewed; recent A1C 5.9 on 5/25/25; recent POC Glucose 195-225  Recent Labs     06/01/25  0518 06/01/25  1041 06/03/25  1346 06/03/25  1938 06/04/25  0643   GLU  --    < > 199* 226* 168*  168*   BUN  --    < > 28* 31* 25*  25*   CREATSERUM  --    < > 1.57* 1.60* 1.45*  1.45*   CA  --    < > 8.4* 8.0* 7.8*  7.8*   MG 2.0  --   --   --  1.8   NA  --    < > 152* 149* 148*  148*   K  --    < > 3.7 3.6 3.1*  3.1*   CL  --    < > 118* 116* 116*  116*   CO2  --    < > 22.0 21.0 22.0  22.0   PHOS 2.8  --   --   --  2.8   OSMOCALC  --    < > 325* 322* 314*  314*    < > = values in this interval not displayed.     WEIGHT HISTORY:  Patient Weight(s) for the past 336 hrs:   Weight   06/04/25 0520 66.9 kg (147 lb 7.8 oz)   05/31/25 0518 69.3 kg (152 lb 12.5 oz)   05/28/25 0500 70 kg (154 lb 5.2 oz)   05/27/25 0400 71 kg (156 lb 8.4 oz)   05/25/25 0200 68.9 kg (151 lb 14.4 oz)   05/22/25 2238 71.4 kg (157 lb 6.5 oz)   05/22/25 1426 81.6 kg (180 lb)     Wt Readings from Last 10 Encounters:   06/04/25 66.9 kg (147 lb 7.8 oz)   10/08/24 81.6 kg (180 lb)   12/18/19 85.3 kg (188 lb)     ANTHROPOMETRICS:  HT: 175.3 cm (5' 9\")  Wt Readings from Last 1 Encounters:   06/04/25 66.9 kg (147 lb 7.8 oz)   Last weight: Likely accurate and reflects 16% (28 lb) wt loss from recent wt in EMR ~6 months ago   Dosing Weight: 69 kg (152 lbs) - recent weight / lowest wt this admission taken on 5/25/25, utilized for anthropometric calculations  BMI: Body mass index is 22.43 kg/m².  BMI CLASSIFICATION: 18.5-24.9 kg/m2 - WNL  IBW/lbs (Calculated) Male: 160 lbs           95% IBW  Usual Body Wt: 185 lbs (per pt report)      82% UBW    NUTRITION RELATED PHYSICAL FINDINGS:  - Nutrition Focused  Physical Exam (NFPE): moderate depletion body fat orbital region and fat overlying rib cage region and moderate depletion muscle mass temple region, clavicle region, scapular region, shoulder region, thigh region, and calf region  - Fluid Accumulation: none . See RN documentation for details  - Skin Integrity: reddened to sacrum. See RN documentation for details    CRITERIA FOR MALNUTRITION DIAGNOSIS:  Criteria for non-severe malnutrition diagnosis: chronic illness related to wt loss 10% in 6 months and body fat moderate depletion and muscle mass moderate depletion.    NUTRITION DIAGNOSIS/PROBLEM:   Moderate Malnutrition related to Chronic - Physiological causes resulting in anorexia or diminished intake as evidenced by wt loss 10% in 6 months and body fat moderate depletion and muscle mass moderate depletion    NUTRITION DIAGNOSIS PROGRESS:  Improvement (unresolved) - po slightly improving, adjusted ONS to liking    NUTRITION INTERVENTION:     NUTRITION PRESCRIPTION:   Estimated Nutrition needs: --dosing wt of 69 kg - wt taken on 5/25/25  Calories: 7027-1662 calories/day (25-30 calories per kg Dosing wt)  Protein:  g protein/day (1.2-1.5 g protein/kg Dosing wt)  Fluid Needs: 8133-7463 ml/day (1 ml/kcal)    - Diet:       Procedures    Cardiac diet Cardiac, No Straw; Calorie Restriction/Carb Controlled: 2000 kcal/75 grams; Fluid Consistency: Nectar Thick / Mildly Thick Liquids; Texture Consistency: Pureed; Is Patient on Accuchecks? Yes; Is Patient on Suicide Precautions? No   **diet liberalized to least restrictive to promote improved PO intake**     - Nutrition Care Plan: Adjusted diet to least restrictive to maximize intake, Encouraged increased PO intake, and Initiated ONS (oral nutritional supplements)  - ONS (Oral Nutrition Supplements)/Meals/Snacks: Magic Cup (290 calories/ 9 g protein each) TID Chocolate or Mixed berry   - Vitamin and mineral supplements: vitamin D  - Feeding assistance: meal set up and  assistance at meal times and with menu selection, encouragement at meal times  - Nutrition education: not appropriate  - Coordination of nutrition care: collaboration with other providers - discussed with RN on unit  - Discharge and transfer of nutrition care to new setting or provider: monitor plans    MONITOR AND EVALUATE/NUTRITION GOALS:  - Food and Nutrient Intake:      Monitor: adequacy of PO intake and adequacy of supplement intake  - Food and Nutrient Administration:      Monitor: need for temporary nutrition support and goc/family wishes regarding nutrition  - Anthropometric Measurement:    Monitor weight  - Nutrition Goals:      PO and supplement greater than 75% of needs, euglycemia, minimize lean body mass loss, prevent skin breakdown, and halt true wt loss    DIETITIAN FOLLOW UP: RD to follow and monitor nutrition status      Christie Gambino, MS, RD, LDN  Clinical Dietitian  [id number]         [1]   Past Medical History:   A-fib (HCC)    CVA (cerebral vascular accident) (HCC)    Diabetes (HCC)    Diverticulosis of large intestine    Scattered pandiverticulosis

## 2025-06-04 NOTE — PROGRESS NOTES
NEPHROLOGY DAILY PROGRESS NOTE       SUBJECTIVE:  Denies SOB or pain         OBJECTIVE:    Total Intake/Output:    Intake/Output Summary (Last 24 hours) at 6/4/2025 1033  Last data filed at 6/4/2025 1005  Gross per 24 hour   Intake 1194.58 ml   Output 750 ml   Net 444.58 ml         PHYSICAL EXAM:  /77 (BP Location: Right arm)   Pulse 86   Temp 98.4 °F (36.9 °C) (Oral)   Resp 16   Ht 5' 9\" (1.753 m)   Wt 147 lb 7.8 oz (66.9 kg)   SpO2 94%   BMI 21.78 kg/m²   GEN: NAD  HEENT: NCAT    CHEST: CTA b/l  CARDIAC: S1S2 normal  ABD: soft, NT/ND  EXT: no lower ext edema  NEURO: awake, alert  SKIN: warm, dry      CURRENT MEDICATIONS:  Current Hospital Medications[1]    LABS:  Patient Labs Reviewed in Detail. Pertinent Labs as follows:  Recent Labs   Lab 06/03/25  1346 06/03/25  1938 06/04/25  0643   * 226* 168*  168*   BUN 28* 31* 25*  25*   CREATSERUM 1.57* 1.60* 1.45*  1.45*   EGFRCR 45* 44* 50*  50*   CA 8.4* 8.0* 7.8*  7.8*   * 149* 148*  148*   K 3.7 3.6 3.1*  3.1*   * 116* 116*  116*   CO2 22.0 21.0 22.0  22.0     Recent Labs   Lab 05/31/25  0646 06/01/25  0130 06/02/25  0600 06/03/25  0542 06/04/25  0643   RBC 3.34*   < > 3.08* 3.30* 2.91*   HGB 8.5*   < > 7.6*  7.7* 8.5* 7.2*   HCT 27.9*   < > 25.2*  25.6* 27.8* 23.8*   MCV 83.5   < > 83.1 84.2 81.8   MCH 25.4*   < > 25.0* 25.8* 24.7*   MCHC 30.5*   < > 30.1* 30.6* 30.3*   RDW 16.8*   < > 17.2* 17.4* 17.4*   NEPRELIM 13.37*  --   --   --   --    WBC 15.5*   < > 13.7* 11.9* 11.3*   .0   < > 520.0* 539.0* 516.0*  516.0*    < > = values in this interval not displayed.           IMAGING:  No results found.     ASSESSMENT AND PLAN:   79 year old male with PMH sig for dementia CVA, Afib, HTN, DM2, CKD. Admitted with rectal bleeding, hospital course complicated by cardiac arrest. Nephrology consulted for hypernatremia and ZOE:     Hypernatremia:  - secondary to decreased PO intake in the setting of NPO status  - total water  deficit 2.97  - improving, continue D5W   - encourage PO intake of free water as able   - follow Na level      ZOE on CKD:  - Baseline creatinine variable but more recently ~ 1.3  - possible pre-renal ZOE,  urine Na 58 - but receiving IVFs   - ZOE improving    - follow renal fxn and I/Os    - renally adjust medications  - No acute indication for RRT.    Hypokalemia  - receiving replacement     Dw RN     We will continue to follow MD [facility] - Nephrology          [1]   Current Facility-Administered Medications   Medication Dose Route Frequency    insulin aspart (NovoLOG) 100 Units/mL FlexPen 3 Units  3 Units Subcutaneous TID CC    magnesium sulfate in sterile water for injection 2 g/50mL IVPB premix 2 g  2 g Intravenous Once    potassium chloride 40 mEq in 250mL sodium chloride 0.9% IVPB premix  40 mEq Intravenous Once    dextrose 5% infusion   Intravenous Continuous    escitalopram (Lexapro) tab 5 mg  5 mg Oral Nightly    OLANZapine (ZyPREXA) tab 2.5 mg  2.5 mg Oral Nightly    clonazePAM (KlonoPIN) tab 0.5 mg  0.5 mg Oral Nightly    LORazepam (Ativan) tab 0.5 mg  0.5 mg Oral BID PRN    thiamine 100 mg/mL injection 100 mg  100 mg Intravenous Daily    cefTRIAXone (Rocephin) 2 g in sodium chloride 0.9% 100 mL IVPB-ADDV  2 g Intravenous Q24H    heparin (Porcine) 14085 units/250mL infusion ACS/AFIB CONTINUOUS  200-3,000 Units/hr Intravenous Continuous    LORazepam (Ativan) 2 mg/mL injection 0.5 mg  0.5 mg Intravenous Q6H PRN    dilTIAZem (cardIZEM) tab 90 mg  90 mg Oral 4 times per day    ergocalciferol (Vitamin D2) cap 50,000 Units  50,000 Units Oral Weekly    ipratropium-albuterol (Duoneb) 0.5-2.5 (3) MG/3ML inhalation solution 3 mL  3 mL Nebulization Q6H PRN    insulin aspart (NovoLOG) 100 Units/mL FlexPen 1-5 Units  1-5 Units Subcutaneous TID CC    dilTIAZem 10 mg BOLUS FROM BAG infusion  10 mg Intravenous Q1H PRN    dilTIAZem (cardIZEM) 100 mg in sodium chloride 0.9% 100 mL  IVPB-ADDV  2.5-20 mg/hr Intravenous Continuous    metoprolol tartrate (Lopressor) tab 25 mg  25 mg Oral 2x Daily(Beta Blocker)    acetaminophen (Tylenol) tab 650 mg  650 mg Oral Q4H PRN    ondansetron (Zofran) 4 MG/2ML injection 4 mg  4 mg Intravenous Q6H PRN    glucose (Dex4) 15 GM/59ML oral liquid 15 g  15 g Oral Q15 Min PRN    Or    glucose (Glutose) 40% oral gel 15 g  15 g Oral Q15 Min PRN    Or    glucose-vitamin C (Dex-4) chewable tab 4 tablet  4 tablet Oral Q15 Min PRN    Or    dextrose 50% injection 50 mL  50 mL Intravenous Q15 Min PRN    Or    glucose (Dex4) 15 GM/59ML oral liquid 30 g  30 g Oral Q15 Min PRN    Or    glucose (Glutose) 40% oral gel 30 g  30 g Oral Q15 Min PRN    Or    glucose-vitamin C (Dex-4) chewable tab 8 tablet  8 tablet Oral Q15 Min PRN

## 2025-06-04 NOTE — PLAN OF CARE
Problem: Diabetes/Glucose Control  Goal: Glucose maintained within prescribed range  Description: INTERVENTIONS:  - Monitor Blood Glucose as ordered  - Assess for signs and symptoms of hyperglycemia and hypoglycemia  - Administer ordered medications to maintain glucose within target range  - Assess barriers to adequate nutritional intake and initiate nutrition consult as needed  - Instruct patient on self management of diabetes  Outcome: Progressing     Problem: CARDIOVASCULAR - ADULT  Goal: Maintains optimal cardiac output and hemodynamic stability  Description: INTERVENTIONS:  - Monitor vital signs, rhythm, and trends  - Monitor for bleeding, hypotension and signs of decreased cardiac output  - Evaluate effectiveness of vasoactive medications to optimize hemodynamic stability  - Monitor arterial and/or venous puncture sites for bleeding and/or hematoma  - Assess quality of pulses, skin color and temperature  - Assess for signs of decreased coronary artery perfusion - ex. Angina  - Evaluate fluid balance, assess for edema, trend weights  Outcome: Progressing  Goal: Absence of cardiac arrhythmias or at baseline  Description: INTERVENTIONS:  - Continuous cardiac monitoring, monitor vital signs, obtain 12 lead EKG if indicated  - Evaluate effectiveness of antiarrhythmic and heart rate control medications as ordered  - Initiate emergency measures for life threatening arrhythmias  - Monitor electrolytes and administer replacement therapy as ordered  Outcome: Progressing     Problem: GASTROINTESTINAL - ADULT  Goal: Maintains or returns to baseline bowel function  Description: INTERVENTIONS:  - Assess bowel function  - Maintain adequate hydration with IV or PO as ordered and tolerated  - Evaluate effectiveness of GI medications  - Encourage mobilization and activity  - Obtain nutritional consult as needed  - Establish a toileting routine/schedule  - Consider collaborating with pharmacy to review patient's medication  profile  Outcome: Progressing     Problem: HEMATOLOGIC - ADULT  Goal: Maintains hematologic stability  Description: INTERVENTIONS  - Assess for signs and symptoms of bleeding or hemorrhage  - Monitor labs and vital signs for trends  - Administer supportive blood products/factors, fluids and medications as ordered and appropriate  - Administer supportive blood products/factors as ordered and appropriate  Outcome: Progressing  Goal: Free from bleeding injury  Description: (Example usage: patient with low platelets)  INTERVENTIONS:  - Avoid intramuscular injections, enemas and rectal medication administration  - Ensure safe mobilization of patient  - Hold pressure on venipuncture sites to achieve adequate hemostasis  - Assess for signs and symptoms of internal bleeding  - Monitor lab trends  - Patient is to report abnormal signs of bleeding to staff  - Avoid use of toothpicks and dental floss  - Use electric shaver for shaving  - Use soft bristle tooth brush  - Limit straining and forceful nose blowing  Outcome: Progressing     Problem: Patient Centered Care  Goal: Patient preferences are identified and integrated in the patient's plan of care  Description: Interventions:  - What would you like us to know as we care for you? From [facility]  - Provide timely, complete, and accurate information to patient/family  - Incorporate patient and family knowledge, values, beliefs, and cultural backgrounds into the planning and delivery of care  - Encourage patient/family to participate in care and decision-making at the level they choose  - Honor patient and family perspectives and choices  Outcome: Progressing     Problem: Patient/Family Goals  Goal: Patient/Family Long Term Goal  Description: Patient's Long Term Goal:    Interventions:  - See additional Care Plan goals for specific interventions  Outcome: Progressing  Goal: Patient/Family Short Term Goal  Description: Patient's Short Term Goal:    Interventions:   -  See additional Care Plan goals for specific interventions  Outcome: Progressing     Problem: Delirium  Goal: Minimize duration of delirium  Description: Interventions:  - Encourage use of hearing aids, eye glasses  - Promote highest level of mobility daily  - Provide frequent reorientation  - Promote wakefulness i.e. lights on, blinds open  - Promote sleep, encourage patient's normal rest cycle i.e. lights off, TV off, minimize noise and interruptions  - Encourage family to assist in orientation and promotion of home routines  Outcome: Progressing     Problem: RESPIRATORY - ADULT  Goal: Achieves optimal ventilation and oxygenation  Description: INTERVENTIONS:  - Assess for changes in respiratory status  - Assess for changes in mentation and behavior  - Position to facilitate oxygenation and minimize respiratory effort  - Oxygen supplementation based on oxygen saturation or ABGs  - Provide Smoking Cessation handout, if applicable  - Encourage broncho-pulmonary hygiene including cough, deep breathe, Incentive Spirometry  - Assess the need for suctioning and perform as needed  - Assess and instruct to report SOB or any respiratory difficulty  - Respiratory Therapy support as indicated  - Manage/alleviate anxiety  - Monitor for signs/symptoms of CO2 retention  Outcome: Progressing     Problem: GENITOURINARY - ADULT  Goal: Absence of urinary retention  Description: INTERVENTIONS:  - Assess patient’s ability to void and empty bladder  - Monitor intake/output and perform bladder scan as needed  - Follow urinary retention protocol/standard of care  - Consider collaborating with pharmacy to review patient's medication profile  - Implement strategies to promote bladder emptying  Outcome: Progressing     Problem: METABOLIC/FLUID AND ELECTROLYTES - ADULT  Goal: Glucose maintained within prescribed range  Description: INTERVENTIONS:  - Monitor Blood Glucose as ordered  - Assess for signs and symptoms of hyperglycemia and  hypoglycemia  - Administer ordered medications to maintain glucose within target range  - Assess barriers to adequate nutritional intake and initiate nutrition consult as needed  - Instruct patient on self management of diabetes  Outcome: Progressing  Goal: Electrolytes maintained within normal limits  Description: INTERVENTIONS:  - Monitor labs and rhythm and assess patient for signs and symptoms of electrolyte imbalances  - Administer electrolyte replacement as ordered  - Monitor response to electrolyte replacements, including rhythm and repeat lab results as appropriate  - Fluid restriction as ordered  - Instruct patient on fluid and nutrition restrictions as appropriate  Outcome: Progressing  Goal: Hemodynamic stability and optimal renal function maintained  Description: INTERVENTIONS:  - Monitor labs and assess for signs and symptoms of volume excess or deficit  - Monitor intake, output and patient weight  - Monitor urine specific gravity, serum osmolarity and serum sodium as indicated or ordered  - Monitor response to interventions for patient's volume status, including labs, urine output, blood pressure (other measures as available)  - Encourage oral intake as appropriate  - Instruct patient on fluid and nutrition restrictions as appropriate  Outcome: Progressing     Problem: SKIN/TISSUE INTEGRITY - ADULT  Goal: Skin integrity remains intact  Description: INTERVENTIONS  - Assess and document risk factors for pressure ulcer development  - Assess and document skin integrity  - Monitor for areas of redness and/or skin breakdown  - Initiate interventions, skin care algorithm/standards of care as needed  Outcome: Progressing     Problem: MUSCULOSKELETAL - ADULT  Goal: Return mobility to safest level of function  Description: INTERVENTIONS:  - Assess patient stability and activity tolerance for standing, transferring and ambulating w/ or w/o assistive devices  - Assist with transfers and ambulation using safe  patient handling equipment as needed  - Ensure adequate protection for wounds/incisions during mobilization  - Obtain PT/OT consults as needed  - Advance activity as appropriate  - Communicate ordered activity level and limitations with patient/family  Outcome: Progressing     Problem: NEUROLOGICAL - ADULT  Goal: Achieves stable or improved neurological status  Description: INTERVENTIONS  - Assess for and report changes in neurological status  - Initiate measures to prevent increased intracranial pressure  - Maintain blood pressure and fluid volume within ordered parameters to optimize cerebral perfusion and minimize risk of hemorrhage  - Monitor temperature, glucose, and sodium. Initiate appropriate interventions as ordered  Outcome: Progressing

## 2025-06-04 NOTE — CM/SW NOTE
Received MDO for [facility] at [facility].  Per [facility], their contracted/preferred provider is [facility].    [facility] referral sent to [facility] via [facility].  They marked themselves available. SW requested [facility] to f/up w/ pt's Guardian Juanito.    PLAN: [place] CRUZ w/ [facility], Ambulance on WC, PCS needs date - pending med clear        SW/CM to remain available for support and/or discharge planning.            MS SeraW, LSW [id number]

## 2025-06-04 NOTE — SLP NOTE
SPEECH DAILY NOTE - INPATIENT    ASSESSMENT & PLAN   ASSESSMENT  PPE REQUIRED. THIS THERAPIST WORE GLOVES, DROPLET MASK, AND GOGGLES FOR DURATION OF EVALUATION. HANDS WASHED UPON ENTRANCE/EXIT.    SLP f/u for ongoing dysphagia tx/meal assessment per recommendations of puree/mildly thick liquids per upgrade on 6/3/24. RN reports pt tolerates diet and medication well with no overt clinical s/s aspiration, however, pt requesting thin liquid water. Pt denies any swallowing challenges, however, RONALD/mentation significantly fluctuating throughout evaluation.    Pt positioned upright in bed. Pt afebrile, tolerating room air with oxygen status 93 prior to the start of oral trials. SLP reviewed aspiration precautions and safe swallowing compensatory strategies with the patient. Safe swallow guidelines remain written on the white board in purple. Diet recommendations remain on the whiteboard in the room. Patient's RN v/u. Provided 1:1 feed assistance, pt tolerates puree and mildly thick liquids via open cup with no overt clinical signs/symptoms of aspiration. Soft solid trials attempted, however, unable to initiate bite response. Pt trialed with thin liquids x1 and immediate coughing/choking with desaturation into mid 80's occurred. Trials discontinued. Oral/buccal cavities clear of residue following all trials.     Pt requesting ice cream. SLP discussed with RD regarding magic cups on mildly thick liquid recommendations. Pt cleared for magic cup ice cream and RD placed order.     PLAN: SLP to f/u x1 meal assessment, monitor CXR, and VFSS if clinically warranted.     Diet Recommendations - Solids: Puree  Diet Recommendations - Liquids: Nectar thick liquids/ Mildly thick    Compensatory Strategies Recommended:  (no straw)  Aspiration Precautions: Upright position, Slow rate, Small bites, Small sips  Medication Administration Recommendations: Whole in puree    Patient Experiencing Pain: No    Treatment Plan  Treatment  Plan/Recommendations: Aspiration precautions    Interdisciplinary Communication: Discussed with RN  Recommendations posted at bedside            GOALS  Goal #1 The patient will tolerate puree consistency and moderately thick liquids without overt signs or symptoms of aspiration with 100 % accuracy over 1-2 session(s).    No CSA on current diet.       GOAL MET 6/03/25      Goal #2 The patient/family/caregiver will demonstrate understanding and implementation of aspiration precautions and swallow strategies independently over 1-2 session(s).    Swallowing precautions posted in room.     In Progress   Goal #3 The patient will tolerate trial upgrade of soft solids consistency and mildly thick/thin liquids without overt signs or symptoms of aspiration with 100 % accuracy over 1-2 session(s).    UPGRADED TO MILDLY-thick liquids. To f/u for safe tolerance; possible continued upgrade.    In Progress   Goal #4 The patient will utilize compensatory strategies as outlined by  BSSE (clinical evaluation) including Slow rate, Small bites, Small sips, Multiple swallows, Alternate liquids/solids, No straws, Upright 90 degrees, Upright 90 degrees 30 mins after meal, Liquids via tsp amount only, Eliminate distractions, Feed patient with 1:1 assistance 100 % of the time across 2 sessions.    SLP fed Pt; strategies executed.     In Progress     FOLLOW UP  Follow Up Needed (Documentation Required): Yes  SLP Follow-up Date: 06/05/25  Duration: 1 week    Session: 2    If you have any questions, please contact BIGG Schultz M.S. CCC-SLP  Speech Language Pathologist  Phone Number Ext. [phone]

## 2025-06-04 NOTE — PROGRESS NOTES
Patient is a 79 year old   male with history of HTN, DM2, A-fib, CVA and history of dementia who presented to the hospital from [facility] for recent fall and found with low hemoglobin of 6.4.  Patient admitted to the PCU.  Patient has been demonstrating confusion and agitation.  Patient started on CIWA protocol and psych consult requested for evaluation and advice.  Consult Duration     The patient seen for over 50-minute, follow-up evaluation, over 50% counseling and coordinating care addressing confusion, agitation.    Record reviewed, communication with attending, communication with RN and patient seen face to face evaluation.    History of Present Illness:     The patient seen yesterday and the patient started on Ativan 0.5 mg IV 3 times daily and mirtazapine 7.5 mg nightly.    According to the team the patient has been doing much better with his orientation otherwise he has been behaving awkwardly at time and mimicking the staff and getting easily irritated.    Vital sign has been stable today.  The patient received Remeron 7.5 mg nightly and Ativan 0.5 mg 3 times daily.  Patient not indicated for any Haldol.    The patient is seen today sitting in his bed.  Patient presented calm and cooperative.  Patient reporting \"I am getting better every day\".  Patient sensitive to mentioning negative things in his medical condition and trying to minimize completely any alcoholism.  Patient reporting that he is not drinking every day but he drink more than he should and the Memorial Day weekend.    Patient noticeably still irritated and getting little defensive otherwise his orientation has been improved and has not been demonstrating much of withdrawal symptoms.  Otherwise upon shaking his hand he is demonstrating slight tremors and patient reporting this is his nervousness and he had it all along.    The patient continues treating alternation in his mood and cognition with  improvement on the current regiment.  Patient denying any homicidal or suicidal ideation.  Patient denying any auditory or visual hallucination.  Patient cleared by the speech pathologist.  Patient reporting that he slept reasonably better but not fully well.    The patient who has been demonstrating delirious episode has been demonstrating reasonable improvement gradually.  Patient definitely with anxiety and will benefit from alternative approach of treatment.      Past Psychiatric/Medication History:  1. Prior diagnoses: Generalized anxiety disorder.  2. Past psychiatric inpatient: No psych admission reported  3. Past outpatient history: PCP  4. Past suicide history: Denied any  5. Medication history: Diazepam    Social History:   Patient is a  male lives in [facility] home.  Patient's POA is Clem Naidu.  Patient with history of CVA.  Questionable diagnosis of dementia.  Patient denying history of substance abuse otherwise admitted to drinking socially with recent excessive drinking.    Family History:  Patient denies psychiatric family history  Medical History:   Past Medical History  Past Medical History[1]    Past Surgical History  Past Surgical History[2]    Family History  Family History[3]    Social History  Short Social Hx on File[4]        Current Medications:  Current Hospital Medications[5]  Prescriptions Prior to Admission[6]    Allergies  Allergies[7]    Review of Systems:   As by Admitting/Attending    Results:   Laboratory Data:  Lab Results   Component Value Date    WBC 11.9 (H) 06/03/2025    HGB 8.5 (L) 06/03/2025    HCT 27.8 (L) 06/03/2025    .0 (H) 06/03/2025    CREATSERUM 1.57 (H) 06/03/2025    BUN 28 (H) 06/03/2025     (H) 06/03/2025    K 3.7 06/03/2025     (H) 06/03/2025    CO2 22.0 06/03/2025     (H) 06/03/2025    CA 8.4 (L) 06/03/2025    ALB 3.4 05/30/2025    ALKPHO 64 05/30/2025    TP 6.4 05/30/2025    AST 39 (H) 05/30/2025    ALT 46 05/30/2025    PTT 51.3  (H) 06/03/2025    INR 1.47 (H) 05/23/2025    PTP 18.6 (H) 05/23/2025    T4F 0.8 05/29/2025    TSH 1.122 05/29/2025    MG 2.0 06/01/2025    PHOS 2.8 06/01/2025    B12 477 05/29/2025         Imaging:  No results found.      Vital Signs:   Blood pressure 132/70, pulse 78, temperature 97.4 °F (36.3 °C), temperature source Oral, resp. rate 18, height 69\", weight 69.3 kg (152 lb 12.5 oz), SpO2 92%.    Mental Status Exam:   Appearance: Stated age male, in hospital gown, sitting down in hospital bed.  Psychomotor: No psychomotor agitation or retardation but slight restlessness.  Orientation: Alert and oriented to person, place and date.  Patient is noticeably less confused.  Gait: Not evaluated.  Attitude/Coorperation: Patient presented more attentive otherwise slightly defensive.  Behavior: Staff reporting some awkward behavior.  Speech: Regular rate and rhythm speech.  No dysarthria  Mood: Patient admitted that he has been feeling anxious.  Affect: Anxious affect congruent with mood.  Thought process: Improvement in thought process.  Thought content: No reports of  suicidal or homicidal ideation.  Perceptions: Patient has been demonstrating response to internal stimuli.  Concentration: Grossly distracted  Memory: Grossly impaired with some improvement  Intellect: Average.  Judgment and Insight: Questionable.     Impression:     Episodic mood disorder.  Delirium due to another medical condition.  Generalized anxiety disorder.  Benzodiazepine withdrawal syndrome.  Acute gastrointestinal hemorrhage  Tremor  Mixed Alzheimer and vascular dementia (HCC)      The patient is a 79-year-old   male with history of A-fib, HTN, DM2 and history of CVA who presented to the hospital with fall and bloody bowel movement.  Patient found with hemoglobin of 6.4.  Patient during this admission started to demonstrate alternation in his mood, episode of confusion, restlessness, agitation hallucinating.  Patient started on CIWA  after he mentioning wine.    The patient seen today in his room and the patient demonstrating improvement in his cognitive function considering his condition previously.  Patient admitting history of anxiety.  Patient admitting some sign of depression.  Patient endorsing the fact that he have history of social drinking with recent excessive drinking on memorial day and recent fall with abdominal pain.    The patient has been demonstrating delirium episode superimposed on anxiety, depression and alcohol abuse with some improvement.    5/31/2025: patient continues to demonstrate alternation in mood and cognition. Patient aaox3 this morning according to RN. Patient otherwise anxious and restless and refusing to answer questions stating he needs water. Patient NPO.     6/2/2025: The patient continued demonstrating alternation in his mood, cognition, restlessness, tremulousness, tachycardic with slight fluctuation in the blood pressure.    63 2025: The patient has demonstrating significant improvement in his mood and demeanor otherwise continue to be somewhat defensive and some odd behavior at time but no withdrawal symptom.    Discussed risk and benefit, acknowledging the current symptom and severity.  At this point, I would recommend the following approach:     Focus on safety  Focus on education and support.  Focus on insight orientation helping the patient understand diagnosis and treatment plan.  Started Klonopin 0.5 mg nightly.  Start Zyprexa 2.5 mg nightly.  Start Lexapro 5 mg nightly.  Change lorazepam to 0.5 mg twice daily as needed orally for anxiety.  Discontinue mirtazapine.  Continue thiamine 200 mg IV daily.  Processed with patient at length, the initiation of the above psychotropic medications I advised the patient of the risks, benefits, alternatives and potential side effects. The patient consents to administration of the medications and understands the right to refuse medications at any time. The  patient verbalized understanding.   Coordinate plan with team    Orders This Visit:  Orders Placed This Encounter   Procedures    CBC With Differential With Platelet    Comp Metabolic Panel (14)    Hemoglobin    Prothrombin Time (PT)    PTT, Activated    Comp Metabolic Panel (14)    CBC With Differential With Platelet    Prothrombin Time (PT)    Hemoglobin & Hematocrit    Basic Metabolic Panel (8)    Comp Metabolic Panel (14)    Arterial blood gas    Hemoglobin    CBC With Differential With Platelet    Comp Metabolic Panel (14)    Expanded Arterial Blood Gas    CBC With Differential With Platelet    Triglycerides    Hemoglobin A1C    CBC With Differential With Platelet    Basic Metabolic Panel (8)    CBC With Differential With Platelet    Basic Metabolic Panel (8)    Basic Metabolic Panel (8)    TSH and Free T4    Vitamin B12    Magnesium    Potassium    Magnesium    Hemoglobin & Hematocrit    Basic Metabolic Panel (8)    CBC With Differential With Platelet    Arterial blood gas    Potassium    CBC, Platelet; No Differential    Urinalysis with Culture Reflex    Magnesium    Phosphorus    Creatinine, Serum    Potassium    Basic Metabolic Panel (8)    CBC, Platelet; No Differential    Platelet Count    PTT, Activated    PTT, Activated    PTT, Activated    PTT, Activated    Basic Metabolic Panel (8)    PTT, Activated    PTT, Activated    PTT, Activated    Potassium    Basic Metabolic Panel (8)    Phosphorus    Renal Function Panel    Magnesium    Creatinine, Urine, Random    Sodium, Urine, Random    Urinalysis, Routine    Basic Metabolic Panel (8)    PTT, Activated    Type and screen    ABORH (Blood Type)    Antibody Screen    ABORH Confirmation    Prepare RBC Once    Prepare RBC Once    Prepare RBC STAT    Specimen to Pathology Tissue    MRSA Screen by PCR    Blood Culture    Urine Culture, Routine       Meds This Visit:  Requested Prescriptions      No prescriptions requested or ordered in this encounter     Nicola  MD Bertha  6/3/2025    Note to Patient: The 21st Century Cures Act makes medical notes like these available to patients in the interest of transparency. However, be advised this is a medical document. It is intended as peer to peer communication. It is written in medical language and may contain abbreviations or verbiage that are unfamiliar. It may appear blunt or direct. Medical documents are intended to carry relevant information, facts as evident, and the clinical opinion of the practitioner. This note may have been transcribed using a voice dictation system. Voice recognition errors may occur. This should not be taken to alter the content or meaning of this note.            [1]   Past Medical History:   A-fib (HCC)    CVA (cerebral vascular accident) (HCC)    Diabetes (HCC)    Diverticulosis of large intestine    Scattered pandiverticulosis   [2]   Past Surgical History:  Procedure Laterality Date    Colonoscopy N/A 05/22/2025    EGD/COLONOSCOPY;  Surgeon: Cesar Byrne MD;  Location: [facility] ENDOSCOPY   [3] No family history on file.  [4]   Social History  Socioeconomic History    Marital status:    Tobacco Use    Smoking status: Former     Types: Cigarettes     Passive exposure: Never    Smokeless tobacco: Never   Vaping Use    Vaping status: Never Used   Substance and Sexual Activity    Alcohol use: Yes     Alcohol/week: 6.0 - 7.0 standard drinks of alcohol     Types: 3 - 4 Standard drinks or equivalent, 3 Cans of beer per week     Social Drivers of Health     Food Insecurity: Patient Unable To Answer (5/28/2025)    NCSS - Food Insecurity     Worried About Running Out of Food in the Last Year: Patient unable to answer     Ran Out of Food in the Last Year: Patient unable to answer   Transportation Needs: Patient Unable To Answer (5/28/2025)    NCSS - Transportation     Lack of Transportation: Patient unable to answer   Housing Stability: Patient Unable To Answer (5/28/2025)    NCSS - Housing/Utilities      Has Housing: Patient unable to answer     Worried About Losing Housing: Patient unable to answer     Unable to Get Utilities: Patient unable to answer   [5]   Current Facility-Administered Medications   Medication Dose Route Frequency    dextrose 5% infusion   Intravenous Continuous    LORazepam (Ativan) 2 mg/mL injection 0.5 mg  0.5 mg Intravenous TID    mirtazapine (Remeron SolTab) disintegrating tab 15 mg  15 mg Oral Nightly    thiamine 100 mg/mL injection 100 mg  100 mg Intravenous Daily    cefTRIAXone (Rocephin) 2 g in sodium chloride 0.9% 100 mL IVPB-ADDV  2 g Intravenous Q24H    heparin (Porcine) 41672 units/250mL infusion ACS/AFIB CONTINUOUS  200-3,000 Units/hr Intravenous Continuous    LORazepam (Ativan) 2 mg/mL injection 0.5 mg  0.5 mg Intravenous Q6H PRN    dilTIAZem (cardIZEM) tab 90 mg  90 mg Oral 4 times per day    ergocalciferol (Vitamin D2) cap 50,000 Units  50,000 Units Oral Weekly    ipratropium-albuterol (Duoneb) 0.5-2.5 (3) MG/3ML inhalation solution 3 mL  3 mL Nebulization Q6H PRN    insulin aspart (NovoLOG) 100 Units/mL FlexPen 1-5 Units  1-5 Units Subcutaneous TID CC    LORazepam (Ativan) 2 mg/mL injection 0.5 mg  0.5 mg Intravenous Daily PRN    dilTIAZem 10 mg BOLUS FROM BAG infusion  10 mg Intravenous Q1H PRN    dilTIAZem (cardIZEM) 100 mg in sodium chloride 0.9% 100 mL IVPB-ADDV  2.5-20 mg/hr Intravenous Continuous    metoprolol tartrate (Lopressor) tab 25 mg  25 mg Oral 2x Daily(Beta Blocker)    acetaminophen (Tylenol) tab 650 mg  650 mg Oral Q4H PRN    ondansetron (Zofran) 4 MG/2ML injection 4 mg  4 mg Intravenous Q6H PRN    metoclopramide (Reglan) 5 mg/mL injection 5 mg  5 mg Intravenous Q8H PRN    glucose (Dex4) 15 GM/59ML oral liquid 15 g  15 g Oral Q15 Min PRN    Or    glucose (Glutose) 40% oral gel 15 g  15 g Oral Q15 Min PRN    Or    glucose-vitamin C (Dex-4) chewable tab 4 tablet  4 tablet Oral Q15 Min PRN    Or    dextrose 50% injection 50 mL  50 mL Intravenous Q15 Min PRN     Or    glucose (Dex4) 15 GM/59ML oral liquid 30 g  30 g Oral Q15 Min PRN    Or    glucose (Glutose) 40% oral gel 30 g  30 g Oral Q15 Min PRN    Or    glucose-vitamin C (Dex-4) chewable tab 8 tablet  8 tablet Oral Q15 Min PRN   [6]   Medications Prior to Admission   Medication Sig    apixaban 5 MG Oral Tab Take 1 tablet (5 mg total) by mouth 2 (two) times daily.    diazePAM 2 MG Oral Tab Take 1 tablet (2 mg total) by mouth daily.    diazePAM 5 MG Oral Tab Take 1 tablet (5 mg total) by mouth at bedtime.    dilTIAZem 90 MG Oral Tab Take 2 tablets (180 mg total) by mouth 2 (two) times daily. HOLD if blood pressure <100 and heart rate <60    lisinopril 30 MG Oral Tab Take 1 tablet (30 mg total) by mouth before bedtime.    lisinopril 40 MG Oral Tab Take 1 tablet (40 mg total) by mouth in the morning.    metFORMIN 500 MG Oral Tab Take 1 tablet (500 mg total) by mouth 2 (two) times daily with meals.    metoprolol succinate ER 50 MG Oral Tablet 24 Hr Take 1 tablet (50 mg total) by mouth in the morning.    ergocalciferol 1.25 MG (11785 UT) Oral Cap Take 1 capsule (50,000 Units total) by mouth once a week. Taking dose on Fridays   [7]   Allergies  Allergen Reactions    Lisinopril ANGIOEDEMA     Per Dr. Fonseca, continue to hold Lisinopril as Pt is an unreliable historian & Pt experiences cardiopulmonary arrest in which angioedema from Lisinopril is suspected;      [[id number]]

## 2025-06-04 NOTE — PROGRESS NOTES
[facility]  part of [facility]    Cardiology Progress Note    Aldo Valdez Patient Status:  Inpatient    1945 MRN [id number]   Location [facility] 3W/SW Attending Edward Pa MD   Hosp Day # 13 PCP Wyatt Ross DO       Impression/Plan:  79 year old male presenting with:     1. Witnessed bradycardic CV arrest (25), +throat swelling- anaphylactic reaction? (unknown culprit medication)--> steroids/benadryl, holding lisinopril     2. pAF on apixaban--> GIB, Hg 6.4 s/p prbc transfusion--> AC held, EGD/C-scope 25: diverticulosis (likely cause), polyp s/p cautery/snare.     3. Aspiration PNA?     4.  UTI    5.  ZOE on CKD    6.  Hypernatremia      - TTE this admission with normal LV function, no significant valve disease  - Cont diltiazem gtt for rate control; resume po agents when patient able to take po  - Eliquis held 2/2 NPO status; currently on heparin drip  - ZOE on CKD, hyper Na as per nephro  - Monitor on tele  - Will follow     Subjective: No events overnight.  Today patient without acute complaints.    Problem List[1]    Objective:   Temp: 98.4 °F (36.9 °C)  Pulse: 94  Resp: 18  BP: 140/71    Intake/Output:     Intake/Output Summary (Last 24 hours) at 2025 0939  Last data filed at 2025 0056  Gross per 24 hour   Intake 1074.58 ml   Output 750 ml   Net 324.58 ml       Last 3 Weights   25 0520 147 lb 7.8 oz (66.9 kg)   25 0518 152 lb 12.5 oz (69.3 kg)   25 0500 154 lb 5.2 oz (70 kg)   25 0400 156 lb 8.4 oz (71 kg)   25 0200 151 lb 14.4 oz (68.9 kg)   25 2238 157 lb 6.5 oz (71.4 kg)   25 1426 180 lb (81.6 kg)   10/08/24 1853 180 lb (81.6 kg)   19 0857 188 lb (85.3 kg)       Tele: AF    Physical Exam:    General: Alert and oriented x 3. No apparent distress. No respiratory or constitutional distress.  HEENT: Normocephalic, anicteric sclera, neck supple, no thyromegaly or adenopathy.  Neck: No JVD, carotids 2+,  no bruits.  Cardiac: Irregularly irregular  Lungs: Clear without wheezes, rales, rhonchi or dullness.  Normal excursions and effort.  Abdomen: Soft, non-tender. No organosplenomegally, mass or rebound, BS-present.  Extremities: Without clubbing, cyanosis or edema.  Peripheral pulses are 2+.  Neurologic: Alert and oriented, normal affect. No motor or coordinational deficit.  Skin: Warm and dry.     Laboratory/Data:    Labs:         Recent Labs   Lab 06/01/25  0130 06/01/25  1041 06/02/25  0600 06/03/25  0542 06/04/25  0643   WBC 13.8* 13.6* 13.7* 11.9* 11.3*   HGB 7.6* 8.2* 7.6*  7.7* 8.5* 7.2*   MCV 84.9 83.7 83.1 84.2 81.8   .0 463.0* 520.0* 539.0* 516.0*  516.0*       Recent Labs   Lab 05/29/25  0425 05/30/25  0412 05/31/25  0449 06/01/25  0518 06/01/25  1041 06/02/25  0600 06/03/25  0542 06/03/25  1346 06/03/25  1938 06/04/25  0643    144   < >  --  147*  --  152* 152* 149* 148*  148*   K 3.2* 3.7  3.7   < >  --  3.6 3.6 3.4* 3.7 3.6 3.1*  3.1*    111   < >  --  114*  --  120* 118* 116* 116*  116*   CO2 23.0 25.0   < >  --  23.0  --  23.0 22.0 21.0 22.0  22.0   BUN 27* 26*   < >  --  26*  --  29* 28* 31* 25*  25*   CREATSERUM 1.26 1.23   < >  --  1.48*  --  1.54* 1.57* 1.60* 1.45*  1.45*   CA 8.0* 7.9*   < >  --  8.1*  --  8.1* 8.4* 8.0* 7.8*  7.8*   MG 1.8 2.1  --  2.0  --   --   --   --   --  1.8   PHOS  --   --   --  2.8  --   --   --   --   --  2.8   * 130*   < >  --  109*  --  132* 199* 226* 168*  168*    < > = values in this interval not displayed.       Recent Labs   Lab 05/29/25  0425 05/30/25  0412 06/04/25  0643   ALT 43 46  --    AST 42* 39*  --    ALB 3.3 3.4 2.9*       No results for input(s): \"TROP\" in the last 168 hours.    Allergies:   Allergies[2]    Medications:  Current Hospital Medications[3]    Kaiser Byrne MD  6/4/2025  9:40 AM         [1]   Patient Active Problem List  Diagnosis    Essential hypertension    Back pain    Pure hypercholesterolemia     Cerebral infarction involving posterior cerebral artery, right (HCC)    Anxiety    Acute gastrointestinal hemorrhage    Tremor    Mixed Alzheimer and vascular dementia (HCC)    Generalized anxiety disorder    Alcohol abuse, continuous    Episodic mood disorder    Delirium due to another medical condition    Goals of care, counseling/discussion    Advance care planning    Palliative care by specialist   [2]   Allergies  Allergen Reactions    Lisinopril ANGIOEDEMA     Per Dr. Fonseca, continue to hold Lisinopril as Pt is an unreliable historian & Pt experiences cardiopulmonary arrest in which angioedema from Lisinopril is suspected;   [3]   Current Facility-Administered Medications   Medication Dose Route Frequency    insulin aspart (NovoLOG) 100 Units/mL FlexPen 3 Units  3 Units Subcutaneous TID CC    magnesium sulfate in sterile water for injection 2 g/50mL IVPB premix 2 g  2 g Intravenous Once    potassium chloride 40 mEq in 250mL sodium chloride 0.9% IVPB premix  40 mEq Intravenous Once    dextrose 5% infusion   Intravenous Continuous    escitalopram (Lexapro) tab 5 mg  5 mg Oral Nightly    OLANZapine (ZyPREXA) tab 2.5 mg  2.5 mg Oral Nightly    clonazePAM (KlonoPIN) tab 0.5 mg  0.5 mg Oral Nightly    LORazepam (Ativan) tab 0.5 mg  0.5 mg Oral BID PRN    thiamine 100 mg/mL injection 100 mg  100 mg Intravenous Daily    cefTRIAXone (Rocephin) 2 g in sodium chloride 0.9% 100 mL IVPB-ADDV  2 g Intravenous Q24H    heparin (Porcine) 01213 units/250mL infusion ACS/AFIB CONTINUOUS  200-3,000 Units/hr Intravenous Continuous    LORazepam (Ativan) 2 mg/mL injection 0.5 mg  0.5 mg Intravenous Q6H PRN    dilTIAZem (cardIZEM) tab 90 mg  90 mg Oral 4 times per day    ergocalciferol (Vitamin D2) cap 50,000 Units  50,000 Units Oral Weekly    ipratropium-albuterol (Duoneb) 0.5-2.5 (3) MG/3ML inhalation solution 3 mL  3 mL Nebulization Q6H PRN    insulin aspart (NovoLOG) 100 Units/mL FlexPen 1-5 Units  1-5 Units Subcutaneous TID CC     dilTIAZem 10 mg BOLUS FROM BAG infusion  10 mg Intravenous Q1H PRN    dilTIAZem (cardIZEM) 100 mg in sodium chloride 0.9% 100 mL IVPB-ADDV  2.5-20 mg/hr Intravenous Continuous    metoprolol tartrate (Lopressor) tab 25 mg  25 mg Oral 2x Daily(Beta Blocker)    acetaminophen (Tylenol) tab 650 mg  650 mg Oral Q4H PRN    ondansetron (Zofran) 4 MG/2ML injection 4 mg  4 mg Intravenous Q6H PRN    glucose (Dex4) 15 GM/59ML oral liquid 15 g  15 g Oral Q15 Min PRN    Or    glucose (Glutose) 40% oral gel 15 g  15 g Oral Q15 Min PRN    Or    glucose-vitamin C (Dex-4) chewable tab 4 tablet  4 tablet Oral Q15 Min PRN    Or    dextrose 50% injection 50 mL  50 mL Intravenous Q15 Min PRN    Or    glucose (Dex4) 15 GM/59ML oral liquid 30 g  30 g Oral Q15 Min PRN    Or    glucose (Glutose) 40% oral gel 30 g  30 g Oral Q15 Min PRN    Or    glucose-vitamin C (Dex-4) chewable tab 8 tablet  8 tablet Oral Q15 Min PRN

## 2025-06-05 NOTE — PROGRESS NOTES
NEPHROLOGY DAILY PROGRESS NOTE       SUBJECTIVE:  Wants to rest, no complaints  Denies SOB        OBJECTIVE:    Total Intake/Output:    Intake/Output Summary (Last 24 hours) at 6/5/2025 1009  Last data filed at 6/5/2025 0653  Gross per 24 hour   Intake 320 ml   Output 950 ml   Net -630 ml         PHYSICAL EXAM:  /68 (BP Location: Right arm)   Pulse 69   Temp 97.7 °F (36.5 °C) (Axillary)   Resp 18   Ht 5' 9\" (1.753 m)   Wt 147 lb 7.8 oz (66.9 kg)   SpO2 95%   BMI 21.78 kg/m²   GEN: NAD  HEENT: NCAT    CHEST: CTA b/l  CARDIAC: S1S2 normal  EXT: no lower ext edema  NEURO: awake, alert        CURRENT MEDICATIONS:  Current Hospital Medications[1]    LABS:  Patient Labs Reviewed in Detail. Pertinent Labs as follows:  Recent Labs   Lab 06/03/25  1938 06/04/25  0643 06/04/25  1439 06/05/25  0732   * 168*  168*  --  164*   BUN 31* 25*  25*  --  18   CREATSERUM 1.60* 1.45*  1.45*  --  1.34*   EGFRCR 44* 50*  50*  --  55*   CA 8.0* 7.8*  7.8*  --  8.0*   * 148*  148* 147* 141   K 3.6 3.1*  3.1*  --  3.5  3.5   * 116*  116*  --  111   CO2 21.0 22.0  22.0  --  22.0     Recent Labs   Lab 05/31/25  0646 06/01/25  0130 06/02/25  0600 06/03/25  0542 06/04/25  0643   RBC 3.34*   < > 3.08* 3.30* 2.91*   HGB 8.5*   < > 7.6*  7.7* 8.5* 7.2*   HCT 27.9*   < > 25.2*  25.6* 27.8* 23.8*   MCV 83.5   < > 83.1 84.2 81.8   MCH 25.4*   < > 25.0* 25.8* 24.7*   MCHC 30.5*   < > 30.1* 30.6* 30.3*   RDW 16.8*   < > 17.2* 17.4* 17.4*   NEPRELIM 13.37*  --   --   --   --    WBC 15.5*   < > 13.7* 11.9* 11.3*   .0   < > 520.0* 539.0* 516.0*  516.0*    < > = values in this interval not displayed.           IMAGING:  No results found.     ASSESSMENT AND PLAN:   79 year old male with PMH sig for dementia CVA, Afib, HTN, DM2, CKD. Admitted with rectal bleeding, hospital course complicated by cardiac arrest. Nephrology consulted for hypernatremia and ZOE:     Hypernatremia:  - secondary to decreased PO  intake in the setting of NPO status  - total water deficit 2.97  - resolved. Stop D5W   - encourage PO intake of free water      ZOE on CKD:  - Baseline creatinine variable but more recently ~ 1.3  - thought to be pre-renal ZOE,  urine Na 58 - but receiving IVFs   - ZOE improved and creatinine back to baseline   - follow renal fxn and I/Os    - renally adjust medications  - No acute indication for RRT.    Hypokalemia  - replace PRN     Dw RN    Dispo: Nephrology will sign off. Thank you for the consult.      MD Little Romo - Nephrology          [1]   Current Facility-Administered Medications   Medication Dose Route Frequency    insulin aspart (NovoLOG) 100 Units/mL FlexPen 3 Units  3 Units Subcutaneous TID CC    escitalopram (Lexapro) tab 5 mg  5 mg Oral Nightly    OLANZapine (ZyPREXA) tab 2.5 mg  2.5 mg Oral Nightly    clonazePAM (KlonoPIN) tab 0.5 mg  0.5 mg Oral Nightly    LORazepam (Ativan) tab 0.5 mg  0.5 mg Oral BID PRN    thiamine 100 mg/mL injection 100 mg  100 mg Intravenous Daily    heparin (Porcine) 05328 units/250mL infusion ACS/AFIB CONTINUOUS  200-3,000 Units/hr Intravenous Continuous    LORazepam (Ativan) 2 mg/mL injection 0.5 mg  0.5 mg Intravenous Q6H PRN    dilTIAZem (cardIZEM) tab 90 mg  90 mg Oral 4 times per day    ergocalciferol (Vitamin D2) cap 50,000 Units  50,000 Units Oral Weekly    ipratropium-albuterol (Duoneb) 0.5-2.5 (3) MG/3ML inhalation solution 3 mL  3 mL Nebulization Q6H PRN    insulin aspart (NovoLOG) 100 Units/mL FlexPen 1-5 Units  1-5 Units Subcutaneous TID CC    dilTIAZem 10 mg BOLUS FROM BAG infusion  10 mg Intravenous Q1H PRN    dilTIAZem (cardIZEM) 100 mg in sodium chloride 0.9% 100 mL IVPB-ADDV  2.5-20 mg/hr Intravenous Continuous    metoprolol tartrate (Lopressor) tab 25 mg  25 mg Oral 2x Daily(Beta Blocker)    acetaminophen (Tylenol) tab 650 mg  650 mg Oral Q4H PRN    ondansetron (Zofran) 4 MG/2ML injection 4 mg  4 mg Intravenous Q6H PRN    glucose (Dex4) 15  GM/59ML oral liquid 15 g  15 g Oral Q15 Min PRN    Or    glucose (Glutose) 40% oral gel 15 g  15 g Oral Q15 Min PRN    Or    glucose-vitamin C (Dex-4) chewable tab 4 tablet  4 tablet Oral Q15 Min PRN    Or    dextrose 50% injection 50 mL  50 mL Intravenous Q15 Min PRN    Or    glucose (Dex4) 15 GM/59ML oral liquid 30 g  30 g Oral Q15 Min PRN    Or    glucose (Glutose) 40% oral gel 30 g  30 g Oral Q15 Min PRN    Or    glucose-vitamin C (Dex-4) chewable tab 8 tablet  8 tablet Oral Q15 Min PRN

## 2025-06-05 NOTE — SLP NOTE
SPEECH DAILY NOTE - INPATIENT    ASSESSMENT & PLAN   ASSESSMENT  PPE REQUIRED. THIS THERAPIST WORE GLOVES, DROPLET MASK, AND GOGGLES FOR DURATION OF EVALUATION. HANDS WASHED UPON ENTRANCE/EXIT.    SLP f/u for ongoing dysphagia tx/meal assessment per recommendations of puree/mildly thick liquids per upgrade on 6/3/25. RN reports pt tolerates diet and medication well with no overt clinical s/s aspiration. Pt denies any swallowing challenges.     Pt positioned upright in upright in bed. Pt afebrile, tolerating room air with oxygen status 97 prior to the start of oral trials. SLP reviewed aspiration precautions and safe swallowing compensatory strategies with the patient. Safe swallow guidelines remain written on the white board in purple. Diet recommendations remain on the whiteboard in the room. Patient's RN v/u. Provided 1:1 feed assistance, pt tolerates limited trials of puree and mildly thick liquids via open cup with no overt clinical signs/symptoms of aspiration. Pt declined further trials and requested to sleep. Oxygen status remained >95 t/o the entire session. Oral/buccal cavities clear of residue following all trials.     PLAN: SLP to sign off at this time secondary to pt tolerating least restrictive diet with no CSA.     Diet Recommendations - Solids: Puree  Diet Recommendations - Liquids: Nectar thick liquids/ Mildly thick    Compensatory Strategies Recommended:  (no straw)  Aspiration Precautions: Upright position, Slow rate, Small bites, Small sips  Medication Administration Recommendations: Whole in puree    Patient Experiencing Pain: No      Treatment Plan  Treatment Plan/Recommendations: No further inpatient SLP service warranted    Interdisciplinary Communication: Discussed with RN  Recommendations posted at bedside    GOALS  Goal #1 The patient will tolerate puree consistency and moderately thick liquids without overt signs or symptoms of aspiration with 100 % accuracy over 1-2 session(s).    No CSA  on current diet.       GOAL MET 6/03/25      Goal #2 The patient/family/caregiver will demonstrate understanding and implementation of aspiration precautions and swallow strategies independently over 1-2 session(s).    Swallowing precautions posted in room.    Goal met   Goal #3 The patient will tolerate trial upgrade of soft solids consistency and mildly thick/thin liquids without overt signs or symptoms of aspiration with 100 % accuracy over 1-2 session(s).    UPGRADED TO MILDLY-thick liquids. To f/u for safe tolerance; possible continued upgrade.   Goal partially met/discontinued   Goal #4 The patient will utilize compensatory strategies as outlined by  BSSE (clinical evaluation) including Slow rate, Small bites, Small sips, Multiple swallows, Alternate liquids/solids, No straws, Upright 90 degrees, Upright 90 degrees 30 mins after meal, Liquids via tsp amount only, Eliminate distractions, Feed patient with 1:1 assistance 100 % of the time across 2 sessions.    SLP fed Pt; strategies executed.    Goal met     FOLLOW UP  Follow Up Needed (Documentation Required): No  SLP Follow-up Date:  (n/a)  Duration: 0 weeks    Session: 3    If you have any questions, please contact BIGG Schultz M.S. CCC-SLP  Speech Language Pathologist  Phone Number [phone]

## 2025-06-05 NOTE — PROGRESS NOTES
[facility] Hospitalist Progress Note     CC: Hospital Follow up    PCP: Wyatt Ross DO       Assessment/Plan:     Principal Problem:    Acute gastrointestinal hemorrhage  Active Problems:    Tremor    Mixed Alzheimer and vascular dementia (HCC)    Generalized anxiety disorder    Alcohol abuse, continuous    Episodic mood disorder    Delirium due to another medical condition    Goals of care, counseling/discussion    Advance care planning    Palliative care by specialist    Patient is a 79-year-old male with history of Dementia, CVA, Atrial Fibrillation on Eliquis, Hypertension, type 2 Diabetes, CKD, who presents to the hospital for evaluation of rectal bleeding.  S/p EGD/C-scope 5/23/25.  Hospital course complicated by CODE BLUE was called for increased secretions with tongue swelling and bradycardia which led to asystolic arrest.  CPR was initiated and patient was intubated.  Likely cause was thought to be due to angioedema from lisinopril.  Extubated 5/25/25.  How with aspiration and Delirium which is multifactorial.  Complete treatment for aspiration PNA and hypernatremia. Hg has remained stable with plans to resume Eliquis for dc to [facility].     Presumed aspiration pneumonia over weekend   - Congestion improved  - Unasyn changed to ceftriaxone over concerns of possible urinary tract infection  - As patient clinically has improved on ceftriaxone now complete  - Lungs are now clear patient alert and oriented x 2-3   - Bedside swallow with modified diet   - Now on room air    Acute respiratory failure secondary to angioedema resolved   - Thought to be related to ACE inhibitor which is a home medication, I do not see that any was given while in the hospital  - Tongue swelling and secretions are now resolved  - Extubated as of 5/25 and currently on room air  - Completed steroids and Benadryl    Tremors , thought to be related to benzodiazepine withdrawal take scheduled Valium at home  - Unclear why he does  drink alcohol frequently as well  - seen by psych   - medications adjusted   - needs neuropsych eval as outpatient     Rectal bleeding   Acute blood loss anemia  - Hemoglobin now stable  - S/p EGD/C-scope 5/23/25 Colonoscopy with diverticulosis and large pedunculated polyp in the rectosigmoid region that was fully removed with hot cautery snare, bleeding was felt to be more likely diverticular without culprit lesion identified   - PPI IV   - if further bleeding may need IR involvement   - serial Hemoglobin monitoring has been stable   - Eliquis resumed 5/29/25 -hemoglobin has been improving  - heparin drip to Eliquis      Atrial fibrillation chronic  - As patient had been n.p.o. dilt drip was restarted at 10 if able to take oral can resume oral medications   - Chronically takes diltiazem 180 mg twice daily at home and Toprol 50 XL daily  - Inpatient regiment was 90 mg every 6  - Previous oral dose metoprolol tartrate 25 twice daily  - Also on diltiazem drip at 5  - Cardiology following  - Echo with EF 55-60%  - resume Eliquis 5/29/25 -now on heparin drip due to n.p.o. status cardiology following  - iv lasix as needed- ordered 1 time dose 5/31  - oral dilt on dc     DM  - A1c 5.9, ISS used while on steroids   - metformin on dc      ZOE on CKD  -Resolved    Hypernatremia revolved, then reoccurred while NPO   - nephrology consulted   - D5W complete   - trend bnp     Dementia-chronic could be Alzheimer's type versus alcohol induced  - resides at the [facility]-independent per report  - TSH, b12 wnl   - was in the process of moving [facility] in [place] prior to admission   Plan to go to subacute rehab then to memory care in [place]  - Homer Solomon requesting NANCY before this transition   - PT OT eval ongoing     Fluids: none  Diet: modified   DVT prophylaxis: Eliquis      Dispo: cardiac tele  Medically clear for subacute rehab     Code status: Full code    Questions/concerns were  discussed with patient and/or family by bedside.      Thank You,  Edward Pa MD     Hospitalist with [facility]     Subjective:     Sleepy but wakes up at answer questions.   Still wants water.   Tolerating modified diet this AM.     OBJECTIVE:    Blood pressure 143/72, pulse 77, temperature 97.7 °F (36.5 °C), temperature source Axillary, resp. rate 18, height 5' 9\" (1.753 m), weight 147 lb 7.8 oz (66.9 kg), SpO2 96%.    Temp:  [97.5 °F (36.4 °C)-98.7 °F (37.1 °C)] 97.7 °F (36.5 °C)  Pulse:  [69-85] 77  Resp:  [16-18] 18  BP: (113-155)/(68-84) 143/72  SpO2:  [95 %-98 %] 96 %      Intake/Output:    Intake/Output Summary (Last 24 hours) at 6/5/2025 1331  Last data filed at 6/5/2025 0653  Gross per 24 hour   Intake 120 ml   Output 950 ml   Net -830 ml       Last 3 Weights   06/04/25 0520 147 lb 7.8 oz (66.9 kg)   05/31/25 0518 152 lb 12.5 oz (69.3 kg)   05/28/25 0500 154 lb 5.2 oz (70 kg)   05/27/25 0400 156 lb 8.4 oz (71 kg)   05/25/25 0200 151 lb 14.4 oz (68.9 kg)   05/22/25 2238 157 lb 6.5 oz (71.4 kg)   05/22/25 1426 180 lb (81.6 kg)   10/08/24 1853 180 lb (81.6 kg)   12/18/19 0857 188 lb (85.3 kg)       Exam   Gen: No acute distress, alert and oriented x 2  Pulm: Good air entry no wheezes or congestion   CV: Heart with regular rate and rhythm  Abd: Abdomen soft, non-tender  MSK: no clubbing, no cyanosis    Data Review:       Labs:     Recent Labs   Lab 05/31/25  0646 06/01/25  0130 06/02/25  0600 06/03/25  0542 06/04/25  0643 06/05/25  0732   RBC 3.34*   < > 3.08* 3.30* 2.91*  --    HGB 8.5*   < > 7.6*  7.7* 8.5* 7.2* 7.2*   HCT 27.9*   < > 25.2*  25.6* 27.8* 23.8* 24.1*   MCV 83.5   < > 83.1 84.2 81.8  --    MCH 25.4*   < > 25.0* 25.8* 24.7*  --    MCHC 30.5*   < > 30.1* 30.6* 30.3*  --    RDW 16.8*   < > 17.2* 17.4* 17.4*  --    NEPRELIM 13.37*  --   --   --   --   --    WBC 15.5*   < > 13.7* 11.9* 11.3*  --    .0   < > 520.0* 539.0* 516.0*  516.0*  --     < > = values in this interval not  displayed.         Recent Labs   Lab 06/03/25  1938 06/04/25  0643 06/04/25  1439 06/05/25  0732   * 168*  168*  --  164*   BUN 31* 25*  25*  --  18   CREATSERUM 1.60* 1.45*  1.45*  --  1.34*   EGFRCR 44* 50*  50*  --  55*   CA 8.0* 7.8*  7.8*  --  8.0*   * 148*  148* 147* 141   K 3.6 3.1*  3.1*  --  3.5  3.5   * 116*  116*  --  111   CO2 21.0 22.0  22.0  --  22.0       Recent Labs   Lab 05/30/25  0412 06/04/25  0643   ALT 46  --    AST 39*  --    ALB 3.4 2.9*         Imaging:  No results found.        Meds:     Scheduled Medications[1]  Medication Infusions[2]  PRN Medications[3]           [1]    apixaban  5 mg Oral BID    insulin aspart  3 Units Subcutaneous TID CC    escitalopram  5 mg Oral Nightly    OLANZapine  2.5 mg Oral Nightly    clonazePAM  0.5 mg Oral Nightly    thiamine  100 mg Intravenous Daily    dilTIAZem  90 mg Oral 4 times per day    ergocalciferol  50,000 Units Oral Weekly    insulin aspart  1-5 Units Subcutaneous TID CC    metoprolol tartrate  25 mg Oral 2x Daily(Beta Blocker)   [2] [3]   LORazepam    LORazepam    ipratropium-albuterol    dilTIAZem    acetaminophen    ondansetron    glucose **OR** glucose **OR** glucose-vitamin C **OR** dextrose **OR** glucose **OR** glucose **OR** glucose-vitamin C

## 2025-06-05 NOTE — CM/SW NOTE
06/05/25 1200   Discharge disposition   Expected discharge disposition subacute   Post Acute Care Provider Edward Miranda  ([facility])   Additional Home Care/Hospice Provider   ([facility])   Discharge transportation [facility]     Notified by RN that pt is cleared for DC.  Confirmed w/ liaison Cece - they can accept pt today around 3PM.    SW attempted pt's Guardian Juanito updated via phone-  no answer. Left Vmail w/ update and SW's phone #. Requested Juanito contact SW via phone to confirm receipt of message.    RN Yulia and RN Sylvia updated.    [facility] updated via Aidin of plan for DC today.    SW spoke to [facility] w/ [facility] (max assist, AOX1) set for ETA 3PM. PCS completed in Epic - pt's RN to print w/ pt's AVS.      Report phone #: [phone]      PLAN: [facility] ETA 3PM, PCS done          MS SeraW, LSW [id number]

## 2025-06-05 NOTE — PLAN OF CARE
Problem: Diabetes/Glucose Control  Goal: Glucose maintained within prescribed range  Description: INTERVENTIONS:  - Monitor Blood Glucose as ordered  - Assess for signs and symptoms of hyperglycemia and hypoglycemia  - Administer ordered medications to maintain glucose within target range  - Assess barriers to adequate nutritional intake and initiate nutrition consult as needed  - Instruct patient on self management of diabetes  Outcome: Progressing     Problem: CARDIOVASCULAR - ADULT  Goal: Maintains optimal cardiac output and hemodynamic stability  Description: INTERVENTIONS:  - Monitor vital signs, rhythm, and trends  - Monitor for bleeding, hypotension and signs of decreased cardiac output  - Evaluate effectiveness of vasoactive medications to optimize hemodynamic stability  - Monitor arterial and/or venous puncture sites for bleeding and/or hematoma  - Assess quality of pulses, skin color and temperature  - Assess for signs of decreased coronary artery perfusion - ex. Angina  - Evaluate fluid balance, assess for edema, trend weights  Outcome: Progressing  Goal: Absence of cardiac arrhythmias or at baseline  Description: INTERVENTIONS:  - Continuous cardiac monitoring, monitor vital signs, obtain 12 lead EKG if indicated  - Evaluate effectiveness of antiarrhythmic and heart rate control medications as ordered  - Initiate emergency measures for life threatening arrhythmias  - Monitor electrolytes and administer replacement therapy as ordered  Outcome: Progressing     Problem: GASTROINTESTINAL - ADULT  Goal: Maintains or returns to baseline bowel function  Description: INTERVENTIONS:  - Assess bowel function  - Maintain adequate hydration with IV or PO as ordered and tolerated  - Evaluate effectiveness of GI medications  - Encourage mobilization and activity  - Obtain nutritional consult as needed  - Establish a toileting routine/schedule  - Consider collaborating with pharmacy to review patient's medication  profile  Outcome: Progressing     Problem: RESPIRATORY - ADULT  Goal: Achieves optimal ventilation and oxygenation  Description: INTERVENTIONS:  - Assess for changes in respiratory status  - Assess for changes in mentation and behavior  - Position to facilitate oxygenation and minimize respiratory effort  - Oxygen supplementation based on oxygen saturation or ABGs  - Provide Smoking Cessation handout, if applicable  - Encourage broncho-pulmonary hygiene including cough, deep breathe, Incentive Spirometry  - Assess the need for suctioning and perform as needed  - Assess and instruct to report SOB or any respiratory difficulty  - Respiratory Therapy support as indicated  - Manage/alleviate anxiety  - Monitor for signs/symptoms of CO2 retention  Outcome: Progressing     Problem: GENITOURINARY - ADULT  Goal: Absence of urinary retention  Description: INTERVENTIONS:  - Assess patient’s ability to void and empty bladder  - Monitor intake/output and perform bladder scan as needed  - Follow urinary retention protocol/standard of care  - Consider collaborating with pharmacy to review patient's medication profile  - Implement strategies to promote bladder emptying  Outcome: Progressing     Problem: METABOLIC/FLUID AND ELECTROLYTES - ADULT  Goal: Glucose maintained within prescribed range  Description: INTERVENTIONS:  - Monitor Blood Glucose as ordered  - Assess for signs and symptoms of hyperglycemia and hypoglycemia  - Administer ordered medications to maintain glucose within target range  - Assess barriers to adequate nutritional intake and initiate nutrition consult as needed  - Instruct patient on self management of diabetes  Outcome: Progressing  Goal: Electrolytes maintained within normal limits  Description: INTERVENTIONS:  - Monitor labs and rhythm and assess patient for signs and symptoms of electrolyte imbalances  - Administer electrolyte replacement as ordered  - Monitor response to electrolyte replacements,  including rhythm and repeat lab results as appropriate  - Fluid restriction as ordered  - Instruct patient on fluid and nutrition restrictions as appropriate  Outcome: Progressing  Goal: Hemodynamic stability and optimal renal function maintained  Description: INTERVENTIONS:  - Monitor labs and assess for signs and symptoms of volume excess or deficit  - Monitor intake, output and patient weight  - Monitor urine specific gravity, serum osmolarity and serum sodium as indicated or ordered  - Monitor response to interventions for patient's volume status, including labs, urine output, blood pressure (other measures as available)  - Encourage oral intake as appropriate  - Instruct patient on fluid and nutrition restrictions as appropriate  Outcome: Progressing     Problem: SKIN/TISSUE INTEGRITY - ADULT  Goal: Skin integrity remains intact  Description: INTERVENTIONS  - Assess and document risk factors for pressure ulcer development  - Assess and document skin integrity  - Monitor for areas of redness and/or skin breakdown  - Initiate interventions, skin care algorithm/standards of care as needed  Outcome: Progressing     Problem: MUSCULOSKELETAL - ADULT  Goal: Return mobility to safest level of function  Description: INTERVENTIONS:  - Assess patient stability and activity tolerance for standing, transferring and ambulating w/ or w/o assistive devices  - Assist with transfers and ambulation using safe patient handling equipment as needed  - Ensure adequate protection for wounds/incisions during mobilization  - Obtain PT/OT consults as needed  - Advance activity as appropriate  - Communicate ordered activity level and limitations with patient/family  Outcome: Progressing     Problem: NEUROLOGICAL - ADULT  Goal: Achieves stable or improved neurological status  Description: INTERVENTIONS  - Assess for and report changes in neurological status  - Initiate measures to prevent increased intracranial pressure  - Maintain blood  pressure and fluid volume within ordered parameters to optimize cerebral perfusion and minimize risk of hemorrhage  - Monitor temperature, glucose, and sodium. Initiate appropriate interventions as ordered  Outcome: Progressing     Problem: Patient Centered Care  Goal: Patient preferences are identified and integrated in the patient's plan of care  Description: Interventions:  - Provide timely, complete, and accurate information to patient/family  - Incorporate patient and family knowledge, values, beliefs, and cultural backgrounds into the planning and delivery of care  - Encourage patient/family to participate in care and decision-making at the level they choose  - Honor patient and family perspectives and choices  Outcome: Progressing

## 2025-06-05 NOTE — PLAN OF CARE
Problem: Diabetes/Glucose Control  Goal: Glucose maintained within prescribed range  Description: INTERVENTIONS:  - Monitor Blood Glucose as ordered  - Assess for signs and symptoms of hyperglycemia and hypoglycemia  - Administer ordered medications to maintain glucose within target range  - Assess barriers to adequate nutritional intake and initiate nutrition consult as needed  - Instruct patient on self management of diabetes  Outcome: Progressing     Problem: CARDIOVASCULAR - ADULT  Goal: Maintains optimal cardiac output and hemodynamic stability  Description: INTERVENTIONS:  - Monitor vital signs, rhythm, and trends  - Monitor for bleeding, hypotension and signs of decreased cardiac output  - Evaluate effectiveness of vasoactive medications to optimize hemodynamic stability  - Monitor arterial and/or venous puncture sites for bleeding and/or hematoma  - Assess quality of pulses, skin color and temperature  - Assess for signs of decreased coronary artery perfusion - ex. Angina  - Evaluate fluid balance, assess for edema, trend weights  Outcome: Progressing  Goal: Absence of cardiac arrhythmias or at baseline  Description: INTERVENTIONS:  - Continuous cardiac monitoring, monitor vital signs, obtain 12 lead EKG if indicated  - Evaluate effectiveness of antiarrhythmic and heart rate control medications as ordered  - Initiate emergency measures for life threatening arrhythmias  - Monitor electrolytes and administer replacement therapy as ordered  Outcome: Progressing     Problem: GASTROINTESTINAL - ADULT  Goal: Maintains or returns to baseline bowel function  Description: INTERVENTIONS:  - Assess bowel function  - Maintain adequate hydration with IV or PO as ordered and tolerated  - Evaluate effectiveness of GI medications  - Encourage mobilization and activity  - Obtain nutritional consult as needed  - Establish a toileting routine/schedule  - Consider collaborating with pharmacy to review patient's medication  profile  Outcome: Progressing     Problem: HEMATOLOGIC - ADULT  Goal: Maintains hematologic stability  Description: INTERVENTIONS  - Assess for signs and symptoms of bleeding or hemorrhage  - Monitor labs and vital signs for trends  - Administer supportive blood products/factors, fluids and medications as ordered and appropriate  - Administer supportive blood products/factors as ordered and appropriate  Outcome: Progressing  Goal: Free from bleeding injury  Description: (Example usage: patient with low platelets)  INTERVENTIONS:  - Avoid intramuscular injections, enemas and rectal medication administration  - Ensure safe mobilization of patient  - Hold pressure on venipuncture sites to achieve adequate hemostasis  - Assess for signs and symptoms of internal bleeding  - Monitor lab trends  - Patient is to report abnormal signs of bleeding to staff  - Avoid use of toothpicks and dental floss  - Use electric shaver for shaving  - Use soft bristle tooth brush  - Limit straining and forceful nose blowing  Outcome: Progressing     Problem: Patient Centered Care  Goal: Patient preferences are identified and integrated in the patient's plan of care  Description: Interventions:  - What would you like us to know as we care for you?   - Provide timely, complete, and accurate information to patient/family  - Incorporate patient and family knowledge, values, beliefs, and cultural backgrounds into the planning and delivery of care  - Encourage patient/family to participate in care and decision-making at the level they choose  - Honor patient and family perspectives and choices  Outcome: Progressing     Problem: Patient/Family Goals  Goal: Patient/Family Long Term Goal  Description: Patient's Long Term Goal:     Interventions:  -   - See additional Care Plan goals for specific interventions  Outcome: Progressing  Goal: Patient/Family Short Term Goal  Description: Patient's Short Term Goal:     Interventions:   -   - See additional  Care Plan goals for specific interventions  Outcome: Progressing     Problem: Delirium  Goal: Minimize duration of delirium  Description: Interventions:  - Encourage use of hearing aids, eye glasses  - Promote highest level of mobility daily  - Provide frequent reorientation  - Promote wakefulness i.e. lights on, blinds open  - Promote sleep, encourage patient's normal rest cycle i.e. lights off, TV off, minimize noise and interruptions  - Encourage family to assist in orientation and promotion of home routines  Outcome: Progressing     Problem: RESPIRATORY - ADULT  Goal: Achieves optimal ventilation and oxygenation  Description: INTERVENTIONS:  - Assess for changes in respiratory status  - Assess for changes in mentation and behavior  - Position to facilitate oxygenation and minimize respiratory effort  - Oxygen supplementation based on oxygen saturation or ABGs  - Provide Smoking Cessation handout, if applicable  - Encourage broncho-pulmonary hygiene including cough, deep breathe, Incentive Spirometry  - Assess the need for suctioning and perform as needed  - Assess and instruct to report SOB or any respiratory difficulty  - Respiratory Therapy support as indicated  - Manage/alleviate anxiety  - Monitor for signs/symptoms of CO2 retention  Outcome: Progressing     Problem: GENITOURINARY - ADULT  Goal: Absence of urinary retention  Description: INTERVENTIONS:  - Assess patient’s ability to void and empty bladder  - Monitor intake/output and perform bladder scan as needed  - Follow urinary retention protocol/standard of care  - Consider collaborating with pharmacy to review patient's medication profile  - Implement strategies to promote bladder emptying  Outcome: Progressing     Problem: METABOLIC/FLUID AND ELECTROLYTES - ADULT  Goal: Glucose maintained within prescribed range  Description: INTERVENTIONS:  - Monitor Blood Glucose as ordered  - Assess for signs and symptoms of hyperglycemia and hypoglycemia  -  Administer ordered medications to maintain glucose within target range  - Assess barriers to adequate nutritional intake and initiate nutrition consult as needed  - Instruct patient on self management of diabetes  Outcome: Progressing  Goal: Electrolytes maintained within normal limits  Description: INTERVENTIONS:  - Monitor labs and rhythm and assess patient for signs and symptoms of electrolyte imbalances  - Administer electrolyte replacement as ordered  - Monitor response to electrolyte replacements, including rhythm and repeat lab results as appropriate  - Fluid restriction as ordered  - Instruct patient on fluid and nutrition restrictions as appropriate  Outcome: Progressing  Goal: Hemodynamic stability and optimal renal function maintained  Description: INTERVENTIONS:  - Monitor labs and assess for signs and symptoms of volume excess or deficit  - Monitor intake, output and patient weight  - Monitor urine specific gravity, serum osmolarity and serum sodium as indicated or ordered  - Monitor response to interventions for patient's volume status, including labs, urine output, blood pressure (other measures as available)  - Encourage oral intake as appropriate  - Instruct patient on fluid and nutrition restrictions as appropriate  Outcome: Progressing     Problem: SKIN/TISSUE INTEGRITY - ADULT  Goal: Skin integrity remains intact  Description: INTERVENTIONS  - Assess and document risk factors for pressure ulcer development  - Assess and document skin integrity  - Monitor for areas of redness and/or skin breakdown  - Initiate interventions, skin care algorithm/standards of care as needed  Outcome: Progressing     Problem: MUSCULOSKELETAL - ADULT  Goal: Return mobility to safest level of function  Description: INTERVENTIONS:  - Assess patient stability and activity tolerance for standing, transferring and ambulating w/ or w/o assistive devices  - Assist with transfers and ambulation using safe patient handling  equipment as needed  - Ensure adequate protection for wounds/incisions during mobilization  - Obtain PT/OT consults as needed  - Advance activity as appropriate  - Communicate ordered activity level and limitations with patient/family  Outcome: Progressing     Problem: NEUROLOGICAL - ADULT  Goal: Achieves stable or improved neurological status  Description: INTERVENTIONS  - Assess for and report changes in neurological status  - Initiate measures to prevent increased intracranial pressure  - Maintain blood pressure and fluid volume within ordered parameters to optimize cerebral perfusion and minimize risk of hemorrhage  - Monitor temperature, glucose, and sodium. Initiate appropriate interventions as ordered  Outcome: Progressing     Pt alert and oriented X 2. Pt on room air. No complaints throughout the day. Safety precautions in place, call light within reach. Plan is for discharge.

## 2025-06-05 NOTE — PLAN OF CARE
Problem: Diabetes/Glucose Control  Goal: Glucose maintained within prescribed range  Description: INTERVENTIONS:  - Monitor Blood Glucose as ordered  - Assess for signs and symptoms of hyperglycemia and hypoglycemia  - Administer ordered medications to maintain glucose within target range  - Assess barriers to adequate nutritional intake and initiate nutrition consult as needed  - Instruct patient on self management of diabetes  Outcome: Progressing     Problem: CARDIOVASCULAR - ADULT  Goal: Maintains optimal cardiac output and hemodynamic stability  Description: INTERVENTIONS:  - Monitor vital signs, rhythm, and trends  - Monitor for bleeding, hypotension and signs of decreased cardiac output  - Evaluate effectiveness of vasoactive medications to optimize hemodynamic stability  - Monitor arterial and/or venous puncture sites for bleeding and/or hematoma  - Assess quality of pulses, skin color and temperature  - Assess for signs of decreased coronary artery perfusion - ex. Angina  - Evaluate fluid balance, assess for edema, trend weights  Outcome: Progressing  Goal: Absence of cardiac arrhythmias or at baseline  Description: INTERVENTIONS:  - Continuous cardiac monitoring, monitor vital signs, obtain 12 lead EKG if indicated  - Evaluate effectiveness of antiarrhythmic and heart rate control medications as ordered  - Initiate emergency measures for life threatening arrhythmias  - Monitor electrolytes and administer replacement therapy as ordered  Outcome: Progressing     Problem: GASTROINTESTINAL - ADULT  Goal: Maintains or returns to baseline bowel function  Description: INTERVENTIONS:  - Assess bowel function  - Maintain adequate hydration with IV or PO as ordered and tolerated  - Evaluate effectiveness of GI medications  - Encourage mobilization and activity  - Obtain nutritional consult as needed  - Establish a toileting routine/schedule  - Consider collaborating with pharmacy to review patient's medication  profile  Outcome: Progressing     Problem: HEMATOLOGIC - ADULT  Goal: Maintains hematologic stability  Description: INTERVENTIONS  - Assess for signs and symptoms of bleeding or hemorrhage  - Monitor labs and vital signs for trends  - Administer supportive blood products/factors, fluids and medications as ordered and appropriate  - Administer supportive blood products/factors as ordered and appropriate  Outcome: Progressing  Goal: Free from bleeding injury  Description: (Example usage: patient with low platelets)  INTERVENTIONS:  - Avoid intramuscular injections, enemas and rectal medication administration  - Ensure safe mobilization of patient  - Hold pressure on venipuncture sites to achieve adequate hemostasis  - Assess for signs and symptoms of internal bleeding  - Monitor lab trends  - Patient is to report abnormal signs of bleeding to staff  - Avoid use of toothpicks and dental floss  - Use electric shaver for shaving  - Use soft bristle tooth brush  - Limit straining and forceful nose blowing  Outcome: Progressing     Problem: Patient Centered Care  Goal: Patient preferences are identified and integrated in the patient's plan of care  Description: Interventions:  - What would you like us to know as we care for you?   - Provide timely, complete, and accurate information to patient/family  - Incorporate patient and family knowledge, values, beliefs, and cultural backgrounds into the planning and delivery of care  - Encourage patient/family to participate in care and decision-making at the level they choose  - Honor patient and family perspectives and choices  Outcome: Progressing     Problem: Patient/Family Goals  Goal: Patient/Family Long Term Goal  Description: Patient's Long Term Goal:     Interventions:  - See additional Care Plan goals for specific interventions  Outcome: Progressing  Goal: Patient/Family Short Term Goal  Description: Patient's Short Term Goal:     Interventions:   - See additional Care  Plan goals for specific interventions  Outcome: Progressing     Problem: Delirium  Goal: Minimize duration of delirium  Description: Interventions:  - Encourage use of hearing aids, eye glasses  - Promote highest level of mobility daily  - Provide frequent reorientation  - Promote wakefulness i.e. lights on, blinds open  - Promote sleep, encourage patient's normal rest cycle i.e. lights off, TV off, minimize noise and interruptions  - Encourage family to assist in orientation and promotion of home routines  Outcome: Progressing     Problem: RESPIRATORY - ADULT  Goal: Achieves optimal ventilation and oxygenation  Description: INTERVENTIONS:  - Assess for changes in respiratory status  - Assess for changes in mentation and behavior  - Position to facilitate oxygenation and minimize respiratory effort  - Oxygen supplementation based on oxygen saturation or ABGs  - Provide Smoking Cessation handout, if applicable  - Encourage broncho-pulmonary hygiene including cough, deep breathe, Incentive Spirometry  - Assess the need for suctioning and perform as needed  - Assess and instruct to report SOB or any respiratory difficulty  - Respiratory Therapy support as indicated  - Manage/alleviate anxiety  - Monitor for signs/symptoms of CO2 retention  Outcome: Progressing     Problem: GENITOURINARY - ADULT  Goal: Absence of urinary retention  Description: INTERVENTIONS:  - Assess patient’s ability to void and empty bladder  - Monitor intake/output and perform bladder scan as needed  - Follow urinary retention protocol/standard of care  - Consider collaborating with pharmacy to review patient's medication profile  - Implement strategies to promote bladder emptying  Outcome: Progressing     Problem: METABOLIC/FLUID AND ELECTROLYTES - ADULT  Goal: Glucose maintained within prescribed range  Description: INTERVENTIONS:  - Monitor Blood Glucose as ordered  - Assess for signs and symptoms of hyperglycemia and hypoglycemia  - Administer  ordered medications to maintain glucose within target range  - Assess barriers to adequate nutritional intake and initiate nutrition consult as needed  - Instruct patient on self management of diabetes  Outcome: Progressing  Goal: Electrolytes maintained within normal limits  Description: INTERVENTIONS:  - Monitor labs and rhythm and assess patient for signs and symptoms of electrolyte imbalances  - Administer electrolyte replacement as ordered  - Monitor response to electrolyte replacements, including rhythm and repeat lab results as appropriate  - Fluid restriction as ordered  - Instruct patient on fluid and nutrition restrictions as appropriate  Outcome: Progressing  Goal: Hemodynamic stability and optimal renal function maintained  Description: INTERVENTIONS:  - Monitor labs and assess for signs and symptoms of volume excess or deficit  - Monitor intake, output and patient weight  - Monitor urine specific gravity, serum osmolarity and serum sodium as indicated or ordered  - Monitor response to interventions for patient's volume status, including labs, urine output, blood pressure (other measures as available)  - Encourage oral intake as appropriate  - Instruct patient on fluid and nutrition restrictions as appropriate  Outcome: Progressing     Problem: SKIN/TISSUE INTEGRITY - ADULT  Goal: Skin integrity remains intact  Description: INTERVENTIONS  - Assess and document risk factors for pressure ulcer development  - Assess and document skin integrity  - Monitor for areas of redness and/or skin breakdown  - Initiate interventions, skin care algorithm/standards of care as needed  Outcome: Progressing     Problem: MUSCULOSKELETAL - ADULT  Goal: Return mobility to safest level of function  Description: INTERVENTIONS:  - Assess patient stability and activity tolerance for standing, transferring and ambulating w/ or w/o assistive devices  - Assist with transfers and ambulation using safe patient handling equipment as  needed  - Ensure adequate protection for wounds/incisions during mobilization  - Obtain PT/OT consults as needed  - Advance activity as appropriate  - Communicate ordered activity level and limitations with patient/family  Outcome: Progressing     Problem: NEUROLOGICAL - ADULT  Goal: Achieves stable or improved neurological status  Description: INTERVENTIONS  - Assess for and report changes in neurological status  - Initiate measures to prevent increased intracranial pressure  - Maintain blood pressure and fluid volume within ordered parameters to optimize cerebral perfusion and minimize risk of hemorrhage  - Monitor temperature, glucose, and sodium. Initiate appropriate interventions as ordered  Outcome: Progressing

## 2025-06-05 NOTE — PLAN OF CARE
Problem: Diabetes/Glucose Control  Goal: Glucose maintained within prescribed range  Description: INTERVENTIONS:  - Monitor Blood Glucose as ordered  - Assess for signs and symptoms of hyperglycemia and hypoglycemia  - Administer ordered medications to maintain glucose within target range  - Assess barriers to adequate nutritional intake and initiate nutrition consult as needed  - Instruct patient on self management of diabetes  Outcome: Progressing     Problem: CARDIOVASCULAR - ADULT  Goal: Maintains optimal cardiac output and hemodynamic stability  Description: INTERVENTIONS:  - Monitor vital signs, rhythm, and trends  - Monitor for bleeding, hypotension and signs of decreased cardiac output  - Evaluate effectiveness of vasoactive medications to optimize hemodynamic stability  - Monitor arterial and/or venous puncture sites for bleeding and/or hematoma  - Assess quality of pulses, skin color and temperature  - Assess for signs of decreased coronary artery perfusion - ex. Angina  - Evaluate fluid balance, assess for edema, trend weights  Outcome: Progressing  Goal: Absence of cardiac arrhythmias or at baseline  Description: INTERVENTIONS:  - Continuous cardiac monitoring, monitor vital signs, obtain 12 lead EKG if indicated  - Evaluate effectiveness of antiarrhythmic and heart rate control medications as ordered  - Initiate emergency measures for life threatening arrhythmias  - Monitor electrolytes and administer replacement therapy as ordered  Outcome: Progressing     Problem: GASTROINTESTINAL - ADULT  Goal: Maintains or returns to baseline bowel function  Description: INTERVENTIONS:  - Assess bowel function  - Maintain adequate hydration with IV or PO as ordered and tolerated  - Evaluate effectiveness of GI medications  - Encourage mobilization and activity  - Obtain nutritional consult as needed  - Establish a toileting routine/schedule  - Consider collaborating with pharmacy to review patient's medication  profile  Outcome: Progressing     Problem: HEMATOLOGIC - ADULT  Goal: Maintains hematologic stability  Description: INTERVENTIONS  - Assess for signs and symptoms of bleeding or hemorrhage  - Monitor labs and vital signs for trends  - Administer supportive blood products/factors, fluids and medications as ordered and appropriate  - Administer supportive blood products/factors as ordered and appropriate  Outcome: Progressing  Goal: Free from bleeding injury  Description: (Example usage: patient with low platelets)  INTERVENTIONS:  - Avoid intramuscular injections, enemas and rectal medication administration  - Ensure safe mobilization of patient  - Hold pressure on venipuncture sites to achieve adequate hemostasis  - Assess for signs and symptoms of internal bleeding  - Monitor lab trends  - Patient is to report abnormal signs of bleeding to staff  - Avoid use of toothpicks and dental floss  - Use electric shaver for shaving  - Use soft bristle tooth brush  - Limit straining and forceful nose blowing  Outcome: Progressing     Problem: RESPIRATORY - ADULT  Goal: Achieves optimal ventilation and oxygenation  Description: INTERVENTIONS:  - Assess for changes in respiratory status  - Assess for changes in mentation and behavior  - Position to facilitate oxygenation and minimize respiratory effort  - Oxygen supplementation based on oxygen saturation or ABGs  - Provide Smoking Cessation handout, if applicable  - Encourage broncho-pulmonary hygiene including cough, deep breathe, Incentive Spirometry  - Assess the need for suctioning and perform as needed  - Assess and instruct to report SOB or any respiratory difficulty  - Respiratory Therapy support as indicated  - Manage/alleviate anxiety  - Monitor for signs/symptoms of CO2 retention  Outcome: Progressing     Problem: GENITOURINARY - ADULT  Goal: Absence of urinary retention  Description: INTERVENTIONS:  - Assess patient’s ability to void and empty bladder  - Monitor  intake/output and perform bladder scan as needed  - Follow urinary retention protocol/standard of care  - Consider collaborating with pharmacy to review patient's medication profile  - Implement strategies to promote bladder emptying  Outcome: Progressing     Problem: METABOLIC/FLUID AND ELECTROLYTES - ADULT  Goal: Glucose maintained within prescribed range  Description: INTERVENTIONS:  - Monitor Blood Glucose as ordered  - Assess for signs and symptoms of hyperglycemia and hypoglycemia  - Administer ordered medications to maintain glucose within target range  - Assess barriers to adequate nutritional intake and initiate nutrition consult as needed  - Instruct patient on self management of diabetes  Outcome: Progressing  Goal: Electrolytes maintained within normal limits  Description: INTERVENTIONS:  - Monitor labs and rhythm and assess patient for signs and symptoms of electrolyte imbalances  - Administer electrolyte replacement as ordered  - Monitor response to electrolyte replacements, including rhythm and repeat lab results as appropriate  - Fluid restriction as ordered  - Instruct patient on fluid and nutrition restrictions as appropriate  Outcome: Progressing  Goal: Hemodynamic stability and optimal renal function maintained  Description: INTERVENTIONS:  - Monitor labs and assess for signs and symptoms of volume excess or deficit  - Monitor intake, output and patient weight  - Monitor urine specific gravity, serum osmolarity and serum sodium as indicated or ordered  - Monitor response to interventions for patient's volume status, including labs, urine output, blood pressure (other measures as available)  - Encourage oral intake as appropriate  - Instruct patient on fluid and nutrition restrictions as appropriate  Outcome: Progressing     Problem: SKIN/TISSUE INTEGRITY - ADULT  Goal: Skin integrity remains intact  Description: INTERVENTIONS  - Assess and document risk factors for pressure ulcer development  -  Assess and document skin integrity  - Monitor for areas of redness and/or skin breakdown  - Initiate interventions, skin care algorithm/standards of care as needed  Outcome: Progressing     Problem: MUSCULOSKELETAL - ADULT  Goal: Return mobility to safest level of function  Description: INTERVENTIONS:  - Assess patient stability and activity tolerance for standing, transferring and ambulating w/ or w/o assistive devices  - Assist with transfers and ambulation using safe patient handling equipment as needed  - Ensure adequate protection for wounds/incisions during mobilization  - Obtain PT/OT consults as needed  - Advance activity as appropriate  - Communicate ordered activity level and limitations with patient/family  Outcome: Progressing     Problem: NEUROLOGICAL - ADULT  Goal: Achieves stable or improved neurological status  Description: INTERVENTIONS  - Assess for and report changes in neurological status  - Initiate measures to prevent increased intracranial pressure  - Maintain blood pressure and fluid volume within ordered parameters to optimize cerebral perfusion and minimize risk of hemorrhage  - Monitor temperature, glucose, and sodium. Initiate appropriate interventions as ordered  Outcome: Progressing     Problem: Patient Centered Care  Goal: Patient preferences are identified and integrated in the patient's plan of care  Description: Interventions:  - Provide timely, complete, and accurate information to patient/family  - Incorporate patient and family knowledge, values, beliefs, and cultural backgrounds into the planning and delivery of care  - Encourage patient/family to participate in care and decision-making at the level they choose  - Honor patient and family perspectives and choices  Outcome: Progressing

## 2025-06-05 NOTE — PROGRESS NOTES
[facility]  part of [facility]    Cardiology Progress Note    Aldo Valdez Patient Status:  Inpatient    1945 MRN [id number]   Location [facility] 3W/SW Attending Edward Pa MD   Hosp Day # 14 PCP Wyatt Ross DO       Impression/Plan:  79 year old male presenting with:     1. Witnessed bradycardic CV arrest (25), +throat swelling- anaphylactic reaction? (unknown culprit medication)--> steroids/benadryl, holding lisinopril     2. pAF on apixaban--> GIB, Hg 6.4 s/p prbc transfusion--> AC held, EGD/C-scope 25: diverticulosis (likely cause), polyp s/p cautery/snare.     3. Aspiration PNA?     4.  UTI    5.  ZOE on CKD    6.  Hypernatremia      - TTE this admission with normal LV function, no significant valve disease  - back on oral rate control meds  - Eliquis held still; currently on heparin drip. Change when able.   - ZOE on CKD, hyper Na as per nephro  - Monitor on tele  - Will follow     Subjective:     Sleepy for me this morning.        Problem List[1]    Objective:   Temp: 97.5 °F (36.4 °C)  Pulse: 84  Resp: 17  BP: 154/72    Intake/Output:     Intake/Output Summary (Last 24 hours) at 2025 0730  Last data filed at 2025 0653  Gross per 24 hour   Intake 440 ml   Output 950 ml   Net -510 ml       Last 3 Weights   25 0520 147 lb 7.8 oz (66.9 kg)   25 0518 152 lb 12.5 oz (69.3 kg)   25 0500 154 lb 5.2 oz (70 kg)   25 0400 156 lb 8.4 oz (71 kg)   25 0200 151 lb 14.4 oz (68.9 kg)   25 2238 157 lb 6.5 oz (71.4 kg)   25 1426 180 lb (81.6 kg)   10/08/24 1853 180 lb (81.6 kg)   19 0857 188 lb (85.3 kg)       Tele: AF    Physical Exam:    General: Resting comfortably  HEENT: Normocephalic, anicteric sclera  Cardiac: Irregularly irregular  Lungs: Clear without wheezes, rales, rhonchi or dullness.  Normal excursions and effort.  Abdomen: Soft, non-distended  Extremities: no edema  Neurologic: Resting comfortably  Skin:  Warm and dry.     Laboratory/Data:    Labs:         Recent Labs   Lab 06/01/25  0130 06/01/25  1041 06/02/25  0600 06/03/25  0542 06/04/25  0643   WBC 13.8* 13.6* 13.7* 11.9* 11.3*   HGB 7.6* 8.2* 7.6*  7.7* 8.5* 7.2*   MCV 84.9 83.7 83.1 84.2 81.8   .0 463.0* 520.0* 539.0* 516.0*  516.0*       Recent Labs   Lab 05/30/25  0412 05/31/25  0449 06/01/25  0518 06/01/25  1041 06/02/25  0600 06/03/25  0542 06/03/25  1346 06/03/25  1938 06/04/25  0643 06/04/25  1439      < >  --  147*  --  152* 152* 149* 148*  148* 147*   K 3.7  3.7   < >  --  3.6 3.6 3.4* 3.7 3.6 3.1*  3.1*  --       < >  --  114*  --  120* 118* 116* 116*  116*  --    CO2 25.0   < >  --  23.0  --  23.0 22.0 21.0 22.0  22.0  --    BUN 26*   < >  --  26*  --  29* 28* 31* 25*  25*  --    CREATSERUM 1.23   < >  --  1.48*  --  1.54* 1.57* 1.60* 1.45*  1.45*  --    CA 7.9*   < >  --  8.1*  --  8.1* 8.4* 8.0* 7.8*  7.8*  --    MG 2.1  --  2.0  --   --   --   --   --  1.8  --    PHOS  --   --  2.8  --   --   --   --   --  2.8  --    *   < >  --  109*  --  132* 199* 226* 168*  168*  --     < > = values in this interval not displayed.       Recent Labs   Lab 05/30/25  0412 06/04/25  0643   ALT 46  --    AST 39*  --    ALB 3.4 2.9*       No results for input(s): \"TROP\" in the last 168 hours.      ECHO:  1. Left ventricle: The cavity size was normal. Wall thickness was normal.      Systolic function was normal. The estimated ejection fraction was 55-60%,      by biplane method of disks. Wall motion is normal; there are no regional      wall motion abnormalities. Unable to assess LV diastolic function due to      heart rhythm.   2. Right ventricle: The cavity size was normal. Systolic function was      normal.   3. Left atrium: The atrial volume was mildly increased.   4. Aortic valve: There was thickening, consistent with sclerosis.   Impressions:  No previous study was available for comparison.           Allergies:    Allergies[2]    Medications:  Current Hospital Medications[3]             [1]   Patient Active Problem List  Diagnosis    Essential hypertension    Back pain    Pure hypercholesterolemia    Cerebral infarction involving posterior cerebral artery, right (HCC)    Anxiety    Acute gastrointestinal hemorrhage    Tremor    Mixed Alzheimer and vascular dementia (HCC)    Generalized anxiety disorder    Alcohol abuse, continuous    Episodic mood disorder    Delirium due to another medical condition    Goals of care, counseling/discussion    Advance care planning    Palliative care by specialist   [2]   Allergies  Allergen Reactions    Lisinopril ANGIOEDEMA     Per Dr. Fonseca, continue to hold Lisinopril as Pt is an unreliable historian & Pt experiences cardiopulmonary arrest in which angioedema from Lisinopril is suspected;   [3]   Current Facility-Administered Medications   Medication Dose Route Frequency    insulin aspart (NovoLOG) 100 Units/mL FlexPen 3 Units  3 Units Subcutaneous TID CC    dextrose 5% infusion   Intravenous Continuous    escitalopram (Lexapro) tab 5 mg  5 mg Oral Nightly    OLANZapine (ZyPREXA) tab 2.5 mg  2.5 mg Oral Nightly    clonazePAM (KlonoPIN) tab 0.5 mg  0.5 mg Oral Nightly    LORazepam (Ativan) tab 0.5 mg  0.5 mg Oral BID PRN    thiamine 100 mg/mL injection 100 mg  100 mg Intravenous Daily    heparin (Porcine) 79226 units/250mL infusion ACS/AFIB CONTINUOUS  200-3,000 Units/hr Intravenous Continuous    LORazepam (Ativan) 2 mg/mL injection 0.5 mg  0.5 mg Intravenous Q6H PRN    dilTIAZem (cardIZEM) tab 90 mg  90 mg Oral 4 times per day    ergocalciferol (Vitamin D2) cap 50,000 Units  50,000 Units Oral Weekly    ipratropium-albuterol (Duoneb) 0.5-2.5 (3) MG/3ML inhalation solution 3 mL  3 mL Nebulization Q6H PRN    insulin aspart (NovoLOG) 100 Units/mL FlexPen 1-5 Units  1-5 Units Subcutaneous TID CC    dilTIAZem 10 mg BOLUS FROM BAG infusion  10 mg Intravenous Q1H PRN    dilTIAZem (cardIZEM) 100  mg in sodium chloride 0.9% 100 mL IVPB-ADDV  2.5-20 mg/hr Intravenous Continuous    metoprolol tartrate (Lopressor) tab 25 mg  25 mg Oral 2x Daily(Beta Blocker)    acetaminophen (Tylenol) tab 650 mg  650 mg Oral Q4H PRN    ondansetron (Zofran) 4 MG/2ML injection 4 mg  4 mg Intravenous Q6H PRN    glucose (Dex4) 15 GM/59ML oral liquid 15 g  15 g Oral Q15 Min PRN    Or    glucose (Glutose) 40% oral gel 15 g  15 g Oral Q15 Min PRN    Or    glucose-vitamin C (Dex-4) chewable tab 4 tablet  4 tablet Oral Q15 Min PRN    Or    dextrose 50% injection 50 mL  50 mL Intravenous Q15 Min PRN    Or    glucose (Dex4) 15 GM/59ML oral liquid 30 g  30 g Oral Q15 Min PRN    Or    glucose (Glutose) 40% oral gel 30 g  30 g Oral Q15 Min PRN    Or    glucose-vitamin C (Dex-4) chewable tab 8 tablet  8 tablet Oral Q15 Min PRN

## 2025-06-05 NOTE — DISCHARGE SUMMARY
General Medicine Discharge Summary     Patient ID:  Aldo Valdez  79 year old  6/6/1945    Admit date: 5/22/2025    Discharge date and time: 6/5/25    Attending Physician: Edward Pa MD     Consults: IP CONSULT TO GASTROENTEROLOGY  IP CONSULT TO PULMONOLOGY  IP CONSULT TO CARDIOLOGY  IP CONSULT TO NEUROLOGY  NURSING CONSULT TO DIETITIAN  IP CONSULT TO PSYCHIATRY  IP CONSULT TO NEPHROLOGY  IP CONSULT PALLIATIVE CARE  IP CONSULT TO SOCIAL WORK    Primary Care Physician: Wyatt Ross DO     Reason for admission: GI Bleed     Risk For Readmission: low    Discharge Diagnoses: Acute gastrointestinal hemorrhage [K92.2]  See Additional Discharge Diagnoses in Hospital Course    Discharged Condition: stable    Follow-up with labs/images appointments: PCP    HPI: per chart  This is a 79-year-old male with history of dementia, stroke, atrial fibrillation on Eliquis, hypertension, type 2 diabetes, who presents to the hospital for evaluation of rectal bleeding.  The patient states he was having diarrhea the last few days.  He states today however the stool was watery with blood.  He states he was not having rectal bleeding yesterday.  Patient arrived here via EMS.  Patient noted to have a hemoglobin of 6.4.  He is started on rapid blood transfusion and reversal of his anticoagulation.     Hospital Course:   Patient is a 79-year-old male with history of Dementia, CVA, Atrial Fibrillation on Eliquis, Hypertension, type 2 Diabetes, CKD, who presents to the hospital for evaluation of rectal bleeding.  S/p EGD/C-scope 5/23/25.  Hospital course complicated by CODE BLUE was called for increased secretions with tongue swelling and bradycardia which led to asystolic arrest.  CPR was initiated and patient was intubated.  Likely cause was thought to be due to angioedema from lisinopril.  Extubated 5/25/25.  How with aspiration and Delirium which is multifactorial.  Complete treatment for aspiration PNA and hypernatremia. Hg has  remained stable with plans to resume Eliquis for dc to [facility].      Presumed aspiration pneumonia over weekend   - Congestion improved  - Unasyn changed to ceftriaxone over concerns of possible urinary tract infection  - As patient clinically has improved on ceftriaxone now complete  - Lungs are now clear patient alert and oriented x 2-3   - Bedside swallow with modified diet   - Now on room air     Acute respiratory failure secondary to angioedema resolved   - Thought to be related to ACE inhibitor which is a home medication, I do not see that any was given while in the hospital  - Tongue swelling and secretions are now resolved  - Extubated as of 5/25 and currently on room air  - Completed steroids and Benadryl     Tremors , thought to be related to benzodiazepine withdrawal take scheduled Valium at home  - Unclear if he does drink alcohol frequently as well  - seen by psych   - medications adjusted   - needs neuropsych eval as outpatient      Rectal bleeding   Acute blood loss anemia  - Hemoglobin now stable  - S/p EGD/C-scope 5/23/25 Colonoscopy with diverticulosis and large pedunculated polyp in the rectosigmoid region that was fully removed with hot cautery snare, bleeding was felt to be more likely diverticular without culprit lesion identified   - PPI IV   - if further bleeding may need IR involvement   - serial Hemoglobin monitoring has been stable   - Eliquis resumed 5/29/25 - hemoglobin has been improving  - heparin drip to Eliquis      Atrial fibrillation chronic  - dilt drip complete   - Chronically takes diltiazem 180 mg twice daily at home and Toprol 50 XL daily  - Inpatient regiment was 90 mg every 6  - Previous oral dose metoprolol tartrate 25 twice daily  - Cardiology following  - Echo with EF 55-60%  - resume Eliquis 5/29/25 - then back on heparin drip due to n.p.o.  - iv lasix as needed- ordered 1 time dose 5/31  - oral dilt and eliquis on dc      DM  - A1c 5.9, ISS used while on steroids   -  metformin on dc      ZOE on CKD  - Resolved     Hypernatremia revolved, then reoccurred while NPO   - nephrology consulted   - D5W complete   - Na stable      Dementia-chronic could be Alzheimer's type versus alcohol induced  - resides at the [facility]-independent per report  - TSH, b12 wnl   - was in the process of moving [facility] in [place] prior to admission   Plan to go to subacute rehab then to memory care in [place]  - Guardian Juanito requesting [facility] before this transition       Operative Procedures: Procedure(s) (LRB):  COLONOSCOPY (N/A)  ESOPHAGOGASTRODUODENOSCOPY (EGD) (N/A)     Imaging: No results found.    Disposition: [facility]    Activity: as tolerated   Diet: modified   Wound Care: no needs  Code Status: Full Code  O2: no needs    Home Medication Changes: as below   All discharge medications have been reconciled with current medication list.   > 30 minutes spent on dc     Med list     Medication List        START taking these medications      clonazePAM 0.5 MG Tabs  Commonly known as: KlonoPIN  Take 1 tablet (0.5 mg total) by mouth nightly.     escitalopram 5 MG Tabs  Commonly known as: Lexapro  Take 1 tablet (5 mg total) by mouth nightly.     folic acid 1 MG Tabs  Commonly known as: Folvite  Take 1 tablet (1 mg total) by mouth daily.     OLANZapine 2.5 MG Tabs  Commonly known as: ZyPREXA  Take 1 tablet (2.5 mg total) by mouth nightly.     pantoprazole 40 MG Tbec  Commonly known as: Protonix  Take 1 tablet (40 mg total) by mouth daily.     thiamine 100 MG Tabs  Commonly known as: Vitamin B1  Take 1 tablet (100 mg total) by mouth daily.            CONTINUE taking these medications      apixaban 5 MG Tabs  Commonly known as: Eliquis     dilTIAZem 90 MG Tabs  Commonly known as: cardIZEM     ergocalciferol 1.25 MG (56341 UT) Caps  Commonly known as: ERGOCALCIFEROL     metFORMIN 500 MG Tabs  Commonly known as: Glucophage     metoprolol succinate ER 50 MG Tb24  Commonly known  as: Toprol XL            STOP taking these medications      diazePAM 2 MG Tabs  Commonly known as: Valium     diazePAM 5 MG Tabs  Commonly known as: Valium     lisinopril 30 MG Tabs     lisinopril 40 MG Tabs  Commonly known as: Prinivil; Zestril               Where to Get Your Medications        These medications were sent to [id number] - [place], [place] - [address] AT [address] [facility], [phone], [phone]  [address], [facility] [address]      Phone: [phone]   escitalopram 5 MG Tabs  folic acid 1 MG Tabs  OLANZapine 2.5 MG Tabs  pantoprazole 40 MG Tbec  thiamine 100 MG Tabs       You can get these medications from any pharmacy    Bring a paper prescription for each of these medications  clonazePAM 0.5 MG Tabs         FU   Follow-up Information       Wytat Ross, DO Follow up in 1 week(s).    Specialties: Family Medicine, Internal Medicine  Contact information:  [address]  [address]  [facility] [id number] [phone]                             DC instructions:      Other Discharge Instructions:         The patient would benefit from neuropsych testing a month from today considering improvement in his physical and cognitive function and returning to his baseline.     Community Palliative Care:  [facility]  [address], [place] [address]  Phone: [phone]  Fax: [phone]    Keep all follow up appointments and continue current medications.     Patient had opportunity to ask questions and state understand and agree with therapeutic plan as outlined    Thank You,    Edward Pa M.D.  [facility] Hospitalist

## 2025-06-06 PROBLEM — I21.3 ST ELEVATION MYOCARDIAL INFARCTION (STEMI), UNSPECIFIED ARTERY (HCC): Status: ACTIVE | Noted: 2025-01-01

## 2025-06-06 PROBLEM — I21.3 STEMI (ST ELEVATION MYOCARDIAL INFARCTION) (HCC): Status: ACTIVE | Noted: 2025-01-01

## 2025-06-06 NOTE — PROGRESS NOTES
Patient is a 79 year old   male with history of HTN, DM2, A-fib, CVA and history of dementia who presented to the hospital from [facility] for recent fall and found with low hemoglobin of 6.4.  Patient admitted to the PCU.  Patient has been demonstrating confusion and agitation.  Patient started on CIWA protocol and psych consult requested for evaluation and advice.  Consult Duration     The patient seen for over 50-minute, follow-up evaluation, over 50% counseling and coordinating care addressing confusion, agitation.    Record reviewed, communication with attending, communication with RN and patient seen face to face evaluation.    History of Present Illness:     The patient was seen last 2 days ago and the patient medication has been alternated to Lexapro 5 mg nightly and Klonopin 0.5 mg nightly with olanzapine 2.5 mg nightly.    The patient is seen today in his room and the patient presented calm and cooperative.  Patient calm and slightly tired.  Otherwise patient reporting that he has been doing okay and sleeping better but he continued having low mood with little anxiousness and not having much of appetite.    The patient otherwise deny any hallucination, delusional ideation and denying any homicidal or suicidal ideation.  Patient denied any side effect to the medication.    Patient reporting that his memory has been coming and going.  Otherwise he feels he is anxious being in the hospital and he would like to go home.  He  The patient who has been demonstrating delirious episode has been demonstrating reasonable improvement gradually.      Past Psychiatric/Medication History:  1. Prior diagnoses: Generalized anxiety disorder.  2. Past psychiatric inpatient: No psych admission reported  3. Past outpatient history: PCP  4. Past suicide history: Denied any  5. Medication history: Diazepam    Social History:   Patient is a  male lives in [facility] home.  Patient's  POA is Clem Naidu.  Patient with history of CVA.  Questionable diagnosis of dementia.  Patient denying history of substance abuse otherwise admitted to drinking socially with recent excessive drinking.    Family History:  Patient denies psychiatric family history  Medical History:   Past Medical History  Past Medical History[1]    Past Surgical History  Past Surgical History[2]    Family History  Family History[3]    Social History  Short Social Hx on File[4]        Current Medications:  Current Hospital Medications[5]  Prescriptions Prior to Admission[6]    Allergies  Allergies[7]    Review of Systems:   As by Admitting/Attending    Results:   Laboratory Data:  Lab Results   Component Value Date    WBC 11.3 (H) 06/04/2025    HGB 7.2 (L) 06/05/2025    HCT 24.1 (L) 06/05/2025    .0 (H) 06/04/2025    .0 (H) 06/04/2025    CREATSERUM 1.34 (H) 06/05/2025    BUN 18 06/05/2025     06/05/2025    K 3.5 06/05/2025    K 3.5 06/05/2025     06/05/2025    CO2 22.0 06/05/2025     (H) 06/05/2025    CA 8.0 (L) 06/05/2025    ALB 2.9 (L) 06/04/2025    ALKPHO 64 05/30/2025    TP 6.4 05/30/2025    AST 39 (H) 05/30/2025    ALT 46 05/30/2025    PTT 56.6 (H) 06/05/2025    INR 1.47 (H) 05/23/2025    PTP 18.6 (H) 05/23/2025    T4F 0.8 05/29/2025    TSH 1.122 05/29/2025    MG 1.9 06/05/2025    PHOS 2.8 06/04/2025    B12 477 05/29/2025         Imaging:  No results found.      Vital Signs:   Blood pressure (!) 117/95, pulse 76, temperature 97.7 °F (36.5 °C), temperature source Oral, resp. rate 18, height 69\", weight 66.9 kg (147 lb 7.8 oz), SpO2 97%.    Mental Status Exam:   Appearance: Stated age male, in hospital gown, sitting down in hospital bed.  Psychomotor: No psychomotor agitation or retardation but slight restlessness.  Orientation: Alert and oriented to person, place and date.  Patient is noticeably less confused.  Gait: Not evaluated.  Attitude/Coorperation: Patient presented more attentive  otherwise slightly defensive.  Behavior: Cooperative  Speech: Regular rate and rhythm speech.  No dysarthria  Mood: Patient admitted that he has been feeling anxious.  Affect: Anxious affect congruent with mood.  Thought process: Improvement in thought process.  Thought content: No reports of  suicidal or homicidal ideation.  Perceptions: Patient denying any auditory or visual hallucination.    Concentration: Grossly distracted  Memory: Grossly impaired with some improvement  Intellect: Average.  Judgment and Insight: Questionable.     Impression:     Episodic mood disorder.  Delirium due to another medical condition.  Generalized anxiety disorder.  Benzodiazepine withdrawal syndrome.  Acute gastrointestinal hemorrhage  Tremor  Mixed Alzheimer and vascular dementia (HCC)      The patient is a 79-year-old   male with history of A-fib, HTN, DM2 and history of CVA who presented to the hospital with fall and bloody bowel movement.  Patient found with hemoglobin of 6.4.  Patient during this admission started to demonstrate alternation in his mood, episode of confusion, restlessness, agitation hallucinating.  Patient started on CIWA after he mentioning wine.    The patient seen today in his room and the patient demonstrating improvement in his cognitive function considering his condition previously.  Patient admitting history of anxiety.  Patient admitting some sign of depression.  Patient endorsing the fact that he have history of social drinking with recent excessive drinking on memorial day and recent fall with abdominal pain.    The patient has been demonstrating delirium episode superimposed on anxiety, depression and alcohol abuse with some improvement.    5/31/2025: patient continues to demonstrate alternation in mood and cognition. Patient aaox3 this morning according to RN. Patient otherwise anxious and restless and refusing to answer questions stating he needs water. Patient NPO.     6/2/2025: The  patient continued demonstrating alternation in his mood, cognition, restlessness, tremulousness, tachycardic with slight fluctuation in the blood pressure.    6/3/2025: The patient has demonstrating significant improvement in his mood and demeanor otherwise continue to be somewhat defensive and some odd behavior at time but no withdrawal symptom.    6/5/2025: The patient has been demonstrating slight improvement in his mood and demeanor and appropriate to be discharged.    Discussed risk and benefit, acknowledging the current symptom and severity.  At this point, I would recommend the following approach:     Focus on safety  Focus on education and support.  Focus on insight orientation helping the patient understand diagnosis and treatment plan.  Continue Klonopin 0.5 mg nightly.  Continue Zyprexa 2.5 mg nightly.  Continue Lexapro 5 mg nightly.  Coordinate plan with team    Orders This Visit:  Orders Placed This Encounter   Procedures    CBC With Differential With Platelet    Comp Metabolic Panel (14)    Hemoglobin    Prothrombin Time (PT)    PTT, Activated    Comp Metabolic Panel (14)    CBC With Differential With Platelet    Prothrombin Time (PT)    Hemoglobin & Hematocrit    Basic Metabolic Panel (8)    Comp Metabolic Panel (14)    Arterial blood gas    Hemoglobin    CBC With Differential With Platelet    Comp Metabolic Panel (14)    Expanded Arterial Blood Gas    CBC With Differential With Platelet    Triglycerides    Hemoglobin A1C    CBC With Differential With Platelet    Basic Metabolic Panel (8)    CBC With Differential With Platelet    Basic Metabolic Panel (8)    Basic Metabolic Panel (8)    TSH and Free T4    Vitamin B12    Magnesium    Potassium    Magnesium    Hemoglobin & Hematocrit    Basic Metabolic Panel (8)    CBC With Differential With Platelet    Arterial blood gas    Potassium    CBC, Platelet; No Differential    Urinalysis with Culture Reflex    Magnesium    Phosphorus    Creatinine, Serum     Potassium    PTT, Activated    PTT, Activated    PTT, Activated    PTT, Activated    Basic Metabolic Panel (8)    PTT, Activated    PTT, Activated    PTT, Activated    Basic Metabolic Panel (8)    Renal Function Panel    Magnesium    Creatinine, Urine, Random    Sodium, Urine, Random    Urinalysis, Routine    Basic Metabolic Panel (8)    PTT, Activated    PTT, Activated    Magnesium    Potassium    Sodium, Serum    Hemoglobin & Hematocrit    Type and screen    ABORH (Blood Type)    Antibody Screen    ABORH Confirmation    Prepare RBC Once    Prepare RBC Once    Prepare RBC STAT    Specimen to Pathology Tissue    MRSA Screen by PCR    Blood Culture    Urine Culture, Routine       Meds This Visit:  Requested Prescriptions     Signed Prescriptions Disp Refills    escitalopram 5 MG Oral Tab 30 tablet 0     Sig: Take 1 tablet (5 mg total) by mouth nightly.    OLANZapine 2.5 MG Oral Tab 30 tablet 0     Sig: Take 1 tablet (2.5 mg total) by mouth nightly.    thiamine 100 MG Oral Tab 30 tablet 0     Sig: Take 1 tablet (100 mg total) by mouth daily.    clonazePAM 0.5 MG Oral Tab 30 tablet 0     Sig: Take 1 tablet (0.5 mg total) by mouth nightly.    pantoprazole 40 MG Oral Tab EC 30 tablet 0     Sig: Take 1 tablet (40 mg total) by mouth daily.    folic acid 1 MG Oral Tab 30 tablet 0     Sig: Take 1 tablet (1 mg total) by mouth daily.     Nicola Stone MD  6/5/2025    Note to Patient: The 21st Century Cures Act makes medical notes like these available to patients in the interest of transparency. However, be advised this is a medical document. It is intended as peer to peer communication. It is written in medical language and may contain abbreviations or verbiage that are unfamiliar. It may appear blunt or direct. Medical documents are intended to carry relevant information, facts as evident, and the clinical opinion of the practitioner. This note may have been transcribed using a voice dictation system. Voice recognition  errors may occur. This should not be taken to alter the content or meaning of this note.            [1]   Past Medical History:   A-fib (HCC)    CVA (cerebral vascular accident) (HCC)    Diabetes (HCC)    Diverticulosis of large intestine    Scattered pandiverticulosis   [2]   Past Surgical History:  Procedure Laterality Date    Colonoscopy N/A 05/22/2025    EGD/COLONOSCOPY;  Surgeon: Cesar Byrne MD;  Location: [facility] ENDOSCOPY   [3] No family history on file.  [4]   Social History  Socioeconomic History    Marital status:    Tobacco Use    Smoking status: Former     Types: Cigarettes     Passive exposure: Never    Smokeless tobacco: Never   Vaping Use    Vaping status: Never Used   Substance and Sexual Activity    Alcohol use: Yes     Alcohol/week: 6.0 - 7.0 standard drinks of alcohol     Types: 3 - 4 Standard drinks or equivalent, 3 Cans of beer per week     Social Drivers of Health     Food Insecurity: Patient Unable To Answer (5/28/2025)    NCSS - Food Insecurity     Worried About Running Out of Food in the Last Year: Patient unable to answer     Ran Out of Food in the Last Year: Patient unable to answer   Transportation Needs: Patient Unable To Answer (5/28/2025)    NCSS - Transportation     Lack of Transportation: Patient unable to answer   Housing Stability: Patient Unable To Answer (5/28/2025)    NCSS - Housing/Utilities     Has Housing: Patient unable to answer     Worried About Losing Housing: Patient unable to answer     Unable to Get Utilities: Patient unable to answer   [5]   No current facility-administered medications for this encounter.   [6]   No medications prior to admission.   [7]   Allergies  Allergen Reactions    Lisinopril ANGIOEDEMA     Per Dr. Fonseca, continue to hold Lisinopril as Pt is an unreliable historian & Pt experiences cardiopulmonary arrest in which angioedema from Lisinopril is suspected;

## 2025-06-07 NOTE — PLAN OF CARE
Patient came from emergency room with cardiogenic shock, elevated troponin and hypotensive.  Central line in place, maxed on levo gtt and vaso gtt on bipap 50% fio2.  At 0312am patient became shaylee I gave atropine and epi then stopped breathing and no pulse   I verified with Gardenia Olsen and time of death 0312am   I updated Dr Lovelace and all consults Dr Healy and Dr Fonseca  I called [facility] public Guardian Juanito made aware.  I called brother Gil no answer (left message to call me back)  Followed  death tracking form   [facility] called not a candidate # [id number] [place]   called #[phone] juan alberto (body released)  & security/crn

## 2025-06-07 NOTE — ED INITIAL ASSESSMENT (HPI)
Patient presents to ED via EMS with concerns for sepsis. Sent from [facility]. + nuasea and vomiting began at 1500. Hypotensive with EMS in the 80s.  with EMS. A&O x 1 at baseline. Afebrile with EMS/Nursing home. ST elevations noted on Monitor in ED. MD at bedside.

## 2025-06-07 NOTE — ED QUICK NOTES
RT ne route to bedside to start patient on Bipap @ this time.    Patient made DNR/DNI by care associate - all meds to be used per MD.    MD @ bedside starting central line @ this time.

## 2025-06-07 NOTE — ED PROVIDER NOTES
Patient Seen in: [facility] Emergency Department    History     Chief Complaint   Patient presents with    Fever     Stated Complaint: Sepsis    HPI    Patient brought by EMS for altered mental status noted to be hypoxic and hypotensive.  On arrival systolic blood pressures in the 70s.  Patient has history of dementia currently he is unresponsive some withdrawal to painful stimuli..  Patient was recently discharged from this hospital.  He is listed as full code and has a public guardian listed as next of kin.  History obtained from EMS patient unable to provide any history    Past Medical History[1]    Past Surgical History[2]         Family History[3]    Short Social Hx on File[4]    Review of Systems    Positive for stated complaint: Sepsis  Other systems are as noted in HPI.  Constitutional and vital signs reviewed.      All other systems reviewed and negative except as noted above.    PSFH elements reviewed from today and agreed except as otherwise stated in HPI.    Physical Exam     ED Triage Vitals [06/06/25 2057]   BP (!) 73/27   Pulse 111   Resp (!) 28   Temp    Temp src    SpO2 93 %   O2 Device None (Room air)       Current:BP (!) 72/62   Pulse 111   Resp (!) 27   Wt 70.6 kg   SpO2 97%   BMI 22.98 kg/m²    PULSE OX hypoxic 84% room air  GENERAL: Unresponsive, respiratory distress noted pale  HEAD: normocephalic, atraumatic,   EYES: PERRLA, EOMI, conj sclera clear  THROAT: mmm, no lesions  NECK: supple, no meningeal signs  LUNGS: Tachypneic with coarse rhonchi bilateral  CARDIO: Tachycardic  GI: abdomen is soft and non tender, no masses, nl bowel sounds   EXTREMITIES: Cool extremities  NEURO: Unresponsive  SKIN: Pale, cool      Differential includes: Cardiogenic shock versus septic shock    ED Course     Labs Reviewed   CBC WITH DIFFERENTIAL WITH PLATELET - Abnormal; Notable for the following components:       Result Value    WBC 13.4 (*)     RBC 3.57 (*)     HGB 8.9 (*)     HCT 31.7 (*)      MCH 24.9 (*)     MCHC 28.1 (*)     RDW-SD 58.1 (*)     RDW 17.7 (*)     .0 (*)     Neutrophil Absolute Prelim 11.83 (*)     Neutrophil Absolute 11.83 (*)     Lymphocyte Absolute 0.74 (*)     All other components within normal limits   COMP METABOLIC PANEL (14)   LACTIC ACID, PLASMA   TROPONIN I HIGH SENSITIVITY   PTT, ACTIVATED   RAINBOW DRAW LAVENDER   RAINBOW DRAW LIGHT GREEN   RAINBOW DRAW BLUE   RAINBOW DRAW GOLD   SARS-COV-2/FLU A AND B/RSV BY PCR (GENEXPERT)   BLOOD CULTURE   BLOOD CULTURE     EKG    Rate, intervals and axes as noted on EKG Report.  Rate: 100  Rhythm: Atrial Fibrillation  Reading: A-fib with ST elevation MI V2 through V5.           [facility]       Cardiac Monitor:   Pulse Readings from Last 1 Encounters:   06/06/25 111   , sinus, 110  interpreted by me.    Radiology findings:   No results found.    PROCEDURE:Central line placement:   maximal sterile barrier technique was uses including cap, gown, sterile gloves, large sheet, handwashing and chlorexidine prep.  The area anesthetized with 1% lidocaine.  The r int jugular was identified with us vein was punctured then a wire introducer was placed , a triple lumen catheter was placed using Seldinger technique.  No complications.  Blood return low pressure, dark blood.  Patient tolerated procedure well.   Line placement verified on cxr.    The procedure was performed by myself.      Medical Decision Making  EKG consistent with ST elevation MI.  Patient unable to consent to treatment.  Spoke with patient's public guardian at this time we will change his CODE STATUS to DNAR select.  No intubation.  No cardiac cath.  Spoke to cardiology will heparinize medical management.  Due to hypotension central line placed started on pressors.  Respiratory distress noted.  BiPAP started.  Order for hospice placed as likely will need to transition to hospice.    Problems Addressed:  ST elevation myocardial infarction (STEMI), unspecified artery (HCC): acute  illness or injury with systemic symptoms that poses a threat to life or bodily functions    Amount and/or Complexity of Data Reviewed  Labs: ordered. Decision-making details documented in ED Course.  Radiology: ordered and independent interpretation performed. Decision-making details documented in ED Course.     Details: Cxr-central line in place  ECG/medicine tests: ordered and independent interpretation performed. Decision-making details documented in ED Course.  Discussion of management or test interpretation with external provider(s): I spent a total of 60 minutes of critical care time in obtaining history, performing a physical exam, bedside monitoring of interventions, collecting and interpreting tests and discussion with consultants but not including time spent performing procedures.      Risk  Drug therapy requiring intensive monitoring for toxicity.  Decision regarding hospitalization.        [facility] of [facility]      Shock  Reassessment Note    /63   Pulse 86   Temp 98.6 °F (37 °C) (Rectal)   Resp (!) 28   Wt 70.6 kg   SpO2 97%   BMI 22.98 kg/m²      I completed the shock reassessment at 10:30    Cardiac:  Regularity: Irregular  Rate: Tachycardic  Heart Sounds: S1,S2    Lungs:   Right: Rhonchi  Left: Rhonchi    Peripheral Pulses:  Radial: Right 1+ or Left 1+      Capillary Refill:  >3 Secs    Skin:  Temp/Moisture: Clammy   Color: Pale      Guerrero Lovelace MD  6/6/2025  10:34 PM        Disposition and Plan     Clinical Impression:  1. ST elevation myocardial infarction (STEMI), unspecified artery (HCC)        Disposition:  Admit    Follow-up:  No follow-up provider specified.    Medications Prescribed:  Current Discharge Medication List          Hospital Problems       Present on Admission  Date Reviewed: 11/27/2020          ICD-10-CM Noted POA    STEMI (ST elevation myocardial infarction) (HCC) I21.3 6/6/2025 Unknown                     [1]   Past Medical History:    A-fib (HCC)    CVA (cerebral vascular accident) (HCC)    Diabetes (HCC)    Diverticulosis of large intestine    Scattered pandiverticulosis   [2]   Past Surgical History:  Procedure Laterality Date    Colonoscopy N/A 05/22/2025    EGD/COLONOSCOPY;  Surgeon: Cesar Byrne MD;  Location: [facility] ENDOSCOPY   [3] No family history on file.  [4]   Social History  Socioeconomic History    Marital status:    Tobacco Use    Smoking status: Former     Types: Cigarettes     Passive exposure: Never    Smokeless tobacco: Never   Vaping Use    Vaping status: Never Used   Substance and Sexual Activity    Alcohol use: Yes     Alcohol/week: 6.0 - 7.0 standard drinks of alcohol     Types: 3 - 4 Standard drinks or equivalent, 3 Cans of beer per week     Social Drivers of Health     Food Insecurity: Patient Unable To Answer (5/28/2025)    NCSS - Food Insecurity     Worried About Running Out of Food in the Last Year: Patient unable to answer     Ran Out of Food in the Last Year: Patient unable to answer   Transportation Needs: Patient Unable To Answer (5/28/2025)    NCSS - Transportation     Lack of Transportation: Patient unable to answer   Housing Stability: Patient Unable To Answer (5/28/2025)    NCSS - Housing/Utilities     Has Housing: Patient unable to answer     Worried About Losing Housing: Patient unable to answer     Unable to Get Utilities: Patient unable to answer

## 2025-06-07 NOTE — ED QUICK NOTES
Patient has a public guardian - Clem Naidu - that is in charge of his affairs.     Guerrero Valdez :[phone] - called asking for updates.

## 2025-06-09 LAB
C ALBICANS DNA BLD POS QL NAA+NON-PROBE: DETECTED
Q-T INTERVAL: 390 MS
QRS DURATION: 152 MS
QTC CALCULATION (BEZET): 503 MS
R AXIS: 33 DEGREES
T AXIS: 75 DEGREES
VENTRICULAR RATE: 100 BPM

## 2025-06-12 LAB
BACTERIA BLD CULT: POSITIVE
BACTERIA BLD CULT: POSITIVE

## 2025-06-13 ENCOUNTER — TELEPHONE (OUTPATIENT)
Dept: PULMONOLOGY | Facility: CLINIC | Age: 80
End: 2025-06-13

## 2025-06-13 NOTE — TELEPHONE ENCOUNTER
Received death certificate from [employer].     Called  home who informed that since Dr Fonseca is not in office until Monday, signed form can be faxed to them Monday. Requested to be faxed to the state and office (fax # [phone]). Updated  home on correct time of death, 3:12 am

## 2025-06-16 NOTE — TELEPHONE ENCOUNTER
[employer] calling to inform that fax needs to be sent by today since their is a 24 hour turnaround time,thanks.

## 2025-06-16 NOTE — TELEPHONE ENCOUNTER
[place] fax[place] to the state at #[phone] and Yulia at [facility] at #[phone]. Confirmations received. [facility] aware of the previous.

## 2025-06-17 NOTE — TELEPHONE ENCOUNTER
Confirmed with Yulia at [facility] that they received death certificate. She states they received it through the [facility]. Yulia is aware that death certificate was sent to the [facility] & [facility] at #[phone] and RN left message with Marvin yesterday. Nothing further needed from pulmonary office at this time.

## (undated) DEVICE — KIT ENDO ORCAPOD 160/180/190

## (undated) DEVICE — MEDI-VAC NON-CONDUCTIVE SUCTION TUBING 6MM X 1.8M (6FT.) L: Brand: CARDINAL HEALTH

## (undated) DEVICE — LASSO POLYPECTOMY SNARE: Brand: LASSO

## (undated) DEVICE — 60 ML SYRINGE REGULAR TIP: Brand: MONOJECT

## (undated) DEVICE — KIT CLEAN ENDOKIT 1.1OZ GOWNX2

## (undated) DEVICE — MEDI-VAC NON-CONDUCTIVE SUCTION TUBING: Brand: CARDINAL HEALTH

## (undated) DEVICE — CONMED SCOPE SAVER BITE BLOCK, 20X27 MM: Brand: SCOPE SAVER

## (undated) DEVICE — Device

## (undated) DEVICE — PATIENT RETURN ELECTRODE, SINGLE-USE, CONTACT QUALITY MONITORING, ADULT, WITH 9FT CORD, FOR PATIENTS WEIGING OVER 33LBS. (15KG): Brand: MEGADYNE

## (undated) DEVICE — V2 SPECIMEN COLLECTION MANIFOLD KIT: Brand: NEPTUNE

## (undated) DEVICE — YANKAUER,BULB TIP,W/O VENT,RIGID,STERILE: Brand: MEDLINE

## (undated) NOTE — LETTER
[facility]  [address], [place], [place]    Authorization for Surgical Operation and Procedure                               I hereby authorize Cesar Byrne MD, my physician and his/her assistants (if applicable), which may include medical students, residents, and/or fellows, to perform the following surgical operation/ procedure and administer such anesthesia as may be determined necessary by my physician: Operation/Procedure name (s) COLONOSCOPY on Aldo Valdez   2.   I recognize that during the surgical operation/procedure, unforeseen conditions may necessitate additional or different procedures than those listed above.  I, therefore, further authorize and request that the above-named surgeon, assistants, or designees perform such procedures as are, in their judgment, necessary and desirable.    3.   My surgeon/physician has discussed prior to my surgery the potential benefits, risks and side effects of this procedure; the likelihood of achieving goals; and potential problems that might occur during recuperation.  They also discussed reasonable alternatives to the procedure, including risks, benefits, and side effects related to the alternatives and risks related to not receiving this procedure.  I have had all my questions answered and I acknowledge that no guarantee has been made as to the result that may be obtained.    4.   Should the need arise during my operation/procedure, which includes change of level of care prior to discharge, I also consent to the administration of blood and/or blood products.  Further, I understand that despite careful testing and screening of blood or blood products by collecting agencies, I may still be subject to ill effects as a result of receiving a blood transfusion and/or blood products.  The following are some, but not all, of the potential risks that can occur: fever and allergic reactions, hemolytic reactions, transmission of diseases such as  Hepatitis, AIDS and Cytomegalovirus (CMV) and fluid overload.  In the event that I wish to have an autologous transfusion of my own blood, or a directed donor transfusion, I will discuss this with my physician.  Check only if Refusing Blood or Blood Products  I understand refusal of blood or blood products as deemed necessary by my physician may have serious consequences to my condition to include possible death. I hereby assume responsibility for my refusal and release the hospital, its personnel, and my physicians from any responsibility for the consequences of my refusal.    o  Refuse   5.   I authorize the use of any specimen, organs, tissues, body parts or foreign objects that may be removed from my body during the operation/procedure for diagnosis, research or teaching purposes and their subsequent disposal by hospital authorities.  I also authorize the release of specimen test results and/or written reports to my treating physician on the hospital medical staff or other referring or consulting physicians involved in my care, at the discretion of the Pathologist or my treating physician.    6.   I consent to the photographing or videotaping of the operations or procedures to be performed, including appropriate portions of my body for medical, scientific, or educational purposes, provided my identity is not revealed by the pictures or by descriptive texts accompanying them.  If the procedure has been photographed/videotaped, the surgeon will obtain the original picture, image, videotape or CD.  The hospital will not be responsible for storage, release or maintenance of the picture, image, tape or CD.    7.   I consent to the presence of a  or observers in the operating room as deemed necessary by my physician or their designees.    8.   I recognize that in the event my procedure results in extended X-Ray/fluoroscopy time, I may develop a skin reaction.    9. If I have a Do Not Attempt  Resuscitation (DNAR) order in place, that status will be suspended while in the operating room, procedural suite, and during the recovery period unless otherwise explicitly stated by me (or a person authorized to consent on my behalf). The surgeon or my attending physician will determine when the applicable recovery period ends for purposes of reinstating the DNAR order.  10. Patients having a sterilization procedure: I understand that if the procedure is successful the results will be permanent and it will therefore be impossible for me to inseminate, conceive, or bear children.  I also understand that the procedure is intended to result in sterility, although the result has not been guaranteed.   11. I acknowledge that my physician has explained sedation/analgesia administration to me including the risk and benefits I consent to the administration of sedation/analgesia as may be necessary or desirable in the judgment of my physician.    I CERTIFY THAT I HAVE READ AND FULLY UNDERSTAND THE ABOVE CONSENT TO OPERATION and/or OTHER PROCEDURE.     ____________________________________  _________________________________        ______________________________  Signature of Patient    Signature of Responsible Person                Printed Name of Responsible Person                                      ____________________________________  _____________________________                ________________________________  Signature of Witness        Date  Time         Relationship to Patient    STATEMENT OF PHYSICIAN My signature below affirms that prior to the time of the procedure; I have explained to the patient and/or his/her legal representative, the risks and benefits involved in the proposed treatment and any reasonable alternative to the proposed treatment. I have also explained the risks and benefits involved in refusal of the proposed treatment and alternatives to the proposed treatment and have answered the patient's  questions. If I have a significant financial interest in a co-management agreement or a significant financial interest in any product or implant, or other significant relationship used in this procedure/surgery, I have disclosed this and had a discussion with my patient.     _____________________________________________________              _____________________________  (Signature of Physician)                                                                                         (Date)                                   (Time)  Patient Name: Aldo Valdez      : 1945      Printed: 2025     Medical Record #: [id number]                                      Page 1 of 1

## (undated) NOTE — LETTER
[facility]  [address], [place], [place]    Authorization for Surgical Operation and Procedure                               I hereby authorize Cesar Byrne MD, my physician and his/her assistants (if applicable), which may include medical students, residents, and/or fellows, to perform the following surgical operation/ procedure and administer such anesthesia as may be determined necessary by my physician: Operation/Procedure name (s) ESOPHAGOGASTRODUODENOSCOPY (EGD) on Aldo Valdez   2.   I recognize that during the surgical operation/procedure, unforeseen conditions may necessitate additional or different procedures than those listed above.  I, therefore, further authorize and request that the above-named surgeon, assistants, or designees perform such procedures as are, in their judgment, necessary and desirable.    3.   My surgeon/physician has discussed prior to my surgery the potential benefits, risks and side effects of this procedure; the likelihood of achieving goals; and potential problems that might occur during recuperation.  They also discussed reasonable alternatives to the procedure, including risks, benefits, and side effects related to the alternatives and risks related to not receiving this procedure.  I have had all my questions answered and I acknowledge that no guarantee has been made as to the result that may be obtained.    4.   Should the need arise during my operation/procedure, which includes change of level of care prior to discharge, I also consent to the administration of blood and/or blood products.  Further, I understand that despite careful testing and screening of blood or blood products by collecting agencies, I may still be subject to ill effects as a result of receiving a blood transfusion and/or blood products.  The following are some, but not all, of the potential risks that can occur: fever and allergic reactions, hemolytic reactions, transmission of  diseases such as Hepatitis, AIDS and Cytomegalovirus (CMV) and fluid overload.  In the event that I wish to have an autologous transfusion of my own blood, or a directed donor transfusion, I will discuss this with my physician.  Check only if Refusing Blood or Blood Products  I understand refusal of blood or blood products as deemed necessary by my physician may have serious consequences to my condition to include possible death. I hereby assume responsibility for my refusal and release the hospital, its personnel, and my physicians from any responsibility for the consequences of my refusal.    o  Refuse   5.   I authorize the use of any specimen, organs, tissues, body parts or foreign objects that may be removed from my body during the operation/procedure for diagnosis, research or teaching purposes and their subsequent disposal by hospital authorities.  I also authorize the release of specimen test results and/or written reports to my treating physician on the hospital medical staff or other referring or consulting physicians involved in my care, at the discretion of the Pathologist or my treating physician.    6.   I consent to the photographing or videotaping of the operations or procedures to be performed, including appropriate portions of my body for medical, scientific, or educational purposes, provided my identity is not revealed by the pictures or by descriptive texts accompanying them.  If the procedure has been photographed/videotaped, the surgeon will obtain the original picture, image, videotape or CD.  The hospital will not be responsible for storage, release or maintenance of the picture, image, tape or CD.    7.   I consent to the presence of a  or observers in the operating room as deemed necessary by my physician or their designees.    8.   I recognize that in the event my procedure results in extended X-Ray/fluoroscopy time, I may develop a skin reaction.    9. If I have a Do Not  Attempt Resuscitation (DNAR) order in place, that status will be suspended while in the operating room, procedural suite, and during the recovery period unless otherwise explicitly stated by me (or a person authorized to consent on my behalf). The surgeon or my attending physician will determine when the applicable recovery period ends for purposes of reinstating the DNAR order.  10. Patients having a sterilization procedure: I understand that if the procedure is successful the results will be permanent and it will therefore be impossible for me to inseminate, conceive, or bear children.  I also understand that the procedure is intended to result in sterility, although the result has not been guaranteed.   11. I acknowledge that my physician has explained sedation/analgesia administration to me including the risk and benefits I consent to the administration of sedation/analgesia as may be necessary or desirable in the judgment of my physician.    I CERTIFY THAT I HAVE READ AND FULLY UNDERSTAND THE ABOVE CONSENT TO OPERATION and/or OTHER PROCEDURE.     ____________________________________  _________________________________        ______________________________  Signature of Patient    Signature of Responsible Person                Printed Name of Responsible Person                                      ____________________________________  _____________________________                ________________________________  Signature of Witness        Date  Time         Relationship to Patient    STATEMENT OF PHYSICIAN My signature below affirms that prior to the time of the procedure; I have explained to the patient and/or his/her legal representative, the risks and benefits involved in the proposed treatment and any reasonable alternative to the proposed treatment. I have also explained the risks and benefits involved in refusal of the proposed treatment and alternatives to the proposed treatment and have answered the  patient's questions. If I have a significant financial interest in a co-management agreement or a significant financial interest in any product or implant, or other significant relationship used in this procedure/surgery, I have disclosed this and had a discussion with my patient.     _____________________________________________________              _____________________________  (Signature of Physician)                                                                                         (Date)                                   (Time)  Patient Name: Aldo Valdez      : 1945      Printed: 2025     Medical Record #: [id number]                                      Page 1 of 1

## (undated) NOTE — LETTER
[facility]  Administration of Anesthesia  I, Aldo Valdez agree to be cared for by a physician anesthesiologist alone and/or with a nurse anesthetist, who is specially trained to monitor me and give me medicine to put me to sleep or keep me comfortable during my procedure    I understand that my anesthesiologist and/or anesthetist is not an employee or agent of [facility] or [employer]. He or she works for [facility]    As the patient asking for anesthesia services, I agree to:  Allow the anesthesiologist (anesthesia doctor) to give me medicine and do additional procedures as necessary. Some examples are: Starting or using an “IV” to give me medicine, fluids or blood during my procedure, and having a breathing tube placed to help me breathe when I’m asleep (intubation). In the event that my heart stops working properly, I understand that my anesthesiologist will make every effort to sustain my life, unless otherwise directed by [facility] Do Not Resuscitate documents.  Tell my anesthesia doctor before my procedure:  If I am pregnant.  The last time that I ate or drank.  iii. All of the medicines I take (including prescriptions, herbal supplements, and pills I can buy without a prescription (including street drugs/illegal medications). Failure to inform my anesthesiologist about these medicines may increase my risk of anesthetic complications.  iv.If I am allergic to anything or have had a reaction to anesthesia before.  I understand how the anesthesia medicine will help me (benefits).  I understand that with any type of anesthesia medicine there are risks:  The most common risks are: nausea, vomiting, sore throat, muscle soreness, damage to my eyes, mouth, or teeth (from breathing tube placement).  Rare risks include: remembering what happened during my procedure, allergic reactions to medications, injury to my airway, heart, lungs, vision, nerves, or  muscles and in extremely rare instances death.  My doctor has explained to me other choices available to me for my care (alternatives).  Pregnant Patients (“epidural”):  I understand that the risks of having an epidural (medicine given into my back to help control pain during labor), include itching, low blood pressure, difficulty urinating, headache or slowing of the baby’s heart. Very rare risks include infection, bleeding, seizure, irregular heart rhythms and nerve injury.  Regional Anesthesia (“spinal”, “epidural”, & “nerve blocks”):  I understand that rare but potential complications include headache, bleeding, infection, seizure, irregular heart rhythms, and nerve injury.    _____________________________________________________________________________  Patient (or Representative) Signature/Relationship to Patient  Date   Time    _____________________________________________________________________________   Name (if used)    Language/Organization   Time    _____________________________________________________________________________  Nurse Anesthetist Signature     Date   Time  _____________________________________________________________________________  Anesthesiologist Signature     Date   Time  I have discussed the procedure and information above with the patient (or patient’s representative) and answered their questions. The patient or their representative has agreed to have anesthesia services.    _____________________________________________________________________________  Witness        Date   Time  I have verified that the signature is that of the patient or patient’s representative, and that it was signed before the procedure  Patient Name: Aldo Valdez     : 1945                 Printed: 2025 at 5:37 PM    Medical Record #: [id number]                                            Page 1 of 1  ----------ANESTHESIA CONSENT----------